# Patient Record
Sex: MALE | Race: WHITE
[De-identification: names, ages, dates, MRNs, and addresses within clinical notes are randomized per-mention and may not be internally consistent; named-entity substitution may affect disease eponyms.]

---

## 2017-11-20 ENCOUNTER — HOSPITAL ENCOUNTER (EMERGENCY)
Dept: HOSPITAL 74 - FER | Age: 52
Discharge: HOME | End: 2017-11-20
Payer: COMMERCIAL

## 2017-11-20 VITALS — HEART RATE: 70 BPM | DIASTOLIC BLOOD PRESSURE: 90 MMHG | SYSTOLIC BLOOD PRESSURE: 166 MMHG

## 2017-11-20 VITALS — TEMPERATURE: 98 F

## 2017-11-20 VITALS — BODY MASS INDEX: 30.6 KG/M2

## 2017-11-20 DIAGNOSIS — I10: ICD-10-CM

## 2017-11-20 DIAGNOSIS — Z79.82: ICD-10-CM

## 2017-11-20 DIAGNOSIS — J45.901: Primary | ICD-10-CM

## 2017-11-20 DIAGNOSIS — Z86.79: ICD-10-CM

## 2017-11-20 NOTE — PDOC
History of Present Illness





- General


History Source: Patient


Exam Limitations: No Limitations





- History of Present Illness


Initial Comments: 





11/20/17 16:01


The patient is a 52 year old male, with a significant past medical history of 

asthma(with prior hospitalizations and ICU stay without intubation) and 

hypertension, who presents to the emergency department with chest tightness for 

approximately 2 days. The patient reports waking up yesterday with chest 

tightness and shortness of breath. Patient reports using Symbicort daily 

Albuterol with no relief. Today, patient reports waking up with wheezing and a 

cough productive of white-yellow sputum. Patient reports using 6 pumps of his 

Albuterol today with relief of cough and wheezing. Patient reports tachycardia, 

which is typical after he uses his Albuterol. He denies any recent fever, chills

, headache, or dizziness. He denies any chest pain or diaphoresis. He denies 

any nausea or vomiting. 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 

recent travel. 





Allergies: NKDA


Past Surgical History: None reported.


Social History: Nonsmoker. No ETOH or recreational drug use. 








<Essence Valdez - Last Filed: 11/20/17 16:00>





- General


History Source: Patient


Exam Limitations: No Limitations





<Elvin Camilo - Last Filed: 11/20/17 16:59>





- General


Chief Complaint: Asthma


Stated Complaint: asthma


Time Seen by Provider: 11/20/17 15:45





Past History





<Essence Valdez - Last Filed: 11/20/17 16:00>





- Past Medical History


Asthma: Yes


Cardiac Disorders: Yes (TAA.  1 EPISODE ASTHMA RELATED A-FIB 5 YRS AGO)


COPD: No


HTN: Yes





- Suicide/Smoking/Psychosocial Hx


Smoking History: Never smoked


Have you smoked in the past 12 months: Yes


Number of Cigarettes Smoked Daily: 3


'Breaking Loose' booklet given: 10/13/14


Hx Alcohol Use: No


Drug/Substance Use Hx: No


Substance Use Type: None





<Elvin Camilo - Last Filed: 11/20/17 16:59>





- Past Medical History


Allergies/Adverse Reactions: 


 Allergies











Allergy/AdvReac Type Severity Reaction Status Date / Time


 


No Known Allergies Allergy   Verified 10/13/14 10:16











Home Medications: 


Ambulatory Orders





Albuterol Sulfate Inhaler - [Ventolin HFA Inhaler -] 1 - 2 inh PO QID PRN 10/13/

14 


Aspirin Coated [Ecotrin -] 325 mg PO Q48H 10/13/14 


Budesonide/Formeterol Fumarate [SYMBICORT 160/4.5mcg -] 1 inh PO DAILY 11/20/17 


Diltiazem Cd [Cardizem Cd -] 300 mg PO DAILY 11/20/17 


Prednisone [Deltasone] 60 mg PO DAILY #12 tablet 11/20/17 











**Review of Systems





- Review of Systems


Able to Perform ROS?: Yes


Comments:: 





11/20/17 16:01


GENERAL/CONSTITUTIONAL: No fever or chills. No weakness.


HEAD, EYES, EARS, NOSE AND THROAT: No change in vision. No ear pain or 

discharge. No sore throat.


CARDIOVASCULAR: Yes shortness of breath. No chest pain.


RESPIRATORY: Yes cough productive of white-yellow sputum, wheezing, and chest 

tightness, No hemoptysis.


GASTROINTESTINAL: No nausea, vomiting, diarrhea or constipation.


GENITOURINARY: No dysuria, frequency, or change in urination.


MUSCULOSKELETAL: No joint or muscle swelling or pain. No neck or back pain.


SKIN: No rash


NEUROLOGIC: No headache, vertigo, loss of consciousness, or change in strength/

sensation.


ENDOCRINE: No increased thirst. No abnormal weight change.


HEMATOLOGIC/LYMPHATIC: No anemia, easy bleeding, or history of blood clots.


ALLERGIC/IMMUNOLOGIC: No hives or skin allergy.








<Essence Valdez - Last Filed: 11/20/17 16:00>





*Physical Exam





- Vital Signs


 Last Vital Signs











Temp Pulse Resp BP Pulse Ox


 


 98 F   141 H  22   158/127   97 


 


 11/20/17 15:44  11/20/17 15:44  11/20/17 15:44  11/20/17 15:44  11/20/17 15:44














- Physical Exam


Comments: 





11/20/17 16:01





GENERAL: Awake, alert, and fully oriented, in no acute distress


HEAD: No signs of trauma


EYES: PERRLA, EOMI, sclera anicteric, conjunctiva clear


ENT: Auricles normal inspection, hearing grossly normal, nares patent, 

oropharynx clear without exudates. Moist mucosa


NECK: Normal ROM, supple, no lymphadenopathy, JVD, or masses


LUNGS: Breath sounds equal, clear to auscultation bilaterally.  No wheezes, and 

no crackles


HEART: Tachycardic. Regular rhythm, normal S1 and S2, no murmurs, rubs or 

gallops


ABDOMEN: Soft, nontender, normoactive bowel sounds.  No guarding, no rebound.  

No masses


EXTREMITIES: Normal range of motion, no edema.  No clubbing or cyanosis. No 

cords, erythema, or tenderness


NEUROLOGICAL: Cranial nerves II through XII grossly intact.  Normal speech, 

normal gait


SKIN: Warm, Dry, normal turgor, no rashes or lesions noted.








<Essence Valdez - Last Filed: 11/20/17 16:00>





- Vital Signs


 Last Vital Signs











Temp Pulse Resp BP Pulse Ox


 


 98 F   141 H  22   158/127   97 


 


 11/20/17 15:44  11/20/17 15:44  11/20/17 15:44  11/20/17 15:44  11/20/17 15:44














<Elvin Camilo - Last Filed: 11/20/17 16:59>





Medical Decision Making





- Medical Decision Making





11/20/17 15:54





A portion of this note was documented by scribe services under my direction. I 

have reviewed the details of the note, within reason, and agree with the 

documentation with the following case summary and management plan written by 

me. 





Patient treated in the ED.





Nursing notes are reviewed and incorporated into the medical decision-making.


Vital signs reviewed.





 Vital Signs











Temp Pulse Resp BP Pulse Ox


 


 98 F   141 H  22   158/127   97 


 


 11/20/17 15:44  11/20/17 15:44  11/20/17 15:44  11/20/17 15:44  11/20/17 15:44








52-year-old male with past mental history of hypertension, asthma with prior 

hospitalizations and ICU stays without intubations, currently on Symbicort, 

presents with asthma exacerbation. Patient reports that yesterday felt 

tightness and wheezing that worsened today. Reports clearish productive cough 

but denies fevers or chills. Patient had taken several pumps of his albuterol 

which states he always makes him tachycardic. Reports that his wheezing is 

cleared up but was concerned so came to the ED.





Patient lungs are clear now but with notable tachycardia likely secondary to 

albuterol. We'll give him prednisone and observe the patient. If the patient's 

symptoms are improved the heart rate improves, patient be discharged home with 

albuterol and prednisone.





Diagnosis: Asthma exacerbation


11/20/17 16:54





Patient has been observed. Breathing comfortably. HR 70.





I discussed the physical exam findings, ancillary test results and final 

diagnoses with the patient.  I answered all of the patient's questions.  The 

patient was satisfied with the care received and felt comfortable with the 

discharge plan and treatment plan.  The patient will call their primary care 

physician within 24 hours to arrange follow-up and will return to the Emergency 

Department with any new, persistant or worsening symptoms. 





<Elvin Camilo - Last Filed: 11/20/17 16:59>





*DC/Admit/Observation/Transfer





- Attestations


Scribe Attestion: 





11/20/17 16:01





Documentation prepared by Essence Valdez, acting as medical scribe for Elvin Camilo MD.





<Essence Valdez - Last Filed: 11/20/17 16:00>





- Discharge Dispostion


Admit: No





<Elvin Camilo - Last Filed: 11/20/17 16:59>


Diagnosis at time of Disposition: 


Asthma exacerbation attacks


Qualifiers:


 Asthma severity: unspecified severity Asthma persistence: unspecified 

Qualified Code(s): J45.901 - Unspecified asthma with (acute) exacerbation








- Discharge Dispostion


Disposition: HOME


Condition at time of disposition: Improved





- Prescriptions


Prescriptions: 


Prednisone [Deltasone] 60 mg PO DAILY #12 tablet





- Patient Instructions


Printed Discharge Instructions:  Asthma -- Adult


Additional Instructions: 


Take 2 puffs of albuterol every 4 hours as needed for wheezing.


Continue to take the prednisone daily.


Follow up with your doctor.

## 2018-10-04 ENCOUNTER — TRANSCRIPTION ENCOUNTER (OUTPATIENT)
Age: 53
End: 2018-10-04

## 2018-10-20 ENCOUNTER — HOSPITAL ENCOUNTER (INPATIENT)
Dept: HOSPITAL 74 - JER | Age: 53
LOS: 7 days | Discharge: HOME | DRG: 308 | End: 2018-10-27
Attending: INTERNAL MEDICINE | Admitting: INTERNAL MEDICINE
Payer: COMMERCIAL

## 2018-10-20 VITALS — BODY MASS INDEX: 29.4 KG/M2

## 2018-10-20 DIAGNOSIS — R00.0: ICD-10-CM

## 2018-10-20 DIAGNOSIS — I48.92: Primary | ICD-10-CM

## 2018-10-20 DIAGNOSIS — J18.1: ICD-10-CM

## 2018-10-20 DIAGNOSIS — E87.1: ICD-10-CM

## 2018-10-20 DIAGNOSIS — Z87.891: ICD-10-CM

## 2018-10-20 DIAGNOSIS — I10: ICD-10-CM

## 2018-10-20 DIAGNOSIS — G72.89: ICD-10-CM

## 2018-10-20 DIAGNOSIS — J45.901: ICD-10-CM

## 2018-10-20 DIAGNOSIS — I08.1: ICD-10-CM

## 2018-10-20 DIAGNOSIS — I48.0: ICD-10-CM

## 2018-10-20 DIAGNOSIS — I42.8: ICD-10-CM

## 2018-10-20 DIAGNOSIS — I70.0: ICD-10-CM

## 2018-10-20 DIAGNOSIS — E66.9: ICD-10-CM

## 2018-10-20 PROCEDURE — G0008 ADMIN INFLUENZA VIRUS VAC: HCPCS

## 2018-10-21 LAB
ALBUMIN SERPL-MCNC: 3.5 G/DL (ref 3.4–5)
ALBUMIN SERPL-MCNC: 4 G/DL (ref 3.4–5)
ALP SERPL-CCNC: 66 U/L (ref 45–117)
ALP SERPL-CCNC: 83 U/L (ref 45–117)
ALT SERPL-CCNC: 25 U/L (ref 13–61)
ALT SERPL-CCNC: 30 U/L (ref 13–61)
ANION GAP SERPL CALC-SCNC: 11 MMOL/L (ref 8–16)
ANION GAP SERPL CALC-SCNC: 12 MMOL/L (ref 8–16)
APPEARANCE UR: CLEAR
AST SERPL-CCNC: 23 U/L (ref 15–37)
AST SERPL-CCNC: 24 U/L (ref 15–37)
BACTERIA #/AREA URNS HPF: (no result) /HPF
BASOPHILS # BLD: 1.1 % (ref 0–2)
BILIRUB SERPL-MCNC: 1.1 MG/DL (ref 0.2–1)
BILIRUB SERPL-MCNC: 1.2 MG/DL (ref 0.2–1)
BILIRUB UR STRIP.AUTO-MCNC: NEGATIVE MG/DL
BNP SERPL-MCNC: 2442.2 PG/ML (ref 5–125)
BUN SERPL-MCNC: 10 MG/DL (ref 7–18)
BUN SERPL-MCNC: 14 MG/DL (ref 7–18)
CALCIUM SERPL-MCNC: 8.5 MG/DL (ref 8.5–10.1)
CALCIUM SERPL-MCNC: 9 MG/DL (ref 8.5–10.1)
CHLORIDE SERPL-SCNC: 100 MMOL/L (ref 98–107)
CHLORIDE SERPL-SCNC: 95 MMOL/L (ref 98–107)
CO2 SERPL-SCNC: 24 MMOL/L (ref 21–32)
CO2 SERPL-SCNC: 25 MMOL/L (ref 21–32)
COLOR UR: YELLOW
CREAT SERPL-MCNC: 0.7 MG/DL (ref 0.55–1.3)
CREAT SERPL-MCNC: 0.8 MG/DL (ref 0.55–1.3)
DEPRECATED RDW RBC AUTO: 12.9 % (ref 11.9–15.9)
DEPRECATED RDW RBC AUTO: 13.3 % (ref 11.9–15.9)
EOSINOPHIL # BLD: 0.1 % (ref 0–4.5)
GLUCOSE SERPL-MCNC: 113 MG/DL (ref 74–106)
GLUCOSE SERPL-MCNC: 136 MG/DL (ref 74–106)
HCT VFR BLD CALC: 43.8 % (ref 35.4–49)
HCT VFR BLD CALC: 47.6 % (ref 35.4–49)
HGB BLD-MCNC: 14.6 GM/DL (ref 11.7–16.9)
HGB BLD-MCNC: 15.7 GM/DL (ref 11.7–16.9)
KETONES UR QL STRIP: (no result)
LEUKOCYTE ESTERASE UR QL STRIP.AUTO: (no result)
LYMPHOCYTES # BLD: 7.8 % (ref 8–40)
MAGNESIUM SERPL-MCNC: 2.7 MG/DL (ref 1.8–2.4)
MCH RBC QN AUTO: 31 PG (ref 25.7–33.7)
MCH RBC QN AUTO: 31 PG (ref 25.7–33.7)
MCHC RBC AUTO-ENTMCNC: 33 G/DL (ref 32–35.9)
MCHC RBC AUTO-ENTMCNC: 33.3 G/DL (ref 32–35.9)
MCV RBC: 93 FL (ref 80–96)
MCV RBC: 93.9 FL (ref 80–96)
MONOCYTES # BLD AUTO: 14.7 % (ref 3.8–10.2)
NEUTROPHILS # BLD: 76.3 % (ref 42.8–82.8)
NITRITE UR QL STRIP: NEGATIVE
PH BLDV: 7.49 [PH] (ref 7.32–7.42)
PH UR: 6 [PH] (ref 5–8)
PHOSPHATE SERPL-MCNC: 4 MG/DL (ref 2.5–4.9)
PLATELET # BLD AUTO: 234 K/MM3 (ref 134–434)
PLATELET # BLD AUTO: 241 K/MM3 (ref 134–434)
PMV BLD: 7.7 FL (ref 7.5–11.1)
PMV BLD: 7.7 FL (ref 7.5–11.1)
POTASSIUM SERPLBLD-SCNC: 3.9 MMOL/L (ref 3.5–5.1)
POTASSIUM SERPLBLD-SCNC: 4.1 MMOL/L (ref 3.5–5.1)
PROT SERPL-MCNC: 6.9 G/DL (ref 6.4–8.2)
PROT SERPL-MCNC: 8.2 G/DL (ref 6.4–8.2)
PROT UR QL STRIP: (no result)
PROT UR QL STRIP: NEGATIVE
RBC # BLD AUTO: 4.71 M/MM3 (ref 4–5.6)
RBC # BLD AUTO: 5.07 M/MM3 (ref 4–5.6)
SODIUM SERPL-SCNC: 130 MMOL/L (ref 136–145)
SODIUM SERPL-SCNC: 136 MMOL/L (ref 136–145)
SP GR UR: 1.02 (ref 1.01–1.03)
UROBILINOGEN UR STRIP-MCNC: NEGATIVE MG/DL (ref 0.2–1)
VENOUS PC02: 34.4 MMHG (ref 38–52)
VENOUS PO2: 42.8 MMHG (ref 28–48)
WBC # BLD AUTO: 11 K/MM3 (ref 4–10)
WBC # BLD AUTO: 8 K/MM3 (ref 4–10)

## 2018-10-21 RX ADMIN — AZITHROMYCIN DIHYDRATE SCH MLS/HR: 500 INJECTION, POWDER, LYOPHILIZED, FOR SOLUTION INTRAVENOUS at 10:38

## 2018-10-21 RX ADMIN — APIXABAN SCH MG: 5 TABLET, FILM COATED ORAL at 15:57

## 2018-10-21 RX ADMIN — CEFTRIAXONE SCH MLS/HR: 1 INJECTION, POWDER, FOR SOLUTION INTRAMUSCULAR; INTRAVENOUS at 10:09

## 2018-10-21 RX ADMIN — HEPARIN SODIUM SCH UNIT: 5000 INJECTION, SOLUTION INTRAVENOUS; SUBCUTANEOUS at 10:09

## 2018-10-21 RX ADMIN — METOPROLOL TARTRATE SCH MG: 25 TABLET, FILM COATED ORAL at 14:15

## 2018-10-21 RX ADMIN — METOPROLOL TARTRATE SCH MG: 25 TABLET, FILM COATED ORAL at 22:07

## 2018-10-21 RX ADMIN — BUDESONIDE AND FORMOTEROL FUMARATE DIHYDRATE SCH: 160; 4.5 AEROSOL RESPIRATORY (INHALATION) at 10:09

## 2018-10-21 RX ADMIN — HEPARIN SODIUM SCH UNIT: 5000 INJECTION, SOLUTION INTRAVENOUS; SUBCUTANEOUS at 22:06

## 2018-10-21 RX ADMIN — LORATADINE SCH MG: 10 TABLET ORAL at 10:09

## 2018-10-21 RX ADMIN — APIXABAN SCH MG: 5 TABLET, FILM COATED ORAL at 22:07

## 2018-10-21 RX ADMIN — HEPARIN SODIUM SCH UNIT: 5000 INJECTION, SOLUTION INTRAVENOUS; SUBCUTANEOUS at 14:56

## 2018-10-21 NOTE — CON.CARD
Consult


Consult Specialty:: Cardiology


Referred by:: Hospitalist


Reason for Consultation:: Cardiac evaluation





- History of Present Illness


Chief Complaint: Shortness of breath and cough


History of Present Illness: 








Patient is 53 year old male with underlying history of PAF (not on 

anticoagulation) and bronchial asthma and history of bronchitis who presents 

with chills, shortness of breath and cough. He initially went to Urgent care 3 

weeks ago and was given Medrol pack. He returned again with persistent symptoms 

and was given Zithromax, but his symptoms did not improve. He came in yesterday 

to [place] ED and was found with acute left upper lobe infiltrate compatible 

with pneumonia on CT of chest. He was given IV antibiotics. Monitor also 

reveals atrial flutter with RVR. He denies chest pain at this time. SOB has 

improved. Denies palpitations. Denies paroxysmal nocturnal dyspnea or 

orthopnea. Denies fever. Denies nausea, vomiting, diarrhea or abdominal pain. 

Denies headache or lightheadedness.





- History Source


History Provided By: Patient, Medical Record


Limitations to Obtaining History: No Limitations





- Past Medical History


Cardio/Vascular: Yes: AFIB, HTN


Pulmonary: Yes: Asthma, Bronchitis





- Past Surgical History


Past Surgical History: Yes: None





- Alcohol/Substance Use


Hx Alcohol Use: Yes





- Smoking History


Smoking history: Former smoker


Have you smoked in the past 12 months: Yes


Aproximately how many cigarettes per day: 3





Home Medications





- Allergies


Allergies/Adverse Reactions: 


 Allergies











Allergy/AdvReac Type Severity Reaction Status Date / Time


 


No Known Allergies Allergy   Verified 10/13/14 10:16














- Home Medications


Home Medications: 


Ambulatory Orders





Albuterol Sulfate Inhaler - [Ventolin HFA Inhaler -] 1 - 2 inh PO QID PRN 10/13/

14 


Budesonide/Formeterol Fumarate [SYMBICORT 160/4.5mcg -] 1 inh PO DAILY 11/20/17 


Diltiazem Cd [Cardizem Cd -] 300 mg PO DAILY 11/20/17 


Loratadine [Claritin] 10 mg PO DAILY 10/21/18 


Valsartan 40 mg PO DAILY 10/21/18 











Family Disease History





- Family Disease History


Other Family History: History of AF





Review of Systems





- Review of Systems


Constitutional: reports: Chills.  denies: Fever


Cardiovascular: reports: Chest Pain, Palpitations, Shortness of Breath


Respiratory: reports: Cough, SOB, Wheezing.  denies: Hemoptysis, Orthopnea, PND

, SOB on Exertion


Gastrointestinal: denies: Abdominal Pain, Constipation, Diarrhea, Melena, Nausea

, Rectal Bleeding, Vomiting


Genitourinary: denies: Dysuria, Hematuria


Neurological: denies: Dizziness, Headache, Seizure, Syncope


Vital Signs: 


 Vital Signs











Temperature  97.6 F   10/21/18 13:00


 


Pulse Rate  125 H  10/21/18 13:00


 


Respiratory Rate  100 H  10/21/18 13:00


 


Blood Pressure  114/84   10/21/18 13:00


 


O2 Sat by Pulse Oximetry (%)  100   10/21/18 13:00











Eyes: Yes: PERRL


HENT: Yes: Atraumatic


Neck: Yes: Supple


Respiratory: Yes: Diminished


Gastrointestinal: Yes: Normal Bowel Sounds, Soft.  No: Tenderness


Cardiovascular: Yes: Tachycardia, Pulse Irregular


JVD: No


Carotid Bruit: No


PMI: Non-Displaced


Heart Sounds: Yes: S1, S2.  No: Gallop


Murmur: No: Systolic Murmur, Diastolic Murmur


Edema: No





- Other Data


Labs, Other Data: 


 CBC, BMP





 10/21/18 09:43 





 10/21/18 09:43 





 Troponin, BNP











  10/21/18 10/21/18





  00:09 09:43


 


Troponin I  < 0.02  < 0.02


 


B-Natriuretic Peptide  2442.2 H 








  Laboratory Results - last 24 hr











  10/20/18 10/21/18 10/21/18





  23:58 00:09 00:09


 


WBC   11.0 H 


 


RBC   4.71 


 


Hgb   14.6 


 


Hct   43.8 


 


MCV   93.0 


 


MCH   31.0 


 


MCHC   33.3 


 


RDW   12.9 


 


Plt Count   234 


 


MPV   7.7 


 


Absolute Neuts (auto)   8.4 H 


 


Neutrophils %   76.3 


 


Lymphocytes %   7.8 L 


 


Monocytes %   14.7 H 


 


Eosinophils %   0.1 


 


Basophils %   1.1 


 


Nucleated RBC %   0 


 


ESR   


 


VBG pH  7.49 H  


 


POC VBG pCO2  34.4 L  


 


POC VBG pO2  42.8  


 


Mixed VBG HCO3  25.6 H  


 


Sodium    130 L


 


Potassium    3.9


 


Chloride    95 L


 


Carbon Dioxide    25


 


Anion Gap    11


 


BUN    14


 


Creatinine    0.8


 


Creat Clearance w eGFR    > 60


 


Random Glucose    113 H


 


Serum Osmolality   


 


Calcium    8.5


 


Phosphorus   


 


Magnesium   


 


Total Bilirubin    1.1 H


 


AST    24


 


ALT    25


 


Alkaline Phosphatase    66


 


Troponin I    < 0.02


 


C-Reactive Protein   


 


B-Natriuretic Peptide    2442.2 H


 


Total Protein    6.9


 


Albumin    3.5


 


TSH    0.62


 


Urine Color   


 


Urine Appearance   


 


Urine pH   


 


Ur Specific Gravity   


 


Urine Protein   


 


Urine Glucose (UA)   


 


Urine Ketones   


 


Urine Blood   


 


Urine Nitrite   


 


Urine Bilirubin   


 


Urine Urobilinogen   


 


Ur Leukocyte Esterase   


 


Urine WBC (Auto)   


 


Urine RBC (Auto)   


 


Urine Bacteria   














  10/21/18 10/21/18 10/21/18





  00:11 02:35 09:43


 


WBC    8.0


 


RBC    5.07


 


Hgb    15.7


 


Hct    47.6


 


MCV    93.9


 


MCH    31.0


 


MCHC    33.0


 


RDW    13.3


 


Plt Count    241


 


MPV    7.7


 


Absolute Neuts (auto)   


 


Neutrophils %   


 


Lymphocytes %   


 


Monocytes %   


 


Eosinophils %   


 


Basophils %   


 


Nucleated RBC %   


 


ESR   


 


VBG pH   


 


POC VBG pCO2   


 


POC VBG pO2   


 


Mixed VBG HCO3   


 


Sodium   


 


Potassium   


 


Chloride   


 


Carbon Dioxide   


 


Anion Gap   


 


BUN   


 


Creatinine   


 


Creat Clearance w eGFR   


 


Random Glucose   


 


Serum Osmolality   


 


Calcium   


 


Phosphorus   


 


Magnesium   


 


Total Bilirubin   


 


AST   


 


ALT   


 


Alkaline Phosphatase   


 


Troponin I   


 


C-Reactive Protein   


 


B-Natriuretic Peptide   


 


Total Protein   


 


Albumin   


 


TSH   0.84  D 


 


Urine Color  Yellow  


 


Urine Appearance  Clear  


 


Urine pH  6.0  


 


Ur Specific Gravity  1.017  


 


Urine Protein  1+ H  


 


Urine Glucose (UA)  Negative  


 


Urine Ketones  1+ H  


 


Urine Blood  1+ H  


 


Urine Nitrite  Negative  


 


Urine Bilirubin  Negative  


 


Urine Urobilinogen  Negative  


 


Ur Leukocyte Esterase  Trace  


 


Urine WBC (Auto)  2  


 


Urine RBC (Auto)  3  


 


Urine Bacteria  Rare  














  10/21/18 10/21/18 10/21/18





  09:43 09:43 09:43


 


WBC   


 


RBC   


 


Hgb   


 


Hct   


 


MCV   


 


MCH   


 


MCHC   


 


RDW   


 


Plt Count   


 


MPV   


 


Absolute Neuts (auto)   


 


Neutrophils %   


 


Lymphocytes %   


 


Monocytes %   


 


Eosinophils %   


 


Basophils %   


 


Nucleated RBC %   


 


ESR    25 H


 


VBG pH   


 


POC VBG pCO2   


 


POC VBG pO2   


 


Mixed VBG HCO3   


 


Sodium  136  


 


Potassium  4.1  


 


Chloride  100  


 


Carbon Dioxide  24  


 


Anion Gap  12  


 


BUN  10  


 


Creatinine  0.7  


 


Creat Clearance w eGFR  > 60  


 


Random Glucose  136 H  


 


Serum Osmolality   


 


Calcium  9.0  


 


Phosphorus  4.0  


 


Magnesium  2.7 H  


 


Total Bilirubin  1.2 H  


 


AST  23  


 


ALT  30  


 


Alkaline Phosphatase  83  


 


Troponin I   < 0.02 


 


C-Reactive Protein  16.2 H  


 


B-Natriuretic Peptide   


 


Total Protein  8.2  


 


Albumin  4.0  


 


TSH   


 


Urine Color   


 


Urine Appearance   


 


Urine pH   


 


Ur Specific Gravity   


 


Urine Protein   


 


Urine Glucose (UA)   


 


Urine Ketones   


 


Urine Blood   


 


Urine Nitrite   


 


Urine Bilirubin   


 


Urine Urobilinogen   


 


Ur Leukocyte Esterase   


 


Urine WBC (Auto)   


 


Urine RBC (Auto)   


 


Urine Bacteria   




















Atrial flutter with RVR





Imaging





- Results


Chest X-ray: Report Reviewed (Left upper lobe pneumonia)


Cat Scan: Report Reviewed (CT of chest left upper lobe infiltrates c/w acute 

pneumonia)


EKG: Report Reviewed





Problem List





- Problems


(1) Bronchial asthma


Code(s): J45.909 - UNSPECIFIED ASTHMA, UNCOMPLICATED   


Qualifiers: 


   Asthma severity: unspecified severity   Asthma persistence: unspecified   

Asthma complication type: unspecified   Qualified Code(s): J45.909 - 

Unspecified asthma, uncomplicated   





(2) HTN (hypertension)


Code(s): I10 - ESSENTIAL (PRIMARY) HYPERTENSION   


Qualifiers: 


   Hypertension type: essential hypertension   Qualified Code(s): I10 - 

Essential (primary) hypertension   





(3) Atrial fibrillation


Code(s): I48.91 - UNSPECIFIED ATRIAL FIBRILLATION   


Qualifiers: 


   Atrial fibrillation type: paroxysmal   Qualified Code(s): I48.0 - Paroxysmal 

atrial fibrillation   





(4) Atrial flutter


Code(s): I48.92 - UNSPECIFIED ATRIAL FLUTTER   


Qualifiers: 


   Atrial flutter type: typical   Qualified Code(s): I48.3 - Typical atrial 

flutter   





(5) Pneumonia


Code(s): J18.9 - PNEUMONIA, UNSPECIFIED ORGANISM   


Qualifiers: 


   Pneumonia type: due to unspecified organism   Laterality: left   Lung 

location: upper lobe of lung   Qualified Code(s): J18.1 - Lobar pneumonia, 

unspecified organism   





Assessment/Plan





1. Clinical presentation c/w acute left upper lobe pneumonia


2. Atrial flutter with RVR with prior history of PAF not on anticoagulation 

[id number] score likely 1


3. HTN


4. Bronchial asthma with history of bronchitis





PLAN:


1. D/C Metoprolol and continue with Cardizem  mg QD. Give Cardizem 10 mg 

IVP


2. Add Eliquis 5 mg BID. If patient continues to be in atrial flutter, may 

consider ERNIE +/- DC synchronized cardioversion


3. Echocardiography to assess LV/RV and valvular function


4. Antibiotic coverage and pulmonary input to follow. Bronchodilators and O2 as 

needed


5. D/C Furosemide as he does not appear to be in heart failure





Further plans are to follow


Sam Ricardo MD

## 2018-10-21 NOTE — HP
CHIEF COMPLAINT: Shortness of breath





PCP:





HISTORY OF PRESENT ILLNESS:


Patient is a 53 year old male with history of asthma, Afib (no on 

anticoagulation), hypertension, presents with complaint of shortness of breath 

for the past three weeks. States that symptoms occurred with cough that has 

become productive with clear to yellow sputum. Denies subjective fevers, 

however admits chills, and night sweats. Endorses intermittent nausea, without 

vomiting. Admits sick contacts as he works as  in [facility]. 

Further admits new onset of two pillow orthopnea over the past three weeks. He 

went to urgent care center on two occasions, and received Medrol dose pack on 

first visit, and azithromycin on second visit which were not palliative. States 

his last cardiologist appointment was more than one year ago. 





ER course was notable for:


(1) EKG showed Aflutter 2:1 AV conduction.


(2) Vancomycin, Zosyn, Ofirmev, Duonebs, Prednisone 


(3)





Recent Travel:





PAST MEDICAL HISTORY: asthma, Afib (no on anticoagulation), hypertension





PAST SURGICAL HISTORY: 





Social History:


Smoking:Former smoker, Quit 3 years ago


Alcohol: Admits approx 6 beers, socially three times a week


Drugs: Denies





Family History:


Allergies





No Known Allergies Allergy (Verified 10/13/14 10:16)


 








HOME MEDICATIONS:


 Home Medications











 Medication  Instructions  Recorded


 


Albuterol Sulfate Inhaler - 1 - 2 inh PO QID PRN 10/13/14





[Ventolin HFA Inhaler -]  


 


Budesonide/Formeterol Fumarate 1 inh PO DAILY 11/20/17





[SYMBICORT 160/4.5mcg -]  


 


Diltiazem Cd [Cardizem Cd -] 300 mg PO DAILY 11/20/17


 


Loratadine [Claritin] 10 mg PO DAILY 10/21/18


 


Valsartan 40 mg PO DAILY 10/21/18








REVIEW OF SYSTEMS


As per HPI








PHYSICAL EXAMINATION


 Vital Signs - 24 hr











  10/20/18 10/21/18





  23:20 03:41


 


Temperature 100.3 F H 


 


Pulse Rate 135 H 


 


Respiratory 26 H 





Rate  


 


Blood Pressure 115/83 


 


O2 Sat by Pulse 100 96





Oximetry (%)  











GENERAL: Awake, alert, and fully oriented, in no acute distress.


HEAD: Normal with no signs of trauma.


EYES: Pupils equal, round and reactive to light, extraocular movements intact, 

sclera anicteric, conjunctiva clear. No lid lag.


EARS, NOSE, THROAT: Ears normal, nares patent, oropharynx clear without 

exudates. Moist mucous membranes.


NECK: Normal range of motion, supple without lymphadenopathy, JVD, or masses.


LUNGS: Breath sounds equal, clear to auscultation bilaterally. No wheezes, and 

no crackles. No accessory muscle use.


HEART: Regular rate and rhythm, normal S1 and S2 without murmur, rub or gallop.


ABDOMEN: Soft, nontender, not distended, normoactive bowel sounds, no guarding, 

no rebound, no masses.  No hepatomegaly or  splenomegaly. 


MUSCULOSKELETAL: Normal range of motion at all joints. No bony deformities or 

tenderness. No CVA tenderness.


UPPER EXTREMITIES: 2+ radial pulses b/l, warm, well-perfused. No cyanosis noted.


LOWER EXTREMITIES: 2+ dorsalis pulses b/l, warm, well-perfused. No calf 

tenderness b/l. 1+ lower extremity pitting edema b/l. 


NEUROLOGICAL:  Cranial nerves II-XII intact. Normal speech. Normal gait.


PSYCHIATRIC: Cooperative. Good eye contact. Appropriate mood and affect.


SKIN: Warm, dry, normal turgor, no rashes or lesions noted, normal capillary 

refill. 





 Laboratory Results - last 24 hr











  10/20/18 10/21/18 10/21/18





  23:58 00:09 00:09


 


WBC   11.0 H 


 


RBC   4.71 


 


Hgb   14.6 


 


Hct   43.8 


 


MCV   93.0 


 


MCH   31.0 


 


MCHC   33.3 


 


RDW   12.9 


 


Plt Count   234 


 


MPV   7.7 


 


Absolute Neuts (auto)   8.4 H 


 


Neutrophils %   76.3 


 


Lymphocytes %   7.8 L 


 


Monocytes %   14.7 H 


 


Eosinophils %   0.1 


 


Basophils %   1.1 


 


Nucleated RBC %   0 


 


VBG pH  7.49 H  


 


POC VBG pCO2  34.4 L  


 


POC VBG pO2  42.8  


 


Mixed VBG HCO3  25.6 H  


 


Sodium    130 L


 


Potassium    3.9


 


Chloride    95 L


 


Carbon Dioxide    25


 


Anion Gap    11


 


BUN    14


 


Creatinine    0.8


 


Creat Clearance w eGFR    > 60


 


Random Glucose    113 H


 


Calcium    8.5


 


Total Bilirubin    1.1 H


 


AST    24


 


ALT    25


 


Alkaline Phosphatase    66


 


Troponin I    < 0.02


 


B-Natriuretic Peptide    2442.2 H


 


Total Protein    6.9


 


Albumin    3.5


 


TSH    0.62


 


Urine Color   


 


Urine Appearance   


 


Urine pH   


 


Ur Specific Gravity   


 


Urine Protein   


 


Urine Glucose (UA)   


 


Urine Ketones   


 


Urine Blood   


 


Urine Nitrite   


 


Urine Bilirubin   


 


Urine Urobilinogen   


 


Ur Leukocyte Esterase   


 


Urine WBC (Auto)   


 


Urine RBC (Auto)   


 


Urine Bacteria   














  10/21/18 10/21/18





  00:11 02:35


 


WBC  


 


RBC  


 


Hgb  


 


Hct  


 


MCV  


 


MCH  


 


MCHC  


 


RDW  


 


Plt Count  


 


MPV  


 


Absolute Neuts (auto)  


 


Neutrophils %  


 


Lymphocytes %  


 


Monocytes %  


 


Eosinophils %  


 


Basophils %  


 


Nucleated RBC %  


 


VBG pH  


 


POC VBG pCO2  


 


POC VBG pO2  


 


Mixed VBG HCO3  


 


Sodium  


 


Potassium  


 


Chloride  


 


Carbon Dioxide  


 


Anion Gap  


 


BUN  


 


Creatinine  


 


Creat Clearance w eGFR  


 


Random Glucose  


 


Calcium  


 


Total Bilirubin  


 


AST  


 


ALT  


 


Alkaline Phosphatase  


 


Troponin I  


 


B-Natriuretic Peptide  


 


Total Protein  


 


Albumin  


 


TSH   0.84  D


 


Urine Color  Yellow 


 


Urine Appearance  Clear 


 


Urine pH  6.0 


 


Ur Specific Gravity  1.017 


 


Urine Protein  1+ H 


 


Urine Glucose (UA)  Negative 


 


Urine Ketones  1+ H 


 


Urine Blood  1+ H 


 


Urine Nitrite  Negative 


 


Urine Bilirubin  Negative 


 


Urine Urobilinogen  Negative 


 


Ur Leukocyte Esterase  Trace 


 


Urine WBC (Auto)  2 


 


Urine RBC (Auto)  3 


 


Urine Bacteria  Rare 











ASSESSMENT/PLAN:


Patient is a 53 year old male with history of asthma, Afib (no on 

anticoagulation), hypertension, presents with complaint of shortness of breath 

for the past three weeks. 





Shortness of breath


-May be secondary to heart failure vs possible community acquired pneumonia


-EKG showed atrial flutter with 2:1 AV conduction. HR 133bpm. 


-Troponin negative at 0.02. Trend troponins


-BNP 2442.2 


-F/U cardiac ECHO


-F/U cardiology consult (Sam Wood)


-Daily weights


-Intake and output


-Lasix 40 IV Q12H


-Metoprolol 25 mg PO daily


-Lisinopril 5.0 mg PO daily





Questionable pneumonia


-Azithromycin 500mg IV daily


-Ceftriaxone 1 gram IV daily


-F/U urine for pneumonia, legionella


-F/U blood cultures


-F/U sputum cultures





Hyponatremia


-F/U urine electrolytes


-F/U serum and urine osmolality


-F/U urine creatinine





Afib


-Patient states he is not on coumadin anymore, and only takes aspirin for 

anticoagulation.


-Continue cardizem 300mg PO daily


-Continue Aspirin 325mg PO daily


-F/U cardiology consult





FEN


-No IV fluids.


-Hyponatremia. Follow CMP


-Sodium controlled diet





Prophylaxis


-Heparin [id number] TID





Disposition


-Admit to telemetry





Visit type





- Emergency Visit


Emergency Visit: Yes


ED Registration Date: 10/21/18


Care time: The patient presented to the Emergency Department on the above date 

and was hospitalized for further evaluation of their emergent condition.





- New Patient


This patient is new to me today: Yes


Date on this admission: 10/21/18





- Critical Care


Critical Care patient: No

## 2018-10-21 NOTE — PDOC
Attending Attestation





- Resident


Resident Name: Axel Zimmer





- ED Attending Attestation


I have performed the following: I have examined & evaluated the patient, The 

case was reviewed & discussed with the resident, I agree w/resident's findings 

& plan, Exceptions are as noted





- HPI


HPI: 





10/21/18 01:42


The patient is a 53 year old male with past medical history of atrial 

fibrillation, hypertension, and asthma (requiring ICU stay, no intubations) who 

presents with 3 weeks of worsening shortness of breath both at rest and with 

exertion, cough, and fever. His symptoms have not been relieved by courses of 

prednisone or z-pack which was prescribed by urgent care on two separate 

occasions. Patient states he also is unable to lie flat at night and was 

experiencing lower extremity swelling which was treated after taking his HCTZ. 

Patient denies any weakness, chest pain, palpitations, nausea, vomiting, 

diarrhea, or urinary complaints. 





- Physicial Exam


PE: 





10/21/18 01:42


agree with resident exam





- Medical Decision Making





10/21/18 01:42


54yo M hx asthma presents to the ED with progressive PETERSEN, chest discomfort not 

releived by nebs, steroids, azithromycin. HR elevated to 130s, but pt getting 

nebs at the time of vitals check. Work up remarkable for elevated BNP. Story 

concerning for new onset CHF. CXR pending, will admit for cardiac w/u. 








**Heart Score/ECG Review


  ** #1





10/21/18 01:44


Twelve-lead EKG was performed and reviewed by me. Sinus tachycardia, rate 133. 

Normal axis. Baseline is unclear, no evidence of ST elevations.

## 2018-10-21 NOTE — PN
Progress Note (short form)





- Note


Progress Note: 





PULMONARY





CONSULTATION DICTATED 10/21/18








IMP DYSPNEA


      MELISSA PNEUMONIA


      AFLUTTER


      ASTHMA


      HTN








PLAN IV ABX


        CULTURES


        INHALED BRONCHODILATORS


        O2


        RATE CONTROL AS PER CARDIOLOGY


        AC


        LEGIONELLA URINARY ANTIGEN


        COLD AGGLUTININS


        SPUTUM C+S


        F/U CHEST X-RAYS


         F/U CHEST CT 6 WKS TO CONFIRM RESOLUTION ON INFILTRATES








Problem List





- Problems


(1) A-fib


Code(s): I48.91 - UNSPECIFIED ATRIAL FIBRILLATION   





(2) Atrial fibrillation


Code(s): I48.91 - UNSPECIFIED ATRIAL FIBRILLATION   


Qualifiers: 


   Atrial fibrillation type: paroxysmal   Qualified Code(s): I48.0 - Paroxysmal 

atrial fibrillation   





(3) Atrial flutter


Code(s): I48.92 - UNSPECIFIED ATRIAL FLUTTER   


Qualifiers: 


   Atrial flutter type: typical   Qualified Code(s): I48.3 - Typical atrial 

flutter   





(4) HTN (hypertension)


Code(s): I10 - ESSENTIAL (PRIMARY) HYPERTENSION   


Qualifiers: 


   Hypertension type: essential hypertension   Qualified Code(s): I10 - 

Essential (primary) hypertension   





(5) Pneumonia


Code(s): J18.9 - PNEUMONIA, UNSPECIFIED ORGANISM   


Qualifiers: 


   Pneumonia type: due to unspecified organism   Laterality: left   Lung 

location: upper lobe of lung   Qualified Code(s): J18.1 - Lobar pneumonia, 

unspecified organism   





(6) Asthma


Code(s): J45.909 - UNSPECIFIED ASTHMA, UNCOMPLICATED

## 2018-10-21 NOTE — EKG
Test Reason : 

Blood Pressure : ***/*** mmHG

Vent. Rate : 133 BPM     Atrial Rate : 266 BPM

   P-R Int : 000 ms          QRS Dur : 084 ms

    QT Int : 270 ms       P-R-T Axes : -88 -29 089 degrees

   QTc Int : 401 ms

 

SINUS TACHYCARDIA

INFERIOR INFARCT , AGE UNDETERMINED

ANTERIOR INFARCT (CITED ON OR BEFORE 21-OCT-2018)

ABNORMAL ECG

 

Confirmed by KITTY JOHN MD ([id number]) on 10/21/2018 11:07:21 AM

 

Referred By:             Confirmed By:KITTY JOHN MD

## 2018-10-21 NOTE — PN
Teaching Attending Note


Name of Resident: Daniel Perea





ATTENDING PHYSICIAN STATEMENT





I saw and evaluated the patient.


Chart, data, imaging reviewed. 


I reviewed the resident's note and discussed the case with the resident.


I agree with the resident's findings and plan as documented.








SUBJECTIVE:


53 year old male with history of asthma, Afib (no on anticoagulation), 

hypertension, presents with progressive shortness of breath for the past three 

weeks. Endorses a productive yellow-sputum cough. States that he steady put on 

weight over last 2 years. Reports + orthopnea, decreased exercise tolerance. 

Denied overt chest pain. 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 

in a hospital. 





OBJECTIVE:


 Last Vital Signs











Temp Pulse Resp BP Pulse Ox


 


 100.3 F H  135 H  26 H  115/83   96 


 


 10/20/18 23:20  10/20/18 23:20  10/20/18 23:20  10/20/18 23:20  10/21/18 03:41








General -nontoxic, nad 


heent-at, nc 


neck -no jvd noted


cv-s1+s2+tachycardia, regular, s3 gallop+ 


chest -clear to auscultation 


abdomen-obese, soft, nt 


ext-1+ pedal edema b/l 











 Abnormal Lab Results











  10/20/18 10/21/18 10/21/18





  23:58 00:09 00:09


 


WBC   11.0 H 


 


Absolute Neuts (auto)   8.4 H 


 


Lymphocytes %   7.8 L 


 


Monocytes %   14.7 H 


 


VBG pH  7.49 H  


 


POC VBG pCO2  34.4 L  


 


Mixed VBG HCO3  25.6 H  


 


Sodium    130 L


 


Chloride    95 L


 


Random Glucose    113 H


 


Total Bilirubin    1.1 H


 


B-Natriuretic Peptide    2442.2 H


 


Urine Protein   


 


Urine Ketones   


 


Urine Blood   














  10/21/18





  00:11


 


WBC 


 


Absolute Neuts (auto) 


 


Lymphocytes % 


 


Monocytes % 


 


VBG pH 


 


POC VBG pCO2 


 


Mixed VBG HCO3 


 


Sodium 


 


Chloride 


 


Random Glucose 


 


Total Bilirubin 


 


B-Natriuretic Peptide 


 


Urine Protein  1+ H


 


Urine Ketones  1+ H


 


Urine Blood  1+ H








CXR reviewed- b/l pulmonary vascular congestion noted, sharp costophrenic 

angles 


ekg- a- flutter 2:1 





ASSESSMENT AND PLAN:


#Likely new onset of CHF given high BNP, +orthopnea, pedal edema, pulm vascular 

congestion. Possibly also underlying community acquired pneumonia as productive 

cough is endorsed, cannot r/o infiltrates on CXR. + aflutter with hx of afib, 

electively not on anticoagulation. 


-admit to telemetry 


-cardiology consult 


-transthoracic echo 


-i/o 


-daily weights 


-furosemide 40mg IV q12hrs 


-bblocker 


-ACEi 


-trend troponin 


-salt restriction 


-free water restriction 





#Possible CAP


-blood cultures x2 


-sputum culture 


-urine legionella ag 


-ceftriaxone 


-azithromycin 





#hyponatremia- likely SIADH possibly from CAP or hypervolemic hyponatremia in 

setting of CHF 


-urine lytes, cr


-serum osm 


-urine osm 


-tsh 





heparin sc for DVT ppx

## 2018-10-21 NOTE — PDOC
History of Present Illness





- General


Chief Complaint: Asthma


Stated Complaint: ASTHMA


Time Seen by Provider: 10/20/18 23:35


History Source: Patient


Exam Limitations: No Limitations





- History of Present Illness


Initial Comments: 





53 year old male, with a significant past medical history of AFIB, asthma(with 

prior hospitalizations and ICU stay without intubation) and hypertension is 

here with Shortness of breath, difficulty breathing, cough and a recent fever. 

He states that for the past 3 weeks he has experienced extreme SOB with exertion

, the inability to lie flat at night, and feels like he is choking when he lies 

down. He states he needs to keep his head elevated in order to sleep properly. 


He went to urgent care twice and they originally gave him methylprednisolone, 

that didn't work, then he went back and they gave him azithromycin. He is 

almost done with his Abx course. He endorses significant chest pain with 

exertion. He is not currently experiencing any chest pain. 





He has also has a productive cough with Left sided chest pain for the past 3 

days. 





Patient does not have a primary care provider - He needs a referall. 





He also describes these hot and cold symptoms where everyone else in the room 

is cold but he is sweating. Occasionally he breaks out in sweats, experiences 

heat and cold intolerance. 





PCP: None


Allergies: NKA, NKDA


Social Hx: Significant alcohol/beer consumption on weekends. Denies cigarettes 

or illicit drug usage. 














Past History





- Past Medical History


Allergies/Adverse Reactions: 


 Allergies











Allergy/AdvReac Type Severity Reaction Status Date / Time


 


No Known Allergies Allergy   Verified 10/13/14 10:16











Home Medications: 


Ambulatory Orders





Albuterol Sulfate Inhaler - [Ventolin HFA Inhaler -] 1 - 2 inh PO QID PRN 10/13/

14 


Budesonide/Formeterol Fumarate [SYMBICORT 160/4.5mcg -] 1 inh PO DAILY 11/20/17 


Diltiazem Cd [Cardizem Cd -] 300 mg PO DAILY 11/20/17 


Loratadine [Claritin] 10 mg PO DAILY 10/21/18 


Valsartan 40 mg PO DAILY 10/21/18 








Asthma: Yes


Cardiac Disorders: Yes (TAA.  1 EPISODE ASTHMA RELATED A-FIB 5 YRS AGO)


COPD: No


HTN: Yes





- Suicide/Smoking/Psychosocial Hx


Smoking History: Never smoked


Have you smoked in the past 12 months: Yes


Number of Cigarettes Smoked Daily: 3


'Breaking Loose' booklet given: 10/13/14


Hx Alcohol Use: No


Drug/Substance Use Hx: No


Substance Use Type: None





**Review of Systems





- Review of Systems


Comments:: 





CONSTITUTIONAL: 


Present: Fever, chills, diaphoresis


Absent: generalized weakness, malaise, loss of appetite


HEENT: 


Absent: rhinorrhea, nasal congestion, throat pain, throat swelling, difficulty 

swallowing, mouth swelling, ear pain, eye pain, visual Changes


CARDIOVASCULAR: 


Present: Chest pain, peripheral edema, lightheadedness


Absent: syncope, palpitations, irregular heart rate


RESPIRATORY: 


Present: Cough, shortness of breath, dyspnea with exertion, orthopnea 


Absent: wheezing, stridor, hemoptysis


GASTROINTESTINAL:


Present: Nausea


Absent: abdominal pain, abdominal distension, vomiting, diarrhea, constipation, 

melena, hematochezia


GENITOURINARY: 


Absent: dysuria, frequency, urgency, hesitancy, hematuria, flank pain, genital 

pain


MUSCULOSKELETAL: 


Absent: myalgia, arthralgia, joint swelling


SKIN: 


Absent: rash, itching, pallor


HEMATOLOGIC/IMMUNOLOGIC: 


Absent: easy bleeding, easy bruising, lymphadenopathy, frequent infections


ENDOCRINE:


Absent: unexplained weight gain, unexplained weight loss, heat intolerance, 

cold intolerance


NEUROLOGIC: 


Present: Dizziness


Absent: headache, focal weakness or paresthesias, unsteady gait, seizure, 

mental status changes, bladder or bowel incontinence


PSYCHIATRIC: 


Absent: anxiety, depression, suicidal or homicidal ideation, hallucinations.














*Physical Exam





- Vital Signs


 Last Vital Signs











Temp Pulse Resp BP Pulse Ox


 


 100.3 F H  135 H  26 H  115/83   100 


 


 10/20/18 23:20  10/20/18 23:20  10/20/18 23:20  10/20/18 23:20  10/20/18 23:20














- Physical Exam


Comments: 





GENERAL:


Well developed, well nourished. Awake and alert. No acute distress.


HEENT:


Normocephalic, atraumatic. PERRLA, EOMI. No conjunctival pallor. Sclera are non-

icteric. Moist mucous membranes. Oropharynx is clear.


NECK: 


Supple. Full ROM. No JVD. Carotid pulses 2+ and symmetric, without bruits. No 

thyromegaly. No lymphadenopathy.


CARDIOVASCULAR:


Tachycardic rate and regular rhythm. No murmurs, rubs, or gallops. Distal 

pulses are 2+ and symmetric. 


PULMONARY: 


Clear evidence of respiratory distress. 


However, Lungs are clear to auscultation bilaterally. No wheezing, rales or 

rhonchi.


ABDOMINAL:


Soft. Non-tender. Non-distended. No rebound or guarding. No organomegaly. 

Normoactive bowel sounds. 


MUSCULOSKELETAL 


Normal range of motion at all joints. No bony deformities or tenderness. No CVA 

tenderness.


EXTREMITIES: 


No cyanosis. No clubbing. No edema. No calf tenderness.


SKIN: 


Warm and dry. Normal capillary refill. No rashes. No jaundice. 


NEUROLOGICAL: 


Alert, awake, appropriate. Cranial nerves 2-12 intact. No deficits to light 

touch in face, upper extremities and lower extremities. No motor deficits in 

the in face, upper extremities and lower extremities. Normal speech. Gait is 

normal without ataxia.


PSYCHIATRIC: 


Cooperative. Good eye contact. Appropriate mood and affect.











ED Treatment Course





- LABORATORY


CBC & Chemistry Diagram: 


 10/21/18 00:09





 10/21/18 00:09





- ADDITIONAL ORDERS


Additional order review: 


 Laboratory  Results











  10/20/18





  23:58


 


VBG pH  7.49 H


 


POC VBG pCO2  34.4 L


 


POC VBG pO2  42.8


 


Mixed VBG HCO3  25.6 H














- RADIOLOGY


Radiology Studies Ordered: 














 Category Date Time Status


 


 CHEST PA & LAT [RAD] Stat Radiology  10/20/18 23:47 Ordered














Medical Decision Making





- Medical Decision Making





53 year old male, with a significant past medical history of AFIB, asthma(with 

prior hospitalizations and ICU stay without intubation) and hypertension is 

here with Shortness of breath, difficulty breathing, cough and a recent fever.


Patient is febrile here in the ED. 





DD includes but not limited to: CHF, Asthma exacerbation, PNA, AFIB, ACS, URI, 

influenza. 





Plan: Cbc, Cmp, Trop, Bnp, Tsh, Ekg, influenza, CXR, re-assess.





Cbc shows elevated WBC of 11.


Cmp shows hyponatremia of 130


BNP elevated to 2442


Trop negative. 





Given hot and cold symptoms, hx of AFIB, will send TSH to evaluate for thyroid 

abnormalities. 





CXR shows a Left sided Pneumonia. He has been taking azithromycin. This is 

failed outpatient treatment. 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 

associated pneumonia with Vanc and Zosyn. 





Patient meets SIRS. Also has a source of infection - lungs PNA. He is septic. 





Given clinical history, hx of AFIB and hypertension, elevated BNP this patient 

is likely experiencing new onset heart failure. 





Patient will be admitted for heart failure, PNA, AFIB, Sepsis. 


Abx started here in ED. 





Given CXR read of upper lobe PNA we will put the patient in isolation. 





*DC/Admit/Observation/Transfer


Diagnosis at time of Disposition: 


 Pneumonia, A-fib, Heart failure, Asthma attack








- Discharge Dispostion


Condition at time of disposition: Guarded


Decision to Admit order: Yes





- Referrals





- Patient Instructions





- Post Discharge Activity

## 2018-10-21 NOTE — CONS
DATE OF CONSULTATION:  10/21/2018

 

REFERRING PHYSICIAN:  Sam Ricardo MD

 

The patient is a 53-year-old white male with a past medical history of chronic

asthma, maintained on Symbicort and albuterol, paroxysmal atrial fibrillation,

maintained on aspirin, hypertension, nonsmoker admitted to [facility] with a complaint of a 3-week history of increasing shortness of breath and

nonproductive cough.  Patient states he started feeling the above symptoms.  He went

to an urgent care center and, at that time, was prescribed a Medrol Dosepak. 

Initially, he felt some improvement, but his symptoms recurred.  He went back to the

urgent care center and, at this time, was prescribed Zithromax, which has not offered

significant improvement, at which time he presented to the emergency room.  Of note,

the patient states that he did have subjective fevers and some chills and night

sweats over the past couple of weeks.  The urgent care center, apparently, did not

send any blood work and/or obtain a chest x-ray.  The patient has some nausea without

vomiting.  Denies hemoptysis.  Denies any chest pain or palpitations.  Denies any

weight loss.  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  Denies any

history of recent travel.  There is no history of DVT or PE in the past.

 

PAST MEDICAL HISTORY:  Again, includes asthma, atrial fibrillation, and hypertension.

 

REVIEW OF SYSTEMS:  Positive cough, positive mild sputum, positive chills, positive

subjective fever.  No chest pain, no palpitations.  Positive nausea.  No vomiting.

 

SOCIAL HISTORY:  History of smoking; quit a few years ago.  No occupational

exposures.

 

CURRENT MEDICATIONS:  Symbicort 160/4.5; Diovan; Zithromax; ceftriaxone; heparin;

Ventolin; Toprol; Cardizem; Lasix; aspiring; and Claritin.

 

PHYSICAL EXAMINATION:

General:  The patient is a well-developed, well-nourished male, awake, alert, in no

acute distress.

Vital Signs:  He is currently afebrile.  Heart rate is 125, blood pressure is 114/84,

respiratory rate is 18, and O2 saturation is 100%. 

HEENT:  Normocephalic, atraumatic.

Neck:  Supple.

Heart:  Tachycardic, S1 and S2.

Chest:  Clear.

Abdomen:  Soft.  Bowel sounds are positive.

Extremities:  No cyanosis, edema.

 

WBCs 8, hemoglobin 15.7, hematocrit 47.6 with a platelet count of 241,000.  Venous

blood gas pH 7.49, pCO2 of 34, pO2 of 42, bicarbonate of 26.  BUN is 10, creatinine

0.7.  BNP is 2442.  Bilirubin is 1.2.  Magnesium 2.7.

 

Chest CT reveals a large left upper lobe consolidation.

 

IMPRESSION:  

1.  Left upper lobe consolidation, consistent with pneumonia.

2.  Atrial fibrillation/flutter.

3.  Hypertension.

4.  Chronic asthma.

 

PLAN:  Broad-spectrum antibiotics; inhaled bronchodilators; rate control, as per

Cardiology; obtain followup chest x-ray as well as followup chest CT in approximately

6 weeks to document resolution of consolidation; monitor peak flow; send serum _____

and legionella urinary antigen.

 

 

BARRETT ROWLAND M.D.

 

[id number]

DD: 10/21/2018 13:33

DT: 10/21/2018 14:07

Job #:  [id number]

## 2018-10-22 LAB
ALBUMIN SERPL-MCNC: 3.2 G/DL (ref 3.4–5)
ALP SERPL-CCNC: 58 U/L (ref 45–117)
ALT SERPL-CCNC: 27 U/L (ref 13–61)
ANION GAP SERPL CALC-SCNC: 11 MMOL/L (ref 8–16)
AST SERPL-CCNC: 19 U/L (ref 15–37)
BASOPHILS # BLD: 1.1 % (ref 0–2)
BILIRUB SERPL-MCNC: 0.6 MG/DL (ref 0.2–1)
BUN SERPL-MCNC: 16 MG/DL (ref 7–18)
CALCIUM SERPL-MCNC: 8.1 MG/DL (ref 8.5–10.1)
CHLORIDE SERPL-SCNC: 103 MMOL/L (ref 98–107)
CO2 SERPL-SCNC: 24 MMOL/L (ref 21–32)
CREAT SERPL-MCNC: 0.7 MG/DL (ref 0.55–1.3)
DEPRECATED RDW RBC AUTO: 13.1 % (ref 11.9–15.9)
EOSINOPHIL # BLD: 1.3 % (ref 0–4.5)
GLUCOSE SERPL-MCNC: 103 MG/DL (ref 74–106)
HCT VFR BLD CALC: 43.2 % (ref 35.4–49)
HGB BLD-MCNC: 14.4 GM/DL (ref 11.7–16.9)
INR BLD: 1.43 (ref 0.83–1.09)
LYMPHOCYTES # BLD: 18.7 % (ref 8–40)
MAGNESIUM SERPL-MCNC: 2.3 MG/DL (ref 1.8–2.4)
MCH RBC QN AUTO: 30.9 PG (ref 25.7–33.7)
MCHC RBC AUTO-ENTMCNC: 33.3 G/DL (ref 32–35.9)
MCV RBC: 92.9 FL (ref 80–96)
MONOCYTES # BLD AUTO: 11.6 % (ref 3.8–10.2)
NEUTROPHILS # BLD: 67.3 % (ref 42.8–82.8)
PHOSPHATE SERPL-MCNC: 3.2 MG/DL (ref 2.5–4.9)
PLATELET # BLD AUTO: 234 K/MM3 (ref 134–434)
PMV BLD: 7.2 FL (ref 7.5–11.1)
POTASSIUM SERPLBLD-SCNC: 3.9 MMOL/L (ref 3.5–5.1)
PROT SERPL-MCNC: 6.4 G/DL (ref 6.4–8.2)
PT PNL PPP: 16.9 SEC (ref 9.7–13)
RBC # BLD AUTO: 4.65 M/MM3 (ref 4–5.6)
SODIUM SERPL-SCNC: 138 MMOL/L (ref 136–145)
WBC # BLD AUTO: 9.5 K/MM3 (ref 4–10)

## 2018-10-22 RX ADMIN — PREDNISONE SCH MG: 20 TABLET ORAL at 14:04

## 2018-10-22 RX ADMIN — AZITHROMYCIN DIHYDRATE SCH MLS/HR: 500 INJECTION, POWDER, LYOPHILIZED, FOR SOLUTION INTRAVENOUS at 09:32

## 2018-10-22 RX ADMIN — APIXABAN SCH MG: 5 TABLET, FILM COATED ORAL at 21:25

## 2018-10-22 RX ADMIN — LORATADINE SCH MG: 10 TABLET ORAL at 09:30

## 2018-10-22 RX ADMIN — CEFTRIAXONE SCH MLS/HR: 1 INJECTION, POWDER, FOR SOLUTION INTRAMUSCULAR; INTRAVENOUS at 09:31

## 2018-10-22 RX ADMIN — HEPARIN SODIUM SCH UNIT: 5000 INJECTION, SOLUTION INTRAVENOUS; SUBCUTANEOUS at 06:11

## 2018-10-22 RX ADMIN — BUDESONIDE AND FORMOTEROL FUMARATE DIHYDRATE SCH PUFF: 160; 4.5 AEROSOL RESPIRATORY (INHALATION) at 12:57

## 2018-10-22 RX ADMIN — METOPROLOL TARTRATE SCH MG: 25 TABLET, FILM COATED ORAL at 09:32

## 2018-10-22 RX ADMIN — APIXABAN SCH MG: 5 TABLET, FILM COATED ORAL at 09:32

## 2018-10-22 NOTE — PN
Progress Note, Physician


History of Present Illness: 


Remains in rapid aflutter despite efforts at rate-control, reports dyspnea, 

light-headedness, palpitations and chest tightness.








- Current Medication List


Current Medications: 


Active Medications





Albuterol Sulfate (Ventolin Hfa Inhaler -)  2 puff IH Q6H PRN


   PRN Reason: ASTHMA


   Last Admin: 10/21/18 17:04 Dose:  2 inh


Apixaban (Eliquis -)  5 mg PO BID [facility]


   Last Admin: 10/22/18 09:32 Dose:  5 mg


Budesonide/Formoterol Fumarate (Symbicort 160/4.5mcg -)  1 puff IH DAILY [facility]


   Last Admin: 10/21/18 10:09 Dose:  Not Given


Diltiazem HCl (Cardizem Cd -)  300 mg PO DAILY [facility]


   Last Admin: 10/22/18 09:30 Dose:  300 mg


Heparin Sodium (Porcine) (Heparin -)  5,000 unit SQ TID [facility]


   Last Admin: 10/22/18 06:11 Dose:  5,000 unit


Azithromycin 500 mg/ Dextrose  250 mls @ 250 mls/hr IVPB DAILY [facility]


   Last Admin: 10/22/18 09:32 Dose:  250 mls/hr


Ceftriaxone Sodium 1 gm/ (Dextrose)  50 mls @ 100 mls/hr IVPB DAILY [facility]


   Last Admin: 10/22/18 09:31 Dose:  100 mls/hr


Loratadine (Claritin -)  10 mg PO DAILY [facility]


   Last Admin: 10/22/18 09:30 Dose:  10 mg


Metoprolol Tartrate (Lopressor -)  25 mg PO BID [facility]


   Last Admin: 10/22/18 09:32 Dose:  25 mg


Valsartan (Diovan -)  40 mg PO DAILY [facility]


   Last Admin: 10/21/18 10:09 Dose:  40 mg











- Objective


Vital Signs: 


 Vital Signs











Temperature  98.1 F   10/22/18 11:19


 


Pulse Rate  126 H  10/22/18 11:19


 


Respiratory Rate  17   10/22/18 11:19


 


Blood Pressure  107/70   10/22/18 11:19


 


O2 Sat by Pulse Oximetry (%)  98   10/22/18 09:52











Constitutional: Yes: No Distress, Calm


Neck: Yes: Supple


Cardiovascular: Yes: Regular Rate and Rhythm, Tachycardia


Respiratory: Yes: Regular, CTA Bilaterally


Gastrointestinal: Yes: Normal Bowel Sounds, Soft


Edema: No


Labs: 


 CBC, BMP





 10/22/18 06:00 





 10/22/18 06:00 





 INR, PTT











INR  1.43  (0.83-1.09)  H  10/22/18  06:00    














- ....Imaging


EKG: Report Reviewed (Tele: Rapid aflutter)





Problem List





- Problems


(1) Asthma


Code(s): J45.909 - UNSPECIFIED ASTHMA, UNCOMPLICATED   


Qualifiers: 


   Asthma severity: mild   Asthma persistence: intermittent 





(2) Atrial flutter


Code(s): I48.92 - UNSPECIFIED ATRIAL FLUTTER   


Qualifiers: 


   Atrial flutter type: typical   Qualified Code(s): I48.3 - Typical atrial 

flutter   





(3) HTN (hypertension)


Code(s): I10 - ESSENTIAL (PRIMARY) HYPERTENSION   


Qualifiers: 


   Hypertension type: essential hypertension   Qualified Code(s): I10 - 

Essential (primary) hypertension   





(4) Pneumonia


Code(s): J18.9 - PNEUMONIA, UNSPECIFIED ORGANISM   


Qualifiers: 


   Pneumonia type: due to unspecified organism   Laterality: left   Lung 

location: upper lobe of lung   Qualified Code(s): J18.1 - Lobar pneumonia, 

unspecified organism   





Assessment/Plan


1. Dyspnea with acute left upper lobe pneumonia


2. Atrial flutter with RVR with prior history of PAF not on anticoagulation 

[id number] score likely 1


3. HTN


4. Bronchial asthma with history of bronchitis





PLAN:


1. Change Lopressor 25 bid to sotalol 80 bid with check QTc after LVEF 

assessment, Diovan 40qd and start Cardizem gtt for improved rate-control


2. Continue Eliquis 5 mg BID, d/c subq heparin. If patient continues to be in 

atrial flutter, may consider ERNIE +/- DC synchronized cardioversion, hold NPO 

after MN in case


3. F/u echocardiography to assess LV/RV and valvular function


4. Empiric antibiotic coverage, Bronchodilators and O2 as needed

## 2018-10-22 NOTE — PN
Progress Note (short form)





- Note


Progress Note: 


Congested cough.  No hemoptysis. 


Right neck/back pain form stretcher. 


SOB a little better. 





 Intake & Output











 10/19/18 10/20/18 10/21/18 10/22/18





 23:59 23:59 23:59 23:59


 


Weight  245 lb  241 lb 14.4 oz








 Last Vital Signs











Temp Pulse Resp BP Pulse Ox


 


 97.9 F   126 H  17   107/70   97 


 


 10/22/18 12:48  10/22/18 12:48  10/22/18 12:48  10/22/18 12:48  10/22/18 12:33








Active Medications





Albuterol Sulfate (Ventolin Hfa Inhaler -)  2 puff IH Q6H PRN


   PRN Reason: ASTHMA


   Last Admin: 10/21/18 17:04 Dose:  2 inh


Apixaban (Eliquis -)  5 mg PO BID [facility]


   Last Admin: 10/22/18 09:32 Dose:  5 mg


Budesonide/Formoterol Fumarate (Symbicort 160/4.5mcg -)  1 puff IH DAILY [facility]


   Last Admin: 10/21/18 10:09 Dose:  Not Given


Ceftriaxone Sodium 1 gm/ (Dextrose)  50 mls @ 100 mls/hr IVPB DAILY [facility]


   Last Admin: 10/22/18 09:31 Dose:  100 mls/hr


Diltiazem HCl 125 mg/ Sodium (Chloride)  125 mls @ 5 mls/hr IVPB TITR [facility]; 

Protocol


Influenza Virus Vaccine Quadrival (Flulaval Quad [id number])  60 mcg IM .ONCE ONE


   Stop: 10/24/18 10:01


Loratadine (Claritin -)  10 mg PO DAILY [facility]


   Last Admin: 10/22/18 09:30 Dose:  10 mg


Prednisone (Deltasone -)  60 mg PO DAILY [facility]


Sotalol HCl (Betapace -)  80 mg PO BID [facility]


   Last Admin: 10/22/18 12:57 Dose:  80 mg


Valsartan (Diovan -)  40 mg PO DAILY [facility]


   Last Admin: 10/21/18 10:09 Dose:  40 mg














Constitutional: Yes: No Distress


Neck: Yes: Supple


Cardiovascular: Yes: Regular Rate and Rhythm, Tachycardia


Respiratory: Yes: few scattered rhonchi on the left 


Gastrointestinal: Yes: Normal Bowel Sounds, Soft


Edema: No


Labs: 


  Laboratory Results - last 24 hr











  10/21/18 10/21/18 10/21/18





  18:50 18:50 18:50


 


WBC   


 


RBC   


 


Hgb   


 


Hct   


 


MCV   


 


MCH   


 


MCHC   


 


RDW   


 


Plt Count   


 


MPV   


 


Absolute Neuts (auto)   


 


Neutrophils %   


 


Lymphocytes %   


 


Monocytes %   


 


Eosinophils %   


 


Basophils %   


 


Nucleated RBC %   


 


PT with INR   


 


INR   


 


Sodium   


 


Potassium   


 


Chloride   


 


Carbon Dioxide   


 


Anion Gap   


 


BUN   


 


Creatinine   


 


Creat Clearance w eGFR   


 


Random Glucose   


 


Calcium   


 


Phosphorus   


 


Magnesium   


 


Total Bilirubin   


 


AST   


 


ALT   


 


Alkaline Phosphatase   


 


Total Protein   


 


Albumin   


 


Urine Osmolality   582 


 


Ur Random Sodium  < 18 L  


 


Ur Random Potassium  44.0  


 


Ur Random Chloride  < 11 L  


 


Urine Creatinine    158.0 H


 


HIV 1&2 Antibody Screen   


 


HIV P24 Antigen   














  10/22/18 10/22/18 10/22/18





  06:00 06:00 06:00


 


WBC  9.5  


 


RBC  4.65  


 


Hgb  14.4  


 


Hct  43.2  


 


MCV  92.9  


 


MCH  30.9  


 


MCHC  33.3  


 


RDW  13.1  


 


Plt Count  234  


 


MPV  7.2 L  


 


Absolute Neuts (auto)  6.4  


 


Neutrophils %  67.3  


 


Lymphocytes %  18.7  D  


 


Monocytes %  11.6 H  


 


Eosinophils %  1.3  D  


 


Basophils %  1.1  


 


Nucleated RBC %  0  


 


PT with INR   16.90 H 


 


INR   1.43 H 


 


Sodium    138


 


Potassium    3.9


 


Chloride    103


 


Carbon Dioxide    24


 


Anion Gap    11


 


BUN    16


 


Creatinine    0.7


 


Creat Clearance w eGFR    > 60


 


Random Glucose    103


 


Calcium    8.1 L


 


Phosphorus    3.2


 


Magnesium    2.3


 


Total Bilirubin    0.6


 


AST    19


 


ALT    27


 


Alkaline Phosphatase    58


 


Total Protein    6.4


 


Albumin    3.2 L


 


Urine Osmolality   


 


Ur Random Sodium   


 


Ur Random Potassium   


 


Ur Random Chloride   


 


Urine Creatinine   


 


HIV 1&2 Antibody Screen   


 


HIV P24 Antigen   














  10/22/18





  06:00


 


WBC 


 


RBC 


 


Hgb 


 


Hct 


 


MCV 


 


MCH 


 


MCHC 


 


RDW 


 


Plt Count 


 


MPV 


 


Absolute Neuts (auto) 


 


Neutrophils % 


 


Lymphocytes % 


 


Monocytes % 


 


Eosinophils % 


 


Basophils % 


 


Nucleated RBC % 


 


PT with INR 


 


INR 


 


Sodium 


 


Potassium 


 


Chloride 


 


Carbon Dioxide 


 


Anion Gap 


 


BUN 


 


Creatinine 


 


Creat Clearance w eGFR 


 


Random Glucose 


 


Calcium 


 


Phosphorus 


 


Magnesium 


 


Total Bilirubin 


 


AST 


 


ALT 


 


Alkaline Phosphatase 


 


Total Protein 


 


Albumin 


 


Urine Osmolality 


 


Ur Random Sodium 


 


Ur Random Potassium 


 


Ur Random Chloride 


 


Urine Creatinine 


 


HIV 1&2 Antibody Screen  Preliminary positive


 


HIV P24 Antigen  Negative














 Problem List 





- Problems


(1) A-fib


Code(s): I48.91 - UNSPECIFIED ATRIAL FIBRILLATION   





(2) Atrial fibrillation


Code(s): I48.91 - UNSPECIFIED ATRIAL FIBRILLATION   


Qualifiers: 


   Atrial fibrillation type: paroxysmal   Qualified Code(s): I48.0 - Paroxysmal 

atrial fibrillation   





(3) Atrial flutter


Code(s): I48.92 - UNSPECIFIED ATRIAL FLUTTER   


Qualifiers: 


   Atrial flutter type: typical   Qualified Code(s): I48.3 - Typical atrial 

flutter   





(4) HTN (hypertension)


Code(s): I10 - ESSENTIAL (PRIMARY) HYPERTENSION   


Qualifiers: 


   Hypertension type: essential hypertension   Qualified Code(s): I10 - 

Essential (primary) hypertension   





(5) Pneumonia


Code(s): J18.9 - PNEUMONIA, UNSPECIFIED ORGANISM   


Qualifiers: 


   Pneumonia type: due to unspecified organism   Laterality: left   Lung 

location: upper lobe of lung   Qualified Code(s): J18.1 - Lobar pneumonia, 

unspecified organism   





(6) Asthma


Code(s): J45.909 - UNSPECIFIED ASTHMA, UNCOMPLICATED   








IMP DYSPNEA


      MELISSA PNEUMONIA


      AFLUTTER


      ASTHMA


      HTN








PLAN  IV ABX PER ID 


        CULTURES


        INHALED BRONCHODILATORS


        O2


        RATE CONTROL AS PER CARDIOLOGY


        AC


        LEGIONELLA URINARY ANTIGEN


        COLD AGGLUTININS


        SPUTUM C+S


        F/U CHEST X-RAYS


        F/U CHEST CT 6 WKS TO CONFIRM RESOLUTION ON INFILTRATES





DR JIMENES

## 2018-10-22 NOTE — ECHO
______________________________________________________________________________



Name: ALFREDITO REVELES                                     Exam:Adult Echocardiogram

MRN: [id number]         Study Date: 10/22/2018 01:12 PM

Age: 53 yrs

______________________________________________________________________________



Reason For Study: HEART FAILURE

Height: 76 in        Weight: 245 lb        BSA: 2.4 m2            BP: 103/67 mmHg



______________________________________________________________________________



MMode/2D Measurements & Calculations

IVSd: 1.1 cm                                           Ao root diam: 3.8 cm

LVIDd: 6.0 cm                                          LA dimension: 5.6 cm

LVIDs: 4.9 cm

LVPWd: 1.2 cm



________________________________________________________

EDV(Teich): 182.6 ml                                   LVOT diam: 2.2 cm

ESV(Teich): 110.8 ml



________________________________________________________

TAPSE: 1.4 cm

RV S Juan: 7.0 cm/sec



Doppler Measurements & Calculations

MR max juan: 345.4 cm/sec                               TR max juan: 236.2 cm/sec

MR max P.7 mmHg                                   TR max P.3 mmHg





______________________________________________________________________________

Procedure

A complete two-dimensional transthoracic echocardiogram was performed (2D, M-mode, Doppler and color 
flow

Doppler).

Left Ventricle

The left ventricle is mildly dilated. Left ventricular systolic function is severely reduced. Ejectio
n

Fraction = 30-35%. There is severe global hypokinesis of the left ventricle.

Right Ventricle

The right ventricle is not well visualized. Reduced RV systolic function with RV systolic TDI of 7 cm
/s.

Atria

The left atrium is moderately dilated. The right atrium is moderately dilated.

Mitral Valve

The mitral valve is normal in structure and function. There is mild mitral regurgitation.

Tricuspid Valve

The tricuspid valve is normal in structure and function. There is mild to moderate tricuspid regurgit
ation.

Pulmonary artery systolic pressure is at least 37 mmHg assuming RA pressure of 15 mmHg (dilated IVC a
nd <50%

collapse).

Aortic Valve

The aortic valve is normal in structure and function. No aortic regurgitation is present.

Pulmonic Valve

The pulmonic valve is not well visualized.

Great Vessels

The aortic root is normal size.

Pericardium/Pleura

There is no pericardial effusion.



______________________________________________________________________________



Interpretation Summary

The left ventricle is mildly dilated.

Left ventricular systolic function is severely reduced.

There is severe global hypokinesis of the left ventricle.

Ejection Fraction = 30-35%.

Reduced RV systolic function with RV systolic TDI of 7 cm/s

The left atrium is moderately dilated.

The right atrium is moderately dilated.

There is mild mitral regurgitation.

There is mild to moderate tricuspid regurgitation.

Pulmonary artery systolic pressure is at least 37 mmHg assuming RA pressure of 15 mmHg (dilated IVC a
nd <50%

collapse)

There is no pericardial effusion.



Previous study is not available for comparison







Sam Ricardo MD 10/22/2018 02:30 PM

## 2018-10-22 NOTE — PN
Physical Exam: 


SUBJECTIVE: Patient seen and examined this morning at bedside. Continues to 

have cough productive of yellow phlegm. 





Denies fevers, chills, chest pain, palpitations SOB, nausea, vomiting, diarrhea

, constipation, abdominal pain, dysuria.








OBJECTIVE:





 Vital Signs











 Period  Temp  Pulse  Resp  BP Sys/Tobias  Pulse Ox


 


 Last 24 Hr  97.6 F-98.2 F    16-18  107-125/70-94  97-99











GENERAL: A&Ox3, NAD


HEAD: NCAT


EYES: PERRL, EOMI


EARS, NOSE, THROAT: Oropharynx clear without exudates. Moist mucous membranes.


NECK: No JVD


LUNGS: Diminished breath sounds throughout. No wheezes.


HEART: Regular rate and rhythm, normal S1 and S2 without murmur


ABDOMEN: Soft, nontender, not distended, + bowel sounds, no guarding


EXTREMITIES: 2+ pulses, No edema.


NEUROLOGICAL:  Cranial nerves II-XII intact. Normal speech. C5-T1 and L4-S1 

gross sensation intact. 5/5 Muscle strength to Handgrip, elbow flexion/extension

, Hip flexion, Dorsiflexion, plantarflexion.


SKIN: Warm, dry, no rashes or lesions noted








 Laboratory Results - last 24 hr











  10/21/18 10/21/18 10/21/18





  18:50 18:50 18:50


 


WBC   


 


RBC   


 


Hgb   


 


Hct   


 


MCV   


 


MCH   


 


MCHC   


 


RDW   


 


Plt Count   


 


MPV   


 


Absolute Neuts (auto)   


 


Neutrophils %   


 


Lymphocytes %   


 


Monocytes %   


 


Eosinophils %   


 


Basophils %   


 


Nucleated RBC %   


 


PT with INR   


 


INR   


 


Sodium   


 


Potassium   


 


Chloride   


 


Carbon Dioxide   


 


Anion Gap   


 


BUN   


 


Creatinine   


 


Creat Clearance w eGFR   


 


Random Glucose   


 


Calcium   


 


Phosphorus   


 


Magnesium   


 


Total Bilirubin   


 


AST   


 


ALT   


 


Alkaline Phosphatase   


 


Total Protein   


 


Albumin   


 


Urine Osmolality   582 


 


Ur Random Sodium  < 18 L  


 


Ur Random Potassium  44.0  


 


Ur Random Chloride  < 11 L  


 


Urine Creatinine    158.0 H


 


HIV 1&2 Antibody Screen   


 


HIV P24 Antigen   














  10/22/18 10/22/18 10/22/18





  06:00 06:00 06:00


 


WBC  9.5  


 


RBC  4.65  


 


Hgb  14.4  


 


Hct  43.2  


 


MCV  92.9  


 


MCH  30.9  


 


MCHC  33.3  


 


RDW  13.1  


 


Plt Count  234  


 


MPV  7.2 L  


 


Absolute Neuts (auto)  6.4  


 


Neutrophils %  67.3  


 


Lymphocytes %  18.7  D  


 


Monocytes %  11.6 H  


 


Eosinophils %  1.3  D  


 


Basophils %  1.1  


 


Nucleated RBC %  0  


 


PT with INR   16.90 H 


 


INR   1.43 H 


 


Sodium    138


 


Potassium    3.9


 


Chloride    103


 


Carbon Dioxide    24


 


Anion Gap    11


 


BUN    16


 


Creatinine    0.7


 


Creat Clearance w eGFR    > 60


 


Random Glucose    103


 


Calcium    8.1 L


 


Phosphorus    3.2


 


Magnesium    2.3


 


Total Bilirubin    0.6


 


AST    19


 


ALT    27


 


Alkaline Phosphatase    58


 


Total Protein    6.4


 


Albumin    3.2 L


 


Urine Osmolality   


 


Ur Random Sodium   


 


Ur Random Potassium   


 


Ur Random Chloride   


 


Urine Creatinine   


 


HIV 1&2 Antibody Screen   


 


HIV P24 Antigen   














  10/22/18





  06:00


 


WBC 


 


RBC 


 


Hgb 


 


Hct 


 


MCV 


 


MCH 


 


MCHC 


 


RDW 


 


Plt Count 


 


MPV 


 


Absolute Neuts (auto) 


 


Neutrophils % 


 


Lymphocytes % 


 


Monocytes % 


 


Eosinophils % 


 


Basophils % 


 


Nucleated RBC % 


 


PT with INR 


 


INR 


 


Sodium 


 


Potassium 


 


Chloride 


 


Carbon Dioxide 


 


Anion Gap 


 


BUN 


 


Creatinine 


 


Creat Clearance w eGFR 


 


Random Glucose 


 


Calcium 


 


Phosphorus 


 


Magnesium 


 


Total Bilirubin 


 


AST 


 


ALT 


 


Alkaline Phosphatase 


 


Total Protein 


 


Albumin 


 


Urine Osmolality 


 


Ur Random Sodium 


 


Ur Random Potassium 


 


Ur Random Chloride 


 


Urine Creatinine 


 


HIV 1&2 Antibody Screen  Negative


 


HIV P24 Antigen  Negative





 


 


 Microbiology





10/21/18 18:32   Sputum - Expectorated   Gram Stain - Final


10/21/18 18:50   Urine - Urine Clean Catch   Legionella Antigen - Final


10/21/18 18:50   Urine - Urine Clean Catch   Streptococcus pneumoniae Antigen (

M - Final


10/21/18 07:14   Blood - Peripheral Venous   Blood Culture - Preliminary


                            NO GROWTH OBTAINED AFTER 24 HOURS, INCUBATION TO 

CONTINUE


                            FOR 4 DAYS.


10/21/18 07:14   Blood - Peripheral Venous   Blood Culture - Preliminary


                            NO GROWTH OBTAINED AFTER 24 HOURS, INCUBATION TO 

CONTINUE


                            FOR 4 DAYS.


10/21/18 00:11   Urine - Urine Clean Catch   Urine Culture - Final


10/21/18 00:09   Nasopharyngeal Swab   Influenza Types A,B Antigen - Final


10/21/18 00:09   Nasopharyngeal Swab    - Final








Active Medications





Albuterol Sulfate (Ventolin Hfa Inhaler -)  2 puff IH Q6H PRN


   PRN Reason: ASTHMA


   Last Admin: 10/21/18 17:04 Dose:  2 inh


Apixaban (Eliquis -)  5 mg PO BID [facility]


   Last Admin: 10/22/18 09:32 Dose:  5 mg


Budesonide/Formoterol Fumarate (Symbicort 160/4.5mcg -)  1 puff IH DAILY [facility]


   Last Admin: 10/21/18 10:09 Dose:  Not Given


Carvedilol (Coreg -)  6.25 mg PO BID [facility]


Ceftriaxone Sodium 2 gm/ (Dextrose)  100 mls @ 100 mls/hr IVPB DAILY [facility]; 

Protocol


Influenza Virus Vaccine Quadrival (Flulaval Quad [id number])  60 mcg IM .ONCE ONE


   Stop: 10/24/18 10:01


Loratadine (Claritin -)  10 mg PO DAILY [facility]


   Last Admin: 10/22/18 09:30 Dose:  10 mg


Morphine Sulfate (Morphine Sulfate)  2 mg IVPUSH Q4H PRN


   PRN Reason: PAIN LEVEL 6-10


   Last Admin: 10/22/18 16:10 Dose:  2 mg


Morphine Sulfate (Morphine Sulfate)  1 mg IVPUSH Q4H PRN


   PRN Reason: PAIN LEVEL 1-5


   Last Admin: 10/22/18 14:41 Dose:  1 mg


Prednisone (Deltasone -)  60 mg PO DAILY [facility]


   Last Admin: 10/22/18 14:04 Dose:  60 mg


Valsartan (Diovan -)  40 mg PO DAILY [facility]


   Last Admin: 10/21/18 10:09 Dose:  40 mg








IMAGING:


-EKG: SINUS TACHYCARDIA, INFERIOR INFARCT , AGE UNDETERMINED, ANTERIOR INFARCT (

CITED ON OR BEFORE 21-OCT-2018),  bpm, QTc 401


-ECHO: LV is mildly dilated, LV SF is severely reduced, Global hypokinesis of 

the LV, EF 30-35%, Reduced RV Systolic function, LA and RA are moderately 

dilated, Mild MR, Mild to Mod TR


-CXR: Left upper lobe presumed pneumonia. If findings do not resolve then 

further imaging with CT is suggested. 


-CT Chest without contrast: Extensive left upper lobe consolidation consistent 

with an acute pneumonia. Clinical correlation and follow-up recommended.  








ASSESSMENT/PLAN:


52 y/o M with PMHx of asthma, Afib (not on anticoagulation), HTN, presents with 

SOB for the past three weeks. 





1. AFib with RVR


-Troponin < 0.02 x2


-BNP 2442.2 


-EKG: Sinus tachy


-ECHO: LV SF is severely reduced, Global hypokinesis of the LV, EF 30-35%


-Cardiology (Dr. Ricardo) consulted, appreciate rec's, Carvedilol 6.25 bid, Diovan 

40qd, Continue Eliquis 5 mg BID, d/c subq heparin. If patient continues to be 

in atrial flutter, may consider ERNIE +/- DC synchronized cardioversion, hold NPO 

after MN in case


-Strict I&Os, Daily weights


-Continue Carvedilol, Diovan


-Continue Eliquis 5 mg BID





2. MELISSA pneumonia


-Isoltation


-Ceftriaxone 2 gram IV daily (Started on 10/21)


-Continue Ventolin, Symbicort, Prednisone


-Completed Azithromycin course (10/21-10/22)


-Urine for pneumonia, legionella negative


-Flu swab negative


-Blood cultures NGTD


-Sputum cultures pending


-HIV Screen noted


-ID (Dr. Shen) consulted, appreciate rec's, would isolate, await 

quantiferon gold, sputum afb time 3/AFB NAAT, rocephin to continue, d/c 

zithromax


-Pulmonology (Dr. Benavidez) consulted, appreciate rec's, COLD AGGLUTININS, F/

U CHEST X-RAYS, F/U CHEST CT 6 WKS TO CONFIRM RESOLUTION ON INFILTRATES





3. Hyponatremia


-Resolved


-Urine electrolytes, serum and urine osmolality, urine creatinine noted





4. FEN


-PO fluids


-Continue to monitor for Hyponatremia


-Sodium controlled diet





5. PPx


-DVT: Eliquis





Dispo: telemetry-Inpatient




















Visit type





- Emergency Visit


Emergency Visit: Yes


ED Registration Date: 10/21/18


Care time: The patient presented to the Emergency Department on the above date 

and was hospitalized for further evaluation of their emergent condition.





- New Patient


This patient is new to me today: Yes


Date on this admission: 10/22/18





- Critical Care


Critical Care patient: No





- Discharge Referral


Referred to [facility] No

## 2018-10-22 NOTE — CON.ID
Consult


Consult Specialty:: infectious disease


Referred by:: hospitalist


Reason for Consultation:: pneumonia





- History of Present Illness


Chief Complaint: sob for 3 weeks.  fevers.  cough


History of Present Illness: 





3 week history of cough, no hemoptysis, yellow phlegnm one week chills, sweats, 

feverish


seen in urgicenter twice


steroids the first time, zpak the second time





history asthma for last 12 years


never had pneumonia





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


history of afib as well





no weight loss


denies HIV risk factors


lives with mother


no vomiting, no chest pain





prelim HIV test is positive-


no history of blood transfusion





no travel history


has lived only in [place]





- History Source


History Provided By: Patient, Medical Record





- Past Medical History


Cardio/Vascular: Yes: AFIB, HTN


Pulmonary: Yes: Asthma, Bronchitis





- Past Surgical History


Past Surgical History: Yes: Laminectomy (cervical)





- Alcohol/Substance Use


Hx Alcohol Use: No





- Smoking History


Smoking history: Former smoker


Have you smoked in the past 12 months: Yes


Aproximately how many cigarettes per day: 3





- Social History


ADL: Independent


Occupation: [employer]


Place of Birth: [place]


History of Recent Travel: No





Home Medications





- Allergies


Allergies/Adverse Reactions: 


 Allergies











Allergy/AdvReac Type Severity Reaction Status Date / Time


 


No Known Allergies Allergy   Verified 10/13/14 10:16














- Home Medications


Home Medications: 


Ambulatory Orders





Albuterol Sulfate Inhaler - [Ventolin HFA Inhaler -] 1 - 2 inh PO QID PRN 10/13/

14 


Budesonide/Formeterol Fumarate [SYMBICORT 160/4.5mcg -] 1 inh PO DAILY 11/20/17 


Diltiazem Cd [Cardizem Cd -] 300 mg PO DAILY 11/20/17 


Loratadine [Claritin] 10 mg PO DAILY 10/21/18 


Valsartan 40 mg PO DAILY 10/21/18 











Family Disease History





- Family Disease History


Other Family History: History of AF





Review of Systems





- Review of Systems


Constitutional: reports: Chills, Fever


Eyes: reports: No Symptoms


HENT: reports: No Symptoms


Neck: reports: No Symptoms


Cardiovascular: reports: No Symptoms.  denies: Chest Pain, Edema


Respiratory: reports: Cough


Gastrointestinal: reports: No Symptoms.  denies: Abdominal Pain


Genitourinary: reports: No Symptoms


Musculoskeletal: reports: No Symptoms





Physical Exam


Vital Signs: 


 Vital Signs











Temperature  97.9 F   10/22/18 12:48


 


Pulse Rate  126 H  10/22/18 12:48


 


Respiratory Rate  17   10/22/18 12:48


 


Blood Pressure  107/70   10/22/18 12:48


 


O2 Sat by Pulse Oximetry (%)  97   10/22/18 12:33











Constitutional: Yes: Well Nourished, No Distress, Anxious


Eyes: Yes: Conjunctiva Clear


HENT: Yes: Atraumatic, Normocephalic


Neck: Yes: Supple, Trachea Midline


Cardiovascular: Yes: Regular Rate and Rhythm


Respiratory: Yes: Regular, Diminished (MELISSA)


Gastrointestinal: Yes: Normal Bowel Sounds, Soft.  No: Tenderness, Tenderness, 

Rebound


...Rectal Exam: Yes: Deferred


Renal/: No: CVA Tenderness - Left, CVA Tenderness - Right


Musculoskeletal: Yes: WNL


Extremities: Yes: WNL


Edema: No


Peripheral Pulses WNL: Yes


Integumentary: Yes: WNL


Psychiatric: Yes: Alert, Oriented


Labs: 


 CBC, BMP





 10/22/18 06:00 





 10/22/18 06:00 





 Microbiology





10/21/18 18:50   Urine - Urine Clean Catch   Legionella Antigen - Final


10/21/18 18:50   Urine - Urine Clean Catch   Streptococcus pneumoniae Antigen (

M - Final


10/21/18 07:14   Blood - Peripheral Venous   Blood Culture - Preliminary


                            NO GROWTH OBTAINED AFTER 24 HOURS, INCUBATION TO 

CONTINUE


                            FOR 4 DAYS.


10/21/18 07:14   Blood - Peripheral Venous   Blood Culture - Preliminary


                            NO GROWTH OBTAINED AFTER 24 HOURS, INCUBATION TO 

CONTINUE


                            FOR 4 DAYS.


10/21/18 00:11   Urine - Urine Clean Catch   Urine Culture - Final


10/21/18 00:09   Nasopharyngeal Swab   Influenza Types A,B Antigen - Final


10/21/18 00:09   Nasopharyngeal Swab    - Final











Imaging





- Results


Chest X-ray: Report Reviewed, Image Reviewed


Cat Scan: Report Reviewed, Image Reviewed





Problem List





- Problems


(1) Pneumonia


Code(s): J18.9 - PNEUMONIA, UNSPECIFIED ORGANISM   


Qualifiers: 


   Pneumonia type: due to unspecified organism   Laterality: left   Lung 

location: upper lobe of lung   Qualified Code(s): J18.1 - Lobar pneumonia, 

unspecified organism   





(2) A-fib


Code(s): I48.91 - UNSPECIFIED ATRIAL FIBRILLATION   





Assessment/Plan





MELISSA pneumonia- r/o TB, r/o CAP


prelim hiv antibiody test positive- confirmation pending- WILL WAIT FOR 

CONFIRMATION BEFORE SPEAKING WITH PATIENT


would isolate


await quantiferon gold


sputum afb time 3/AFB NAAT





rocephin to continue 


d/c zithromax





management afib per cardiology

## 2018-10-23 ENCOUNTER — TRANSCRIPTION ENCOUNTER (OUTPATIENT)
Age: 53
End: 2018-10-23

## 2018-10-23 LAB
ALBUMIN SERPL-MCNC: 3.1 G/DL (ref 3.4–5)
ALP SERPL-CCNC: 58 U/L (ref 45–117)
ALT SERPL-CCNC: 29 U/L (ref 13–61)
ANION GAP SERPL CALC-SCNC: 9 MMOL/L (ref 8–16)
AST SERPL-CCNC: 16 U/L (ref 15–37)
BASOPHILS # BLD: 0.3 % (ref 0–2)
BILIRUB SERPL-MCNC: 0.5 MG/DL (ref 0.2–1)
BUN SERPL-MCNC: 13 MG/DL (ref 7–18)
CALCIUM SERPL-MCNC: 8.2 MG/DL (ref 8.5–10.1)
CHLORIDE SERPL-SCNC: 102 MMOL/L (ref 98–107)
CO2 SERPL-SCNC: 29 MMOL/L (ref 21–32)
CREAT SERPL-MCNC: 0.7 MG/DL (ref 0.55–1.3)
DEPRECATED RDW RBC AUTO: 13.3 % (ref 11.9–15.9)
EOSINOPHIL # BLD: 0.1 % (ref 0–4.5)
GLUCOSE SERPL-MCNC: 98 MG/DL (ref 74–106)
HCT VFR BLD CALC: 43.1 % (ref 35.4–49)
HGB BLD-MCNC: 14 GM/DL (ref 11.7–16.9)
LYMPHOCYTES # BLD: 12.2 % (ref 8–40)
MAGNESIUM SERPL-MCNC: 2.5 MG/DL (ref 1.8–2.4)
MCH RBC QN AUTO: 30.3 PG (ref 25.7–33.7)
MCHC RBC AUTO-ENTMCNC: 32.4 G/DL (ref 32–35.9)
MCV RBC: 93.5 FL (ref 80–96)
MONOCYTES # BLD AUTO: 12.2 % (ref 3.8–10.2)
NEUTROPHILS # BLD: 75.2 % (ref 42.8–82.8)
PHOSPHATE SERPL-MCNC: 3.6 MG/DL (ref 2.5–4.9)
PLATELET # BLD AUTO: 259 K/MM3 (ref 134–434)
PMV BLD: 7.8 FL (ref 7.5–11.1)
POTASSIUM SERPLBLD-SCNC: 4.4 MMOL/L (ref 3.5–5.1)
PROT SERPL-MCNC: 6.2 G/DL (ref 6.4–8.2)
RBC # BLD AUTO: 4.61 M/MM3 (ref 4–5.6)
SODIUM SERPL-SCNC: 140 MMOL/L (ref 136–145)
WBC # BLD AUTO: 9.1 K/MM3 (ref 4–10)

## 2018-10-23 RX ADMIN — LORATADINE SCH MG: 10 TABLET ORAL at 09:55

## 2018-10-23 RX ADMIN — BUDESONIDE AND FORMOTEROL FUMARATE DIHYDRATE SCH PUFF: 160; 4.5 AEROSOL RESPIRATORY (INHALATION) at 09:56

## 2018-10-23 RX ADMIN — CARVEDILOL SCH MG: 12.5 TABLET, FILM COATED ORAL at 09:55

## 2018-10-23 RX ADMIN — CARVEDILOL SCH MG: 12.5 TABLET, FILM COATED ORAL at 21:00

## 2018-10-23 RX ADMIN — APIXABAN SCH MG: 5 TABLET, FILM COATED ORAL at 09:55

## 2018-10-23 RX ADMIN — SACUBITRIL AND VALSARTAN SCH TAB: 24; 26 TABLET, FILM COATED ORAL at 21:00

## 2018-10-23 RX ADMIN — CEFTRIAXONE SODIUM SCH MLS/HR: 2 INJECTION, POWDER, FOR SOLUTION INTRAMUSCULAR; INTRAVENOUS at 09:56

## 2018-10-23 RX ADMIN — APIXABAN SCH MG: 5 TABLET, FILM COATED ORAL at 21:00

## 2018-10-23 RX ADMIN — SACUBITRIL AND VALSARTAN SCH TAB: 24; 26 TABLET, FILM COATED ORAL at 09:56

## 2018-10-23 RX ADMIN — PREDNISONE SCH MG: 20 TABLET ORAL at 09:55

## 2018-10-23 NOTE — PN
Progress Note (short form)





- Note


Progress Note: 


Chief Complaint: Events noted, notes reviewed, denies any chest pain, reports 

dyspnea this morning with wheeze, atrial flutter persists with 2:1 conduction, 

rapid ventricular response





History of Present Illness: 


Seen and examined on telemetry. Events noted, notes reviewed, denies any chest 

pain, reports dyspnea this morning with wheeze, atrial flutter persists with 2:

1 conduction, rapid ventricular response





Echocardiography revealed systolic LV size dysfunction with LVEF 30-35%, LAE, 

mild MR, mild to moderate TR with RVSP 37 mmHg





- Current Medication List


 Current Medications





Albuterol Sulfate (Ventolin Hfa Inhaler -)  2 puff IH Q6H PRN


   PRN Reason: ASTHMA


   Last Admin: 10/21/18 17:04 Dose:  2 inh


Apixaban (Eliquis -)  5 mg PO BID [facility]


   Last Admin: 10/22/18 21:25 Dose:  5 mg


Budesonide/Formoterol Fumarate (Symbicort 160/4.5mcg -)  1 puff IH DAILY [facility]


   Last Admin: 10/22/18 12:57 Dose:  1 puff


Carvedilol (Coreg -)  6.25 mg PO BID [facility]


   Last Admin: 10/22/18 21:25 Dose:  6.25 mg


Ceftriaxone Sodium 2 gm/ (Dextrose)  100 mls @ 100 mls/hr IVPB DAILY [facility]; 

Protocol


Influenza Virus Vaccine Quadrival (Flulaval Quad [id number])  60 mcg IM .ONCE ONE


   Stop: 10/24/18 10:01


Loratadine (Claritin -)  10 mg PO DAILY [facility]


   Last Admin: 10/22/18 09:30 Dose:  10 mg


Morphine Sulfate (Morphine Sulfate)  2 mg IVPUSH Q4H PRN


   PRN Reason: PAIN LEVEL 6-10


   Last Admin: 10/22/18 16:10 Dose:  2 mg


Morphine Sulfate (Morphine Sulfate)  1 mg IVPUSH Q4H PRN


   PRN Reason: PAIN LEVEL 1-5


   Last Admin: 10/22/18 14:41 Dose:  1 mg


Prednisone (Deltasone -)  60 mg PO DAILY [facility]


   Last Admin: 10/22/18 14:04 Dose:  60 mg


Valsartan (Diovan -)  40 mg PO DAILY [facility]


   Last Admin: 10/21/18 10:09 Dose:  40 mg





Review of Systems





Cardiovascular: As noted above


Respiratory: denies: reports: Cough and Sputum Production 


Gastrointestinal: denies: Nausea, Vomiting, Diarrhea, Constipation or Abdominal 

Discomfort    


Musculoskeletal: No Symptoms Reported


Endocrine: No Symptoms Reported





- Objective


Vital Signs: 


 


 Last Vital Signs











Temp Pulse Resp BP Pulse Ox


 


 98.0 F   110 H  17   125/97   96 


 


 10/23/18 06:03  10/23/18 06:03  10/23/18 06:03  10/23/18 06:03  10/22/18 21:00








 Intake & Output











 10/20/18 10/21/18 10/22/18 10/23/18





 23:59 23:59 23:59 23:59


 


Intake Total   500 


 


Output Total   800 


 


Balance   -300 


 


Weight 245 lb  241 lb 14.4 oz 











Constitutional: No Distress


Respiratory: Bilateral Scattered Rhonchi and Expiratory Wheeze


Cardiovascular: S1 S2 Regular Rate and Rhythm Tachycardia


Gastrointestinal: Soft Benign Normal Bowel Sounds


Ext: No Edema intact distal pulses no calf tenderness





Labs: 


 


 CBC, BMP





 10/23/18 05:30 





BMP pending from this AM


 Hepatic Panel











Total Bilirubin  0.6 mg/dL (0.2-1)   10/22/18  06:00    


 


AST  19 U/L (15-37)   10/22/18  06:00    


 


ALT  27 U/L (13-61)   10/22/18  06:00    


 


Alkaline Phosphatase  58 U/L ()   10/22/18  06:00    


 


Albumin  3.2 g/dl (3.4-5.0)  L  10/22/18  06:00    











Assessment/Plan





Assessment: 





1. Dyspnea acute exacerbation of asthma with left upper lobe pneumonia


2. Persistent atrial flutter with RVR with prior history of paroxysmal atrial 

fibrillation on anticoagulation with DOAC's/Eliquis [id number] score of 1-2


3. Dilated probably non ischemic cardiomyopathy, possible tachycardia induced 

myopathy


4. HTN


5. History of bronchial asthma with acute exacerbation as noted above


6. Preliminary HIV positive test


7. To exclude tuberculosis 





PLAN:





1. Continue Coreg and titrate as tolerated and as needed


2. Initiate Entresto in substitution for Diovan


3. Continue Eliquis


4. Will discuss with ID service in reference to timing of procedure RENIE guided 

synchronized cardioversion (since patient currently on respiratory isolation), 

above was reviewed in detail with the patient 














Megan Schmidt M.D.

## 2018-10-23 NOTE — PN
Physical Exam: 


SUBJECTIVE: Patient seen and examined this morning at bedside. No new 

complaints. No overnight events as per nursing staff.





Denies fevers, chills, chest pain, palpitations SOB, nausea, vomiting, diarrhea

, constipation, abdominal pain, dysuria.








OBJECTIVE:





 Vital Signs











 Period  Temp  Pulse  Resp  BP Sys/Tobias  Pulse Ox


 


 Last 24 Hr  98.0 F-98.1 F  110-126  17-18  104-136/77-97  











GENERAL: A&Ox3, NAD


HEAD: NCAT


EYES: PERRL, EOMI


EARS, NOSE, THROAT: Oropharynx clear without exudates. Moist mucous membranes.


NECK: No JVD


LUNGS: Air entry improving. No wheezes.


HEART: Regular rate and rhythm, normal S1 and S2 without murmur


ABDOMEN: Soft, nontender, not distended, + bowel sounds, no guarding


EXTREMITIES: 2+ pulses, No edema.


NEUROLOGICAL:  Cranial nerves II-XII intact. Normal speech. C5-T1 and L4-S1 

gross sensation intact. 5/5 Muscle strength to Handgrip, elbow flexion/extension

, Hip flexion, Dorsiflexion, plantarflexion.


SKIN: Warm, dry, no rashes or lesions noted








 Laboratory Results - last 24 hr











  10/23/18 10/23/18





  05:30 05:30


 


WBC  9.1 


 


RBC  4.61 


 


Hgb  14.0 


 


Hct  43.1 


 


MCV  93.5 


 


MCH  30.3 


 


MCHC  32.4 


 


RDW  13.3 


 


Plt Count  259 


 


MPV  7.8 


 


Absolute Neuts (auto)  6.8 


 


Neutrophils %  75.2 


 


Lymphocytes %  12.2  D 


 


Monocytes %  12.2 H 


 


Eosinophils %  0.1  D 


 


Basophils %  0.3 


 


Nucleated RBC %  0 


 


Sodium   140


 


Potassium   4.4


 


Chloride   102


 


Carbon Dioxide   29


 


Anion Gap   9


 


BUN   13


 


Creatinine   0.7


 


Creat Clearance w eGFR   > 60


 


Random Glucose   98


 


Calcium   8.2 L


 


Phosphorus   3.6


 


Magnesium   2.5 H


 


Total Bilirubin   0.5


 


AST   16


 


ALT   29


 


Alkaline Phosphatase   58


 


Total Protein   6.2 L


 


Albumin   3.1 L











 Microbiology





10/22/18 15:20   Sputum - Expectorated   AFB Smear Concentration - Preliminary


10/22/18 15:20   Sputum - Expectorated   Direct Acid Fast Bacilli Smear - Final


10/22/18 15:20   Sputum - Expectorated   Mycobacterial Culture - Preliminary


10/22/18 21:45   Sputum - Expectorated   AFB Smear Concentration - Preliminary


10/22/18 21:45   Sputum - Expectorated   Direct Acid Fast Bacilli Smear - Final


10/22/18 21:45   Sputum - Expectorated   Mycobacterial Culture - Preliminary


10/23/18 07:00   Sputum - Expectorated   AFB Smear Concentration - Preliminary


10/23/18 07:00   Sputum - Expectorated   Direct Acid Fast Bacilli Smear - Final


10/23/18 07:00   Sputum - Expectorated   Mycobacterial Culture - Preliminary


10/21/18 07:14   Blood - Peripheral Venous   Blood Culture - Preliminary


                            NO GROWTH OBTAINED AFTER 48 HOURS, INCUBATION TO 

CONTINUE


                            FOR 3 DAYS.


10/21/18 07:14   Blood - Peripheral Venous   Blood Culture - Preliminary


                            NO GROWTH OBTAINED AFTER 48 HOURS, INCUBATION TO 

CONTINUE


                            FOR 3 DAYS.


10/21/18 18:32   Sputum - Expectorated   Gram Stain - Final


10/21/18 18:32   Sputum - Expectorated   Sputum Culture - Preliminary


                            NORMAL RESPIRATORY YOLANDA


10/21/18 18:50   Urine - Urine Clean Catch   Legionella Antigen - Final


10/21/18 18:50   Urine - Urine Clean Catch   Streptococcus pneumoniae Antigen (

M - Final


10/21/18 00:11   Urine - Urine Clean Catch   Urine Culture - Final


10/21/18 00:09   Nasopharyngeal Swab   Influenza Types A,B Antigen - Final


10/21/18 00:09   Nasopharyngeal Swab    - Final








Active Medications





Albuterol Sulfate (Ventolin Hfa Inhaler -)  2 puff IH Q6H PRN


   PRN Reason: ASTHMA


   Last Admin: 10/21/18 17:04 Dose:  2 inh


Apixaban (Eliquis -)  5 mg PO BID [facility]


   Last Admin: 10/23/18 09:55 Dose:  5 mg


Budesonide/Formoterol Fumarate (Symbicort 160/4.5mcg -)  1 puff IH DAILY [facility]


   Last Admin: 10/23/18 09:56 Dose:  1 puff


Carvedilol (Coreg -)  12.5 mg PO BID [facility]


   Last Admin: 10/23/18 09:55 Dose:  12.5 mg


Ceftriaxone Sodium 2 gm/ (Dextrose)  100 mls @ 100 mls/hr IVPB DAILY [facility]; 

Protocol


   Last Admin: 10/23/18 09:56 Dose:  100 mls/hr


Influenza Virus Vaccine Quadrival (Flulaval Quad 0612-5251)  60 mcg IM .ONCE ONE


   Stop: 10/24/18 10:01


Loratadine (Claritin -)  10 mg PO DAILY [facility]


   Last Admin: 10/23/18 09:55 Dose:  10 mg


Morphine Sulfate (Morphine Sulfate)  2 mg IVPUSH Q4H PRN


   PRN Reason: PAIN LEVEL 6-10


   Last Admin: 10/22/18 16:10 Dose:  2 mg


Morphine Sulfate (Morphine Sulfate)  1 mg IVPUSH Q4H PRN


   PRN Reason: PAIN LEVEL 1-5


   Last Admin: 10/22/18 14:41 Dose:  1 mg


Prednisone (Deltasone -)  60 mg PO DAILY [facility]


   Last Admin: 10/23/18 09:55 Dose:  60 mg


Sacubitril/Valsartan (Entresto 24 Mg-26 Mg Tablet)  1 tab PO BID [facility]


   Last Admin: 10/23/18 09:56 Dose:  1 tab








IMAGING:


-EKG: SINUS TACHYCARDIA, INFERIOR INFARCT , AGE UNDETERMINED, ANTERIOR INFARCT (

CITED ON OR BEFORE 21-OCT-2018),  bpm, QTc 401


-ECHO: LV is mildly dilated, LV SF is severely reduced, Global hypokinesis of 

the LV, EF 30-35%, Reduced RV Systolic function, LA and RA are moderately 

dilated, Mild MR, Mild to Mod TR


-CXR: Left upper lobe presumed pneumonia. If findings do not resolve then 

further imaging with CT is suggested. 


-CT Chest without contrast: Extensive left upper lobe consolidation consistent 

with an acute pneumonia. Clinical correlation and follow-up recommended.  








ASSESSMENT/PLAN:


54 y/o M with PMHx of asthma, Afib (not on anticoagulation), HTN, presents with 

SOB for the past three weeks. 





1. Atrial Flutter with RVR


-Cardiology (Dr. Ricardo) consulted, appreciate rec's, Carvedilol 6.25 BID and 

titrate as needed. D/C Diovan, Started on Entresto. Continue Eliquis 5 mg BID. 

ERNIE +/- DC synchronized cardioversion pending ID rec's


-Started on Entresto


-Continue Carvedilol


-Continue Eliquis 5 mg BID


-Troponin < 0.02 x2


-BNP 2442.2 


-EKG: Sinus tachy


-ECHO: LV SF is severely reduced, Global hypokinesis of the LV, EF 30-35%


-Strict I&Os, Daily weights


-Will likely need Ischemic work up once HR improved and infection resolved





2. MELISSA pneumonia


-Isolation


-Ceftriaxone 2 gram IV daily (Started on 10/21)


-Continue Ventolin, Symbicort, Prednisone


-Completed Azithromycin course (10/21-10/22)


-Urine for pneumonia, legionella negative


-Flu swab negative


-Blood cultures NGTD


-Sputum cultures pending


-HIV Screen negative


-ID (Dr. Shen) consulted, appreciate rec's, would isolate, rocephin to 

continue, suspicion for TB is low should get quantiferon and smears from [facility] 

back in am, would proceed with cardioversion if needed


-Pulmonology (Dr. Benavidez) consulted, appreciate rec's, COLD AGGLUTININS, F/

U CHEST X-RAYS, F/U CHEST CT 6 WKS TO CONFIRM RESOLUTION ON INFILTRATES





3. Hyponatremia


-Resolved


-Urine electrolytes, serum and urine osmolality, urine creatinine noted





4. FEN


-PO fluids


-Continue to monitor for Hyponatremia


-Sodium controlled diet





5. PPx


-DVT: Eliquis





Dispo: telemetry-Inpatient














Visit type





- Emergency Visit


Emergency Visit: Yes


ED Registration Date: 10/21/18


Care time: The patient presented to the Emergency Department on the above date 

and was hospitalized for further evaluation of their emergent condition.





- New Patient


This patient is new to me today: No





- Critical Care


Critical Care patient: No





- Discharge Referral


Referred to [facility] No

## 2018-10-23 NOTE — PN
Progress Note (short form)





- Note


Progress Note: 





much calmer


breathing improved with steorids





 Vital Signs











 Period  Temp  Pulse  Resp  BP Sys/Tobias  Pulse Ox


 


 Last 24 Hr  98.0 F-98.2 F    17-18  104-136/77-97  








cor-rrr, tachycardia


lungs clear


abd soft,nt


ext no edema





 CBC, BMP





 10/23/18 05:30 





 10/23/18 05:30 





 Microbiology





10/22/18 15:20   Sputum - Expectorated   AFB Smear Concentration - Preliminary


10/22/18 15:20   Sputum - Expectorated   Direct Acid Fast Bacilli Smear - Final


10/22/18 15:20   Sputum - Expectorated   Mycobacterial Culture - Preliminary


10/22/18 21:45   Sputum - Expectorated   AFB Smear Concentration - Preliminary


10/22/18 21:45   Sputum - Expectorated   Direct Acid Fast Bacilli Smear - Final


10/22/18 21:45   Sputum - Expectorated   Mycobacterial Culture - Preliminary


10/23/18 07:00   Sputum - Expectorated   AFB Smear Concentration - Preliminary


10/23/18 07:00   Sputum - Expectorated   Direct Acid Fast Bacilli Smear - Final


10/23/18 07:00   Sputum - Expectorated   Mycobacterial Culture - Preliminary


10/21/18 07:14   Blood - Peripheral Venous   Blood Culture - Preliminary


                            NO GROWTH OBTAINED AFTER 48 HOURS, INCUBATION TO 

CONTINUE


                            FOR 3 DAYS.


10/21/18 07:14   Blood - Peripheral Venous   Blood Culture - Preliminary


                            NO GROWTH OBTAINED AFTER 48 HOURS, INCUBATION TO 

CONTINUE


                            FOR 3 DAYS.


10/21/18 18:32   Sputum - Expectorated   Gram Stain - Final


10/21/18 18:32   Sputum - Expectorated   Sputum Culture - Preliminary


                            NORMAL RESPIRATORY YOLANDA


10/21/18 18:50   Urine - Urine Clean Catch   Legionella Antigen - Final


10/21/18 18:50   Urine - Urine Clean Catch   Streptococcus pneumoniae Antigen (

M - Final


10/21/18 00:11   Urine - Urine Clean Catch   Urine Culture - Final


10/21/18 00:09   Nasopharyngeal Swab   Influenza Types A,B Antigen - Final


10/21/18 00:09   Nasopharyngeal Swab    - Final





a/p


RUL pneumonia


HIV negative!


suspicion for TB is low should get quantiferon and smears from [facility] back in am








would procedd with cardioversion if needed





will d/w cardiology

















Problem List





- Problems


(1) Pneumonia


Code(s): J18.9 - PNEUMONIA, UNSPECIFIED ORGANISM   


Qualifiers: 


   Pneumonia type: due to unspecified organism   Laterality: left   Lung 

location: upper lobe of lung   Qualified Code(s): J18.1 - Lobar pneumonia, 

unspecified organism   





(2) A-fib


Code(s): I48.91 - UNSPECIFIED ATRIAL FIBRILLATION

## 2018-10-23 NOTE — PN
Progress Note (short form)





- Note


Progress Note: 


Cough and breathing improved.  No CP.  


Feels overall better. 


No hemoptysis. 





 Intake & Output











 10/20/18 10/21/18 10/22/18 10/23/18





 23:59 23:59 23:59 23:59


 


Intake Total   500 


 


Output Total   800 


 


Balance   -300 


 


Weight 245 lb  241 lb 14.4 oz 234 lb 7 oz








 Last Vital Signs











Temp Pulse Resp BP Pulse Ox


 


 98.1 F   125 H  18   124/77   100 


 


 10/23/18 13:11  10/23/18 13:11  10/23/18 13:11  10/23/18 13:11  10/23/18 09:00








Active Medications





Albuterol Sulfate (Ventolin Hfa Inhaler -)  2 puff IH Q6H PRN


   PRN Reason: ASTHMA


   Last Admin: 10/21/18 17:04 Dose:  2 inh


Apixaban (Eliquis -)  5 mg PO BID [facility]


   Last Admin: 10/23/18 09:55 Dose:  5 mg


Budesonide/Formoterol Fumarate (Symbicort 160/4.5mcg -)  1 puff IH DAILY [facility]


   Last Admin: 10/23/18 09:56 Dose:  1 puff


Carvedilol (Coreg -)  12.5 mg PO BID [facility]


   Last Admin: 10/23/18 09:55 Dose:  12.5 mg


Ceftriaxone Sodium 2 gm/ (Dextrose)  100 mls @ 100 mls/hr IVPB DAILY [facility]; 

Protocol


   Last Admin: 10/23/18 09:56 Dose:  100 mls/hr


Influenza Virus Vaccine Quadrival (Flulaval Quad 6914-3687)  60 mcg IM .ONCE ONE


   Stop: 10/24/18 10:01


Loratadine (Claritin -)  10 mg PO DAILY [facility]


   Last Admin: 10/23/18 09:55 Dose:  10 mg


Morphine Sulfate (Morphine Sulfate)  2 mg IVPUSH Q4H PRN


   PRN Reason: PAIN LEVEL 6-10


   Last Admin: 10/22/18 16:10 Dose:  2 mg


Morphine Sulfate (Morphine Sulfate)  1 mg IVPUSH Q4H PRN


   PRN Reason: PAIN LEVEL 1-5


   Last Admin: 10/22/18 14:41 Dose:  1 mg


Prednisone (Deltasone -)  60 mg PO DAILY [facility]


   Last Admin: 10/23/18 09:55 Dose:  60 mg


Sacubitril/Valsartan (Entresto 24 Mg-26 Mg Tablet)  1 tab PO BID [facility]


   Last Admin: 10/23/18 09:56 Dose:  1 tab














Constitutional: Yes: No Distress


Neck: Yes: Supple


Cardiovascular: Yes: S1S2, AFlutter  


Respiratory: Yes: few scattered rhonchi on the left 


Gastrointestinal: Yes: Normal Bowel Sounds, Soft


Edema: No


Labs: 


  


 Laboratory Results - last 24 hr











  10/23/18 10/23/18





  05:30 05:30


 


WBC  9.1 


 


RBC  4.61 


 


Hgb  14.0 


 


Hct  43.1 


 


MCV  93.5 


 


MCH  30.3 


 


MCHC  32.4 


 


RDW  13.3 


 


Plt Count  259 


 


MPV  7.8 


 


Absolute Neuts (auto)  6.8 


 


Neutrophils %  75.2 


 


Lymphocytes %  12.2  D 


 


Monocytes %  12.2 H 


 


Eosinophils %  0.1  D 


 


Basophils %  0.3 


 


Nucleated RBC %  0 


 


Sodium   140


 


Potassium   4.4


 


Chloride   102


 


Carbon Dioxide   29


 


Anion Gap   9


 


BUN   13


 


Creatinine   0.7


 


Creat Clearance w eGFR   > 60


 


Random Glucose   98


 


Calcium   8.2 L


 


Phosphorus   3.6


 


Magnesium   2.5 H


 


Total Bilirubin   0.5


 


AST   16


 


ALT   29


 


Alkaline Phosphatase   58


 


Total Protein   6.2 L


 


Albumin   3.1 L

















 Problem List 





- Problems


(1) A-fib


Code(s): I48.91 - UNSPECIFIED ATRIAL FIBRILLATION   





(2) Atrial fibrillation


Code(s): I48.91 - UNSPECIFIED ATRIAL FIBRILLATION   


Qualifiers: 


   Atrial fibrillation type: paroxysmal   Qualified Code(s): I48.0 - Paroxysmal 

atrial fibrillation   





(3) Atrial flutter


Code(s): I48.92 - UNSPECIFIED ATRIAL FLUTTER   


Qualifiers: 


   Atrial flutter type: typical   Qualified Code(s): I48.3 - Typical atrial 

flutter   





(4) HTN (hypertension)


Code(s): I10 - ESSENTIAL (PRIMARY) HYPERTENSION   


Qualifiers: 


   Hypertension type: essential hypertension   Qualified Code(s): I10 - 

Essential (primary) hypertension   





(5) Pneumonia


Code(s): J18.9 - PNEUMONIA, UNSPECIFIED ORGANISM   


Qualifiers: 


   Pneumonia type: due to unspecified organism   Laterality: left   Lung 

location: upper lobe of lung   Qualified Code(s): J18.1 - Lobar pneumonia, 

unspecified organism   





(6) Asthma


Code(s): J45.909 - UNSPECIFIED ASTHMA, UNCOMPLICATED   








IMP DYSPNEA


      MELISSA PNEUMONIA


      AFLUTTER


      ASTHMA


      HTN








PLAN  IV ABX PER ID 


        FOLLOW CULTURES


        INHALED BRONCHODILATORS


        O2


        RATE CONTROL AS PER CARDIOLOGY


        AC


        F/U CHEST CT 6 WKS TO CONFIRM RESOLUTION ON INFILTRATES


        FOR POSSIBLE DCCV 








DR JIMENES

## 2018-10-23 NOTE — PN
Teaching Attending Note


Name of Resident: Valentine Gee





ATTENDING PHYSICIAN STATEMENT





I saw and evaluated the patient.


I reviewed the resident's note and discussed the case with the resident.


I agree with the resident's findings and plan as documented with exceptions 

below.








SUBJECTIVE:


Patient seen and examined. Breathing improved, Palpitations better, no chest 

pain or new concerns. 





OBJECTIVE:


 Vital Signs











 Period  Temp  Pulse  Resp  BP Sys/Tobias  Pulse Ox


 


 Last 24 Hr  97.9 F-98.2 F    16-17  104-125/70-97  96-98








 Intake & Output











 10/20/18 10/21/18 10/22/18 10/23/18





 23:59 23:59 23:59 23:59


 


Intake Total   500 


 


Output Total   800 


 


Balance   -300 


 


Weight 245 lb  241 lb 14.4 oz 








General: sitting in bed breathing better, minimal use of accessory muscles of 

respiration


Chest:  improved air entry, few left sided rales


Abdomen: soft, NT, ND, positive bowel sounds


CVS: S1s2 irregular, tachycardic


Extremities: no edema





 Home Medications











 Medication  Instructions  Recorded


 


Albuterol Sulfate Inhaler - 1 - 2 inh PO QID PRN 10/13/14





[Ventolin HFA Inhaler -]  


 


Budesonide/Formeterol Fumarate 1 inh PO DAILY 11/20/17





[SYMBICORT 160/4.5mcg -]  


 


Diltiazem Cd [Cardizem Cd -] 300 mg PO DAILY 11/20/17


 


Loratadine [Claritin] 10 mg PO DAILY 10/21/18


 


Valsartan 40 mg PO DAILY 10/21/18








Active Medications





Albuterol Sulfate (Ventolin Hfa Inhaler -)  2 puff IH Q6H PRN


   PRN Reason: ASTHMA


   Last Admin: 10/21/18 17:04 Dose:  2 inh


Apixaban (Eliquis -)  5 mg PO BID [facility]


   Last Admin: 10/22/18 21:25 Dose:  5 mg


Budesonide/Formoterol Fumarate (Symbicort 160/4.5mcg -)  1 puff IH DAILY [facility]


   Last Admin: 10/22/18 12:57 Dose:  1 puff


Carvedilol (Coreg -)  12.5 mg PO BID [facility]


Ceftriaxone Sodium 2 gm/ (Dextrose)  100 mls @ 100 mls/hr IVPB DAILY [facility]; 

Protocol


Influenza Virus Vaccine Quadrival (Flulaval Quad [id number])  60 mcg IM .ONCE ONE


   Stop: 10/24/18 10:01


Loratadine (Claritin -)  10 mg PO DAILY [facility]


   Last Admin: 10/22/18 09:30 Dose:  10 mg


Morphine Sulfate (Morphine Sulfate)  2 mg IVPUSH Q4H PRN


   PRN Reason: PAIN LEVEL 6-10


   Last Admin: 10/22/18 16:10 Dose:  2 mg


Morphine Sulfate (Morphine Sulfate)  1 mg IVPUSH Q4H PRN


   PRN Reason: PAIN LEVEL 1-5


   Last Admin: 10/22/18 14:41 Dose:  1 mg


Prednisone (Deltasone -)  60 mg PO DAILY [facility]


   Last Admin: 10/22/18 14:04 Dose:  60 mg


Sacubitril/Valsartan (Entresto 24 Mg-26 Mg Tablet)  1 tab PO BID [facility]





 Laboratory Results - last 24 hr











  10/22/18 10/23/18 10/23/18





  06:00 05:30 05:30


 


WBC   9.1 


 


RBC   4.61 


 


Hgb   14.0 


 


Hct   43.1 


 


MCV   93.5 


 


MCH   30.3 


 


MCHC   32.4 


 


RDW   13.3 


 


Plt Count   259 


 


MPV   7.8 


 


Absolute Neuts (auto)   6.8 


 


Neutrophils %   75.2 


 


Lymphocytes %   12.2  D 


 


Monocytes %   12.2 H 


 


Eosinophils %   0.1  D 


 


Basophils %   0.3 


 


Nucleated RBC %   0 


 


Sodium    140


 


Potassium    4.4


 


Chloride    102


 


Carbon Dioxide    29


 


Anion Gap    9


 


BUN    13


 


Creatinine    0.7


 


Creat Clearance w eGFR    > 60


 


Random Glucose    98


 


Calcium    8.2 L


 


Phosphorus    3.6


 


Magnesium    2.5 H


 


Total Bilirubin    0.5


 


AST    16


 


ALT    29


 


Alkaline Phosphatase    58


 


Total Protein    6.2 L


 


Albumin    3.1 L


 


HIV 1&2 Antibody Screen  Negative  


 


HIV P24 Antigen  Negative  








 Microbiology





10/22/18 21:45   Sputum - Expectorated   AFB Smear Concentration - Preliminary


10/22/18 21:45   Sputum - Expectorated   Mycobacterial Culture - Preliminary


10/22/18 15:20   Sputum - Expectorated   AFB Smear Concentration - Preliminary


10/22/18 15:20   Sputum - Expectorated   Mycobacterial Culture - Preliminary


10/21/18 18:32   Sputum - Expectorated   Gram Stain - Final


10/21/18 18:50   Urine - Urine Clean Catch   Legionella Antigen - Final


10/21/18 18:50   Urine - Urine Clean Catch   Streptococcus pneumoniae Antigen (

M - Final


10/21/18 07:14   Blood - Peripheral Venous   Blood Culture - Preliminary


                            NO GROWTH OBTAINED AFTER 24 HOURS, INCUBATION TO 

CONTINUE


                            FOR 4 DAYS.


10/21/18 07:14   Blood - Peripheral Venous   Blood Culture - Preliminary


                            NO GROWTH OBTAINED AFTER 24 HOURS, INCUBATION TO 

CONTINUE


                            FOR 4 DAYS.


10/21/18 00:11   Urine - Urine Clean Catch   Urine Culture - Final


10/21/18 00:09   Nasopharyngeal Swab   Influenza Types A,B Antigen - Final


10/21/18 00:09   Nasopharyngeal Swab    - Final





2D echo results reviewed





ASSESSMENT AND PLAN:


53 yom with PMHx of paroxysmal Afib not on AC (prior h/o being on coumadin), 

Asthma/bronchitis, admitted with MELISSA CAP and Atrial flutter with RVR.





-MELISSA PNA with early sepsis


-Atrial flutter with RVR


-New cardiomyopathy, ?Ischemic vs tachycardia induced


-Hyponatremia, hypovolumic vs r/o Legionella


-Suspected acute COPD exacerbation (heavy smoking history now with decreased 

entry and improvement with steroids)


-H/o Bronchial Asthma/Bronchitis


-HTN





PLan:


Ceftriaxone day 3, ID input noted, Off azithromycin for now.


Blood/sputum cx Sputum AFB stain neg, but low suspicion based on presentation. 


Urine PNA studies/Flu swab neg.


HIV screen neg.


Isolation precautions per ID.


Pulmonary input noted. Symbicort/Albuterol prn. 


Continue Prednisone


Cardiology input noted.


2D noted. New cardiomyopathy.


Started on coreg/eliquis. Valsartan changed to entresto.


ERNIE/DCCV per cardiology. 


Will need ischemic work up once rate improved and infectious concerns better


Lasix prn per volume status. Strict I/Os and daily weights.


DVTPPX started on eliquis as above


Dispo pending clinical improvement.


Plan discussed with patient in detail, all questions answered.

## 2018-10-24 RX ADMIN — CARVEDILOL SCH MG: 12.5 TABLET, FILM COATED ORAL at 09:52

## 2018-10-24 RX ADMIN — APIXABAN SCH MG: 5 TABLET, FILM COATED ORAL at 21:52

## 2018-10-24 RX ADMIN — APIXABAN SCH MG: 5 TABLET, FILM COATED ORAL at 09:52

## 2018-10-24 RX ADMIN — PREDNISONE SCH MG: 20 TABLET ORAL at 09:53

## 2018-10-24 RX ADMIN — BUDESONIDE AND FORMOTEROL FUMARATE DIHYDRATE SCH PUFF: 160; 4.5 AEROSOL RESPIRATORY (INHALATION) at 09:54

## 2018-10-24 RX ADMIN — IPRATROPIUM BROMIDE SCH AMP: 0.5 SOLUTION RESPIRATORY (INHALATION) at 19:00

## 2018-10-24 RX ADMIN — IPRATROPIUM BROMIDE SCH: 0.5 SOLUTION RESPIRATORY (INHALATION) at 20:50

## 2018-10-24 RX ADMIN — SACUBITRIL AND VALSARTAN SCH TAB: 24; 26 TABLET, FILM COATED ORAL at 21:52

## 2018-10-24 RX ADMIN — CARVEDILOL SCH MG: 12.5 TABLET, FILM COATED ORAL at 21:52

## 2018-10-24 RX ADMIN — SACUBITRIL AND VALSARTAN SCH TAB: 24; 26 TABLET, FILM COATED ORAL at 09:53

## 2018-10-24 RX ADMIN — CEFTRIAXONE SODIUM SCH MLS/HR: 2 INJECTION, POWDER, FOR SOLUTION INTRAMUSCULAR; INTRAVENOUS at 09:54

## 2018-10-24 RX ADMIN — LORATADINE SCH MG: 10 TABLET ORAL at 09:52

## 2018-10-24 NOTE — PN
Physical Exam: 


SUBJECTIVE: Patient seen and examined this morning at bedside. Continues to 

have wheezes. No overnight events as per nursing staff.








OBJECTIVE:





 Vital Signs











 Period  Temp  Pulse  Resp  BP Sys/Tobias  Pulse Ox


 


 Last 24 Hr  98.1 F-98.5 F  119-132  18-26  113-143/  100-100











GENERAL: A&Ox3, NAD


HEAD: NCAT


EYES: PERRL, EOMI


EARS, NOSE, THROAT: Oropharynx clear without exudates. Moist mucous membranes.


NECK: No JVD


LUNGS: Air entry improving. Wheezes heard at the bases


HEART: Regular rate and rhythm, normal S1 and S2 without murmur


ABDOMEN: Soft, nontender, not distended, + bowel sounds, no guarding


EXTREMITIES: 2+ pulses, No edema.


NEUROLOGICAL:  Cranial nerves II-XII intact. Normal speech. C5-T1 and L4-S1 

gross sensation intact. 5/5 Muscle strength to Handgrip, elbow flexion/extension

, Hip flexion, Dorsiflexion, plantarflexion.


SKIN: Warm, dry, no rashes or lesions noted








 Laboratory Results - last 24 hr











  10/21/18





  09:40


 


TB Test (QFT) Nil  0.05


 


TB Test (QFT) Mitogen  >10.0


 


TB Test (QFT)  Negative


 


TB Positive Criteria  


 


TB Test (QFT) Interp  No Result Required.








 Microbiology





10/23/18 07:00   Sputum - Expectorated   AFB Smear Concentration - Final


10/23/18 07:00   Sputum - Expectorated   Direct Acid Fast Bacilli Smear - Final


10/23/18 07:00   Sputum - Expectorated   Mycobacterial Culture - Preliminary


10/22/18 15:20   Sputum - Expectorated   AFB Smear Concentration - Final


10/22/18 15:20   Sputum - Expectorated   Direct Acid Fast Bacilli Smear - Final


10/22/18 15:20   Sputum - Expectorated   Mycobacterial Culture - Preliminary


10/22/18 21:45   Sputum - Expectorated   AFB Smear Concentration - Final


10/22/18 21:45   Sputum - Expectorated   Direct Acid Fast Bacilli Smear - Final


10/22/18 21:45   Sputum - Expectorated   Mycobacterial Culture - Preliminary


10/21/18 18:32   Sputum - Expectorated   Gram Stain - Final


10/21/18 18:32   Sputum - Expectorated   Sputum Culture - Final


                            NORMAL RESPIRATORY YOLANDA


10/21/18 07:14   Blood - Peripheral Venous   Blood Culture - Preliminary


                            NO GROWTH OBTAINED AFTER 72 HOURS, INCUBATION TO 

CONTINUE


                            FOR 2 DAYS.


10/21/18 07:14   Blood - Peripheral Venous   Blood Culture - Preliminary


                            NO GROWTH OBTAINED AFTER 72 HOURS, INCUBATION TO 

CONTINUE


                            FOR 2 DAYS.


10/21/18 18:50   Urine - Urine Clean Catch   Legionella Antigen - Final


10/21/18 18:50   Urine - Urine Clean Catch   Streptococcus pneumoniae Antigen (

M - Final


10/21/18 00:11   Urine - Urine Clean Catch   Urine Culture - Final


10/21/18 00:09   Nasopharyngeal Swab   Influenza Types A,B Antigen - Final


10/21/18 00:09   Nasopharyngeal Swab    - Final











Active Medications





Apixaban (Eliquis -)  5 mg PO BID [facility]


   Last Admin: 10/24/18 09:52 Dose:  5 mg


Carvedilol (Coreg -)  12.5 mg PO BID [facility]


   Last Admin: 10/24/18 09:52 Dose:  12.5 mg


Ceftriaxone Sodium 2 gm/ (Dextrose)  100 mls @ 100 mls/hr IVPB DAILY [facility]; 

Protocol


   Last Admin: 10/24/18 09:54 Dose:  100 mls/hr


Diltiazem HCl 125 mg/ Sodium (Chloride)  125 mls @ 5 mls/hr IVPB TITR [facility]; 

Protocol


Ipratropium Bromide (Atrovent 0.02% Nebulizer -)  1 amp NEB RQID [facility]


Loratadine (Claritin -)  10 mg PO DAILY [facility]


   Last Admin: 10/24/18 09:52 Dose:  10 mg


Prednisone 40 mg/ Prednisone (10 mg)  50 mg PO DAILY [facility]


Sacubitril/Valsartan (Entresto 24 Mg-26 Mg Tablet)  1 tab PO BID [facility]


   Last Admin: 10/24/18 09:53 Dose:  1 tab








IMAGING:


-EKG: SINUS TACHYCARDIA, INFERIOR INFARCT , AGE UNDETERMINED, ANTERIOR INFARCT (

CITED ON OR BEFORE 21-OCT-2018),  bpm, QTc 401


-ECHO: LV is mildly dilated, LV SF is severely reduced, Global hypokinesis of 

the LV, EF 30-35%, Reduced RV Systolic function, LA and RA are moderately 

dilated, Mild MR, Mild to Mod TR


-CXR: Left upper lobe presumed pneumonia. If findings do not resolve then 

further imaging with CT is suggested. 


-CT Chest without contrast: Extensive left upper lobe consolidation consistent 

with an acute pneumonia. Clinical correlation and follow-up recommended.  








ASSESSMENT/PLAN:


52 y/o M with PMHx of asthma, Afib (not on anticoagulation), HTN, presents with 

SOB for the past three weeks. 





1. Atrial Flutter with RVR


-Cardiology (Dr. Ricardo) consulted, appreciate rec's, Carvedilol 12.5mg BID and 

titrate as needed. D/C Diovan, Started on Entresto. Continue Eliquis 5 mg BID. 

ERNIE +/- DC synchronized cardioversion pending tmrw


-Cardizen GTT started 


-Continue Carvedilol, Entresto


-Continue Eliquis 5 mg BID


-Troponin < 0.02 x2


-BNP 2442.2 


-EKG: Sinus tachy


-ECHO: LV SF is severely reduced, Global hypokinesis of the LV, EF 30-35%


-Strict I&Os, Daily weights


-Will likely need Ischemic work up once HR improved and infection resolved





2. MELISSA pneumonia


-D/C Isolation


-TB Quantiferon negative, AFB Gram stain negative


-Ceftriaxone 2 gram IV daily (Started on 10/21)


-Continue Ventolin, Symbicort, Prednisone


-Completed Azithromycin course (10/21-10/22)


-Urine for pneumonia, legionella negative


-Flu swab negative


-Blood cultures NGTD


-Sputum cultures pending


-HIV Screen negative


-ID (Dr. Shen) consulted, appreciate rec's, would isolate, rocephin to 

continue, suspicion for TB is low should get quantiferon and smears from [facility] 

back in am, would proceed with cardioversion if needed


-Pulmonology (Dr. Benavidez) consulted, appreciate rec's, COLD AGGLUTININS, F/

U CHEST X-RAYS, F/U CHEST CT 6 WKS TO CONFIRM RESOLUTION ON INFILTRATES





3. Hyponatremia


-Resolved


-Urine electrolytes, serum and urine osmolality, urine creatinine noted





4. FEN


-PO fluids


-Continue to monitor for Hyponatremia


-Sodium controlled diet





5. PPx


-DVT: Eliquis





Dispo: DCCV tmrw














Visit type





- Emergency Visit


Emergency Visit: Yes


ED Registration Date: 10/21/18


Care time: The patient presented to the Emergency Department on the above date 

and was hospitalized for further evaluation of their emergent condition.





- New Patient


This patient is new to me today: No





- Critical Care


Critical Care patient: No





- Discharge Referral


Referred to [facility] No

## 2018-10-24 NOTE — PN
Progress Note (short form)





- Note


Progress Note: 





much calmer


breathing improved with steroids


no complaints


still tachycardic








  Vital Signs











 Period  Temp  Pulse  Resp  BP Sys/Tobias  Pulse Ox


 


 Last 24 Hr  98.1 F-98.5 F  119-132  18-26  113-143/  100-100








cor-rrr


lungs decresed bs at bases


abd soft,nt


ext no edema 





 CBC, BMP





 10/23/18 05:30 





 10/23/18 05:30 





sputum avb negative times 3,(smear) quantiferon negative





 Microbiology





10/21/18 18:32   Sputum - Expectorated   Gram Stain - Final


10/21/18 18:32   Sputum - Expectorated   Sputum Culture - Final


                            NORMAL RESPIRATORY YOLANDA


10/21/18 07:14   Blood - Peripheral Venous   Blood Culture - Preliminary


                            NO GROWTH OBTAINED AFTER 72 HOURS, INCUBATION TO 

CONTINUE


                            FOR 2 DAYS.


10/21/18 07:14   Blood - Peripheral Venous   Blood Culture - Preliminary


                            NO GROWTH OBTAINED AFTER 72 HOURS, INCUBATION TO 

CONTINUE


                            FOR 2 DAYS.


10/22/18 15:20   Sputum - Expectorated   AFB Smear Concentration - Preliminary


10/22/18 15:20   Sputum - Expectorated   Direct Acid Fast Bacilli Smear - Final


10/22/18 15:20   Sputum - Expectorated   Mycobacterial Culture - Preliminary


10/22/18 21:45   Sputum - Expectorated   AFB Smear Concentration - Preliminary


10/22/18 21:45   Sputum - Expectorated   Direct Acid Fast Bacilli Smear - Final


10/22/18 21:45   Sputum - Expectorated   Mycobacterial Culture - Preliminary


10/23/18 07:00   Sputum - Expectorated   AFB Smear Concentration - Preliminary


10/23/18 07:00   Sputum - Expectorated   Direct Acid Fast Bacilli Smear - Final


10/23/18 07:00   Sputum - Expectorated   Mycobacterial Culture - Preliminary


10/21/18 18:50   Urine - Urine Clean Catch   Legionella Antigen - Final


10/21/18 18:50   Urine - Urine Clean Catch   Streptococcus pneumoniae Antigen (

M - Final


10/21/18 00:11   Urine - Urine Clean Catch   Urine Culture - Final


10/21/18 00:09   Nasopharyngeal Swab   Influenza Types A,B Antigen - Final


10/21/18 00:09   Nasopharyngeal Swab    - Final











a/p


RUL pneumonia


d/c isolation


continue rocephin for pneumonia


f/u chest ct





atrial flutter- per cardiology























Problem List





- Problems


(1) Pneumonia


Code(s): J18.9 - PNEUMONIA, UNSPECIFIED ORGANISM   


Qualifiers: 


   Pneumonia type: due to unspecified organism   Laterality: left   Lung 

location: upper lobe of lung   Qualified Code(s): J18.1 - Lobar pneumonia, 

unspecified organism   





(2) A-fib


Code(s): I48.91 - UNSPECIFIED ATRIAL FIBRILLATION

## 2018-10-24 NOTE — PN
Progress Note, Physician


History of Present Illness: 


Remains in rapid aflutter despite efforts at rate-control, reports dyspnea, 

light-headedness, palpitations and chest tightness. Low suspicion for TB.








- Current Medication List


Current Medications: 


Active Medications





Albuterol Sulfate (Ventolin Hfa Inhaler -)  2 puff IH Q6H PRN


   PRN Reason: ASTHMA


   Last Admin: 10/21/18 17:04 Dose:  2 inh


Apixaban (Eliquis -)  [facility]ID [facility]


   Last Admin: 10/24/18 09:52 Dose:  5 mg


Budesonide/Formoterol Fumarate (Symbicort 160/4.5mcg -)  1 puff IH DAILY [facility]


   Last Admin: 10/24/18 09:54 Dose:  1 puff


Carvedilol (Coreg -)  12.5 mg PO BID [facility]


   Last Admin: 10/24/18 09:52 Dose:  12.5 mg


Ceftriaxone Sodium 2 gm/ (Dextrose)  100 mls @ 100 mls/hr IVPB DAILY [facility]; 

Protocol


   Last Admin: 10/24/18 09:54 Dose:  100 mls/hr


Loratadine (Claritin -)  10 mg PO DAILY [facility]


   Last Admin: 10/24/18 09:52 Dose:  10 mg


Prednisone (Deltasone -)  60 mg PO DAILY [facility]


   Last Admin: 10/24/18 09:53 Dose:  60 mg


Sacubitril/Valsartan (Entresto 24 Mg-26 Mg Tablet)  1 tab PO BID [facility]


   Last Admin: 10/24/18 09:53 Dose:  1 tab











- Objective


Vital Signs: 


 Vital Signs











Temperature  98.5 F   10/24/18 06:00


 


Pulse Rate  126 H  10/24/18 06:00


 


Respiratory Rate  22 H  10/24/18 06:00


 


Blood Pressure  141/112 H  10/24/18 06:00


 


O2 Sat by Pulse Oximetry (%)  100   10/23/18 22:00











Constitutional: Yes: No Distress, Calm


Neck: Yes: Supple


Cardiovascular: Yes: Tachycardia


Respiratory: Yes: Regular, CTA Bilaterally


Gastrointestinal: Yes: Normal Bowel Sounds, Soft


Edema: No


Labs: 


 CBC, BMP





 10/23/18 05:30 





 10/23/18 05:30 





 INR, PTT











INR  1.43  (0.83-1.09)  H  10/22/18  06:00    














- ....Imaging


EKG: Report Reviewed (Rapid aflutter)





Problem List





- Problems


(1) Asthma


Code(s): J45.909 - UNSPECIFIED ASTHMA, UNCOMPLICATED   


Qualifiers: 


   Asthma severity: mild   Asthma persistence: intermittent 





(2) Atrial flutter


Code(s): I48.92 - UNSPECIFIED ATRIAL FLUTTER   


Qualifiers: 


   Atrial flutter type: typical   Qualified Code(s): I48.3 - Typical atrial 

flutter   





(3) HTN (hypertension)


Code(s): I10 - ESSENTIAL (PRIMARY) HYPERTENSION   


Qualifiers: 


   Hypertension type: essential hypertension   Qualified Code(s): I10 - 

Essential (primary) hypertension   





(4) Pneumonia


Code(s): J18.9 - PNEUMONIA, UNSPECIFIED ORGANISM   


Qualifiers: 


   Pneumonia type: due to unspecified organism   Laterality: left   Lung 

location: upper lobe of lung   Qualified Code(s): J18.1 - Lobar pneumonia, 

unspecified organism   





Assessment/Plan


Echocardiography revealed systolic LV size dysfunction with LVEF 30-35%, LAE, 

mild MR, mild to moderate TR with RVSP 37 mmHg





1. Dyspnea referable to acute exacerbation of asthma with left upper lobe 

pneumonia


2. Persistent atrial flutter with RVR with prior history of paroxysmal atrial 

fibrillation on anticoagulation with DOAC's/Eliquis [id number] score of 1-2


3. Dilated probably non ischemic cardiomyopathy, possible tachycardia-induced 

myopathy


4. HTN


5. Low suspicion for TB, HIV negative





PLAN:





1. Start cardizem gtt for rate control


2. Continue Coreg 12.5 bid and Entresto 24/26 bid with uptitration as tolerated 

and as needed


3. Continue Eliquis 5 bid


4. Plan for ERNIE guided synchronized cardioversion tomorrow given difficulty 

with rate-control


5. Empiric antibiotic coverage, bronchodilators, oral steroids and O2 as needed


6. Ischemic evaluation once clinically improved

## 2018-10-24 NOTE — PN
Progress Note (short form)





- Note


Progress Note: 





PULMONARY





States breathing is improving. Heart rates still rapid, does feel palpitations.





 Vital Signs











 Period  Temp  Pulse  Resp  BP Sys/Tobias  Pulse Ox


 


 Last 24 Hr  98.1 F-98.5 F  119-132  18-26  113-143/  100-100








 Intake & Output











 10/21/18 10/22/18 10/23/18 10/24/18





 23:59 23:59 23:59 23:59


 


Intake Total  500 400 0


 


Output Total  800  


 


Balance  -300 400 0


 


Weight  109.724 kg 106.339 kg 108.7 kg








Gen:  mildly tachypneic with speaking


Heart: tachycardic, regular


Lung: decreased breath sounds at the bases


Abd: soft, nontender


Ext: no edema





 CBC, BMP





 10/23/18 05:30 





 10/23/18 05:30 





Active Medications





Albuterol Sulfate (Ventolin Hfa Inhaler -)  2 puff IH Q6H PRN


   PRN Reason: ASTHMA


   Last Admin: 10/21/18 17:04 Dose:  2 inh


Apixaban (Eliquis -)  5 mg PO BID Replaced by [facility]


   Last Admin: 10/24/18 09:52 Dose:  5 mg


Budesonide/Formoterol Fumarate (Symbicort 160/4.5mcg -)  1 puff IH DAILY Replaced by [facility]


   Last Admin: 10/24/18 09:54 Dose:  1 puff


Carvedilol (Coreg -)  12.5 mg PO BID Replaced by [facility]


   Last Admin: 10/24/18 09:52 Dose:  12.5 mg


Ceftriaxone Sodium 2 gm/ (Dextrose)  100 mls @ 100 mls/hr IVPB DAILY Replaced by [facility]; 

Protocol


   Last Admin: 10/24/18 09:54 Dose:  100 mls/hr


Diltiazem HCl 125 mg/ Sodium (Chloride)  125 mls @ 5 mls/hr IVPB TITR [facility]; 

Protocol


Loratadine (Claritin -)  10 mg PO DAILY Replaced by [facility]


   Last Admin: 10/24/18 09:52 Dose:  10 mg


Prednisone 40 mg/ Prednisone (10 mg)  50 mg PO DAILY Replaced by [facility]


Sacubitril/Valsartan (Entresto 24 Mg-26 Mg Tablet)  1 tab PO BID Replaced by [facility]


   Last Admin: 10/24/18 09:53 Dose:  1 tab





A/P


Pneumonia


Acute Asthma Exacerbation


Atrial Flutter with RVR


LV Systolic Dysfunction


HTN





-  continue antibiotics


-  prednisone taper


-  inhaled bronchodilators with ipratropium


-  will hold symbicort due to uncontrolled rates


-  for possible ERNIE/DCCV


-  continue anticoagulation


-  telemetry monitoring

## 2018-10-24 NOTE — PN
Teaching Attending Note


Name of Resident: Valentine Gee





ATTENDING PHYSICIAN STATEMENT





I saw and evaluated the patient.


I reviewed the resident's note and discussed the case with the resident.


I agree with the resident's findings and plan as documented with exceptions 

below.








SUBJECTIVE:


Patient seen and examined, breathing improved, intermittent palpitations, no 

chest pressure, but no new complaints. 





OBJECTIVE:


 Vital Signs











 Period  Temp  Pulse  Resp  BP Sys/Tobias  Pulse Ox


 


 Last 24 Hr  98.1 F-98.5 F  119-126  18-26  113-143/  100-100








 Intake & Output











 10/21/18 10/22/18 10/23/18 10/24/18





 23:59 23:59 23:59 23:59


 


Intake Total  500 400 0


 


Output Total  800  


 


Balance  -300 400 0


 


Weight  241 lb 14.4 oz 234 lb 7 oz 239 lb 10.279 oz








General: sitting in bed in no acute distress, no tachypnea, able to talk in 

full sentences


CVS: S1s2 regular tachycardic


chest: improved air entry all over, no rales or wheezing appreciated.


Abdomen; soft, obese, NT


Extremities: no edema





Active Medications





Albuterol Sulfate (Ventolin Hfa Inhaler -)  2 puff IH Q6H PRN


   PRN Reason: ASTHMA


   Last Admin: 10/21/18 17:04 Dose:  2 inh


Apixaban (Eliquis -)  5 mg PO BID [facility]


   Last Admin: 10/23/18 21:00 Dose:  5 mg


Budesonide/Formoterol Fumarate (Symbicort 160/4.5mcg -)  1 puff IH DAILY [facility]


   Last Admin: 10/23/18 09:56 Dose:  1 puff


Carvedilol (Coreg -)  12.5 mg PO BID [facility]


   Last Admin: 10/23/18 21:00 Dose:  12.5 mg


Ceftriaxone Sodium 2 gm/ (Dextrose)  100 mls @ 100 mls/hr IVPB DAILY [facility]; 

Protocol


   Last Admin: 10/23/18 09:56 Dose:  100 mls/hr


Influenza Virus Vaccine Quadrival (Flulaval Quad [id number])  60 mcg IM .ONCE ONE


   Stop: 10/24/18 10:01


Loratadine (Claritin -)  10 mg PO DAILY [facility]


   Last Admin: 10/23/18 09:55 Dose:  10 mg


Morphine Sulfate (Morphine Sulfate)  1 mg IVPUSH Q4H PRN


   PRN Reason: PAIN LEVEL 6-10


Prednisone (Deltasone -)  60 mg PO DAILY [facility]


   Last Admin: 10/23/18 09:55 Dose:  60 mg


Sacubitril/Valsartan (Entresto 24 Mg-26 Mg Tablet)  1 tab PO BID [facility]


   Last Admin: 10/23/18 21:00 Dose:  1 tab








 Microbiology





10/22/18 15:20   Sputum - Expectorated   AFB Smear Concentration - Preliminary


10/22/18 15:20   Sputum - Expectorated   Direct Acid Fast Bacilli Smear - Final


10/22/18 15:20   Sputum - Expectorated   Mycobacterial Culture - Preliminary


10/22/18 21:45   Sputum - Expectorated   AFB Smear Concentration - Preliminary


10/22/18 21:45   Sputum - Expectorated   Direct Acid Fast Bacilli Smear - Final


10/22/18 21:45   Sputum - Expectorated   Mycobacterial Culture - Preliminary


10/23/18 07:00   Sputum - Expectorated   AFB Smear Concentration - Preliminary


10/23/18 07:00   Sputum - Expectorated   Direct Acid Fast Bacilli Smear - Final


10/23/18 07:00   Sputum - Expectorated   Mycobacterial Culture - Preliminary


10/21/18 07:14   Blood - Peripheral Venous   Blood Culture - Preliminary


                            NO GROWTH OBTAINED AFTER 48 HOURS, INCUBATION TO 

CONTINUE


                            FOR 3 DAYS.


10/21/18 07:14   Blood - Peripheral Venous   Blood Culture - Preliminary


                            NO GROWTH OBTAINED AFTER 48 HOURS, INCUBATION TO 

CONTINUE


                            FOR 3 DAYS.


10/21/18 18:32   Sputum - Expectorated   Gram Stain - Final


10/21/18 18:32   Sputum - Expectorated   Sputum Culture - Preliminary


                            NORMAL RESPIRATORY YOLANDA


10/21/18 18:50   Urine - Urine Clean Catch   Legionella Antigen - Final


10/21/18 18:50   Urine - Urine Clean Catch   Streptococcus pneumoniae Antigen (

M - Final


10/21/18 00:11   Urine - Urine Clean Catch   Urine Culture - Final


10/21/18 00:09   Nasopharyngeal Swab   Influenza Types A,B Antigen - Final


10/21/18 00:09   Nasopharyngeal Swab    - Final











ASSESSMENT AND PLAN:


53 yom with PMHx of paroxysmal Afib not on AC (prior h/o being on coumadin), 

Asthma/bronchitis, admitted with MELISSA CAP and Atrial flutter with RVR, found 

with new cardiomyopathy





-MELISSA PNA with early sepsis


-Atrial flutter with RVR


-New cardiomyopathy, ?Ischemic vs tachycardia induced


-Hyponatremia, hypovolumic, legionella ruled out


-Suspected acute COPD exacerbation (heavy smoking history now with decreased 

entry and improvement with steroids)


-H/o Bronchial Asthma/Bronchitis


-HTN





PLan:


Ceftriaxone day 4, ID input noted, Off azithromycin for now.


Blood/sputum cx Sputum AFB stain neg, TB QFT neg, discussed with Dr. Shen, 

d/c airborne precautions.


Urine PNA studies/Flu swab neg.


HIV screen neg.


Pulmonary input noted. Symbicort/Albuterol prn. 


Taper prednisone to 50 mg daily.


Cardiology input noted.


2D noted. New cardiomyopathy.


Started on coreg/eliquis. Valsartan changed to entresto.


Plan for ERNIE/DCCV tomorrow. 


Will need ischemic work up once rate improved and infectious concerns better 

and repeat 2d Echo outpatient. 


Lasix prn per volume status. Strict I/Os and daily weights.


DVTPPX started on eliquis as above


Dispo pending clinical improvement.


Plan discussed with patient and nursing in detail, all questions answered.

## 2018-10-25 LAB
ALBUMIN SERPL-MCNC: 3 G/DL (ref 3.4–5)
ALP SERPL-CCNC: 53 U/L (ref 45–117)
ALT SERPL-CCNC: 36 U/L (ref 13–61)
ANION GAP SERPL CALC-SCNC: 10 MMOL/L (ref 8–16)
AST SERPL-CCNC: 22 U/L (ref 15–37)
BASOPHILS # BLD: 1.1 % (ref 0–2)
BILIRUB SERPL-MCNC: 0.3 MG/DL (ref 0.2–1)
BUN SERPL-MCNC: 15 MG/DL (ref 7–18)
CALCIUM SERPL-MCNC: 8.6 MG/DL (ref 8.5–10.1)
CHLORIDE SERPL-SCNC: 104 MMOL/L (ref 98–107)
CO2 SERPL-SCNC: 28 MMOL/L (ref 21–32)
CREAT SERPL-MCNC: 0.7 MG/DL (ref 0.55–1.3)
DEPRECATED RDW RBC AUTO: 13 % (ref 11.9–15.9)
EOSINOPHIL # BLD: 0.5 % (ref 0–4.5)
GLUCOSE SERPL-MCNC: 82 MG/DL (ref 74–106)
HCT VFR BLD CALC: 45.1 % (ref 35.4–49)
HGB BLD-MCNC: 14.9 GM/DL (ref 11.7–16.9)
LYMPHOCYTES # BLD: 22.1 % (ref 8–40)
MAGNESIUM SERPL-MCNC: 2.5 MG/DL (ref 1.8–2.4)
MCH RBC QN AUTO: 30.6 PG (ref 25.7–33.7)
MCHC RBC AUTO-ENTMCNC: 33.1 G/DL (ref 32–35.9)
MCV RBC: 92.7 FL (ref 80–96)
MONOCYTES # BLD AUTO: 13 % (ref 3.8–10.2)
NEUTROPHILS # BLD: 63.3 % (ref 42.8–82.8)
PHOSPHATE SERPL-MCNC: 3.6 MG/DL (ref 2.5–4.9)
PLATELET # BLD AUTO: 333 K/MM3 (ref 134–434)
PMV BLD: 7.6 FL (ref 7.5–11.1)
POTASSIUM SERPLBLD-SCNC: 3.9 MMOL/L (ref 3.5–5.1)
PROT SERPL-MCNC: 5.9 G/DL (ref 6.4–8.2)
RBC # BLD AUTO: 4.87 M/MM3 (ref 4–5.6)
SODIUM SERPL-SCNC: 141 MMOL/L (ref 136–145)
WBC # BLD AUTO: 8.6 K/MM3 (ref 4–10)

## 2018-10-25 PROCEDURE — 5A2204Z RESTORATION OF CARDIAC RHYTHM, SINGLE: ICD-10-PCS | Performed by: INTERNAL MEDICINE

## 2018-10-25 RX ADMIN — CARVEDILOL SCH MG: 12.5 TABLET, FILM COATED ORAL at 21:25

## 2018-10-25 RX ADMIN — SACUBITRIL AND VALSARTAN SCH TAB: 24; 26 TABLET, FILM COATED ORAL at 21:26

## 2018-10-25 RX ADMIN — CEFTRIAXONE SODIUM SCH MLS/HR: 2 INJECTION, POWDER, FOR SOLUTION INTRAMUSCULAR; INTRAVENOUS at 10:17

## 2018-10-25 RX ADMIN — SACUBITRIL AND VALSARTAN SCH TAB: 24; 26 TABLET, FILM COATED ORAL at 10:19

## 2018-10-25 RX ADMIN — AMIODARONE HYDROCHLORIDE SCH MG: 200 TABLET ORAL at 21:25

## 2018-10-25 RX ADMIN — IPRATROPIUM BROMIDE SCH AMP: 0.5 SOLUTION RESPIRATORY (INHALATION) at 08:05

## 2018-10-25 RX ADMIN — LORATADINE SCH MG: 10 TABLET ORAL at 10:18

## 2018-10-25 RX ADMIN — AMIODARONE HYDROCHLORIDE SCH MG: 200 TABLET ORAL at 13:39

## 2018-10-25 RX ADMIN — IPRATROPIUM BROMIDE SCH AMP: 0.5 SOLUTION RESPIRATORY (INHALATION) at 21:06

## 2018-10-25 RX ADMIN — CARVEDILOL SCH MG: 12.5 TABLET, FILM COATED ORAL at 10:18

## 2018-10-25 RX ADMIN — IPRATROPIUM BROMIDE SCH AMP: 0.5 SOLUTION RESPIRATORY (INHALATION) at 16:05

## 2018-10-25 RX ADMIN — APIXABAN SCH MG: 5 TABLET, FILM COATED ORAL at 10:18

## 2018-10-25 RX ADMIN — IPRATROPIUM BROMIDE SCH: 0.5 SOLUTION RESPIRATORY (INHALATION) at 11:10

## 2018-10-25 RX ADMIN — APIXABAN SCH MG: 5 TABLET, FILM COATED ORAL at 21:25

## 2018-10-25 NOTE — PN
Progress Note, Physician


History of Present Illness: 


Afib/Aflutter with improved rate-control on Cardizem gtt, reports improvement 

in dyspnea, light-headedness, palpitations and chest tightness. 








- Current Medication List


Current Medications: 


Active Medications





Apixaban (Eliquis -)  5 mg PO BID [facility]


   Last Admin: 10/24/18 21:52 Dose:  5 mg


Carvedilol (Coreg -)  12.5 mg PO BID [facility]


   Last Admin: 10/24/18 21:52 Dose:  12.5 mg


Ceftriaxone Sodium 2 gm/ (Dextrose)  100 mls @ 100 mls/hr IVPB DAILY [facility]; 

Protocol


   Last Admin: 10/24/18 09:54 Dose:  100 mls/hr


Diltiazem HCl 125 mg/ Sodium (Chloride)  125 mls @ 5 mls/hr IVPB TITR [facility]; 

Protocol


   Last Admin: 10/24/18 19:00 Dose:  10 mg/hr, 10 mls/hr


Ipratropium Bromide (Atrovent 0.02% Nebulizer -)  1 amp NEB RQID [facility]


   Last Admin: 10/25/18 08:05 Dose:  1 amp


Loratadine (Claritin -)  10 mg PO DAILY [facility]


   Last Admin: 10/24/18 09:52 Dose:  10 mg


Prednisone 40 mg/ Prednisone (10 mg)  50 mg PO DAILY [facility]


Sacubitril/Valsartan (Entresto 24 Mg-26 Mg Tablet)  1 tab PO BID [facility]


   Last Admin: 10/24/18 21:52 Dose:  1 tab











- Objective


Vital Signs: 


 Vital Signs











Temperature  97.8 F   10/25/18 06:00


 


Pulse Rate  91 H  10/25/18 06:00


 


Respiratory Rate  16   10/25/18 06:00


 


Blood Pressure  123/84   10/25/18 06:00


 


O2 Sat by Pulse Oximetry (%)  100   10/24/18 22:00











Constitutional: Yes: No Distress, Calm


Neck: Yes: Supple


Cardiovascular: Yes: Pulse Irregular


Respiratory: Yes: Regular, CTA Bilaterally


Gastrointestinal: Yes: Normal Bowel Sounds, Soft


Edema: No


Labs: 


 CBC, BMP





 10/25/18 05:30 





 10/25/18 05:30 





 INR, PTT











INR  1.43  (0.83-1.09)  H  10/22/18  06:00    














- ....Imaging


EKG: Report Reviewed (Tele: Rate-controlled afib)





Problem List





- Problems


(1) Asthma


Code(s): J45.909 - UNSPECIFIED ASTHMA, UNCOMPLICATED   


Qualifiers: 


   Asthma severity: mild   Asthma persistence: intermittent 





(2) Atrial flutter


Code(s): I48.92 - UNSPECIFIED ATRIAL FLUTTER   


Qualifiers: 


   Atrial flutter type: typical   Qualified Code(s): I48.3 - Typical atrial 

flutter   





(3) HTN (hypertension)


Code(s): I10 - ESSENTIAL (PRIMARY) HYPERTENSION   


Qualifiers: 


   Hypertension type: essential hypertension   Qualified Code(s): I10 - 

Essential (primary) hypertension   





(4) Pneumonia


Code(s): J18.9 - PNEUMONIA, UNSPECIFIED ORGANISM   


Qualifiers: 


   Pneumonia type: due to unspecified organism   Laterality: left   Lung 

location: upper lobe of lung   Qualified Code(s): J18.1 - Lobar pneumonia, 

unspecified organism   





Assessment/Plan


Echocardiography revealed systolic LV size dysfunction with LVEF 30-35%, LAE, 

mild MR, mild to moderate TR with RVSP 37 mmHg





1. Dyspnea referable to acute exacerbation of asthma with left upper lobe 

pneumonia


2. Persistent atrial flutter with RVR with prior history of paroxysmal atrial 

fibrillation on anticoagulation with DOAC's/Eliquis [id number] score of 1-2


3. Dilated probably non ischemic cardiomyopathy, possible tachycardia-induced 

myopathy


4. HTN


5. Low suspicion for TB, HIV negative





PLAN:





1. Continue cardizem gtt for rate control


2. Continue Coreg 12.5 bid and Entresto 24/26 bid with uptitration as tolerated 

and as needed


3. Continue Eliquis 5 bid


4. Plan for ERNIE guided synchronized cardioversion tomorrow given difficulty 

with rate-control


5. Empiric antibiotic coverage, bronchodilators, oral steroids and O2 as needed


6. Ischemic evaluation once clinically improved

## 2018-10-25 NOTE — PN
Progress Note (short form)





- Note


Progress Note: 


Just returning from [facility]. 


Sleepy but arousable.  


Overall cough and breathing improved.  No CP.  


No hemoptysis. 





  


 Intake & Output











 10/22/18 10/23/18 10/24/18 10/25/18





 23:59 23:59 23:59 23:59


 


Intake Total 500 400 620 270


 


Output Total 800   350


 


Balance -300 400 620 -80


 


Weight 241 lb 14.4 oz 234 lb 7 oz 239 lb 10.279 oz 235 lb 10.786 oz











 Last Vital Signs











Temp Pulse Resp BP Pulse Ox


 


 97.7 F   86   24 H  133/79   94 L


 


 10/25/18 10:00  10/25/18 10:00  10/25/18 10:00  10/25/18 10:00  10/25/18 09:00








Active Medications





Amiodarone HCl (Cordarone -)  200 mg PO BID [facility]


   Last Admin: 10/25/18 13:39 Dose:  200 mg


Apixaban (Eliquis -)  5 mg PO BID [facility]


   Last Admin: 10/25/18 10:18 Dose:  5 mg


Carvedilol (Coreg -)  12.5 mg PO BID [facility]


   Last Admin: 10/25/18 10:18 Dose:  12.5 mg


Ceftriaxone Sodium 2 gm/ (Dextrose)  100 mls @ 100 mls/hr IVPB DAILY [facility]; 

Protocol


   Last Admin: 10/25/18 10:17 Dose:  100 mls/hr


Ipratropium Bromide (Atrovent 0.02% Nebulizer -)  1 amp NEB RQID [facility]


   Last Admin: 10/25/18 11:10 Dose:  Not Given


Loratadine (Claritin -)  10 mg PO DAILY [facility]


   Last Admin: 10/25/18 10:18 Dose:  10 mg


Prednisone 40 mg/ Prednisone (10 mg)  50 mg PO DAILY [facility]


   Last Admin: 10/25/18 10:18 Dose:  50 mg


Sacubitril/Valsartan (Entresto 24 Mg-26 Mg Tablet)  1 tab PO BID [facility]


   Last Admin: 10/25/18 10:19 Dose:  1 tab











Constitutional: Yes: No Distress


Neck: Yes: Supple


Cardiovascular: Yes: S1S2, AFlutter  


Respiratory: Yes: few scattered rhonchi on the left 


Gastrointestinal: Yes: Normal Bowel Sounds, Soft


Edema: No


Labs: 


  


 Laboratory Results - last 24 hr











  10/21/18 10/25/18 10/25/18





  09:40 05:30 05:30


 


WBC   8.6 


 


RBC   4.87 


 


Hgb   14.9 


 


Hct   45.1 


 


MCV   92.7 


 


MCH   30.6 


 


MCHC   33.1 


 


RDW   13.0 


 


Plt Count   333  D 


 


MPV   7.6 


 


Absolute Neuts (auto)   5.5 


 


Neutrophils %   63.3 


 


Lymphocytes %   22.1  D 


 


Monocytes %   13.0 H 


 


Eosinophils %   0.5  D 


 


Basophils %   1.1  D 


 


Nucleated RBC %   0 


 


Sodium    141


 


Potassium    3.9


 


Chloride    104


 


Carbon Dioxide    28


 


Anion Gap    10


 


BUN    15


 


Creatinine    0.7


 


Creat Clearance w eGFR    > 60


 


Random Glucose    82


 


Calcium    8.6


 


Phosphorus    3.6


 


Magnesium    2.5 H


 


Total Bilirubin    0.3


 


AST    22


 


ALT    36


 


Alkaline Phosphatase    53


 


Total Protein    5.9 L


 


Albumin    3.0 L


 


TB Test (QFT) Nil  0.05  


 


TB Test (QFT) Mitogen  >10.0  


 


TB Test (QFT)  Negative  


 


TB Positive Criteria    


 


TB Test (QFT) Interp  No Result Required.  

















 Problem List 





- Problems


(1) A-fib


Code(s): I48.91 - UNSPECIFIED ATRIAL FIBRILLATION   





(2) Atrial fibrillation


Code(s): I48.91 - UNSPECIFIED ATRIAL FIBRILLATION   


Qualifiers: 


   Atrial fibrillation type: paroxysmal   Qualified Code(s): I48.0 - Paroxysmal 

atrial fibrillation   





(3) Atrial flutter


Code(s): I48.92 - UNSPECIFIED ATRIAL FLUTTER   


Qualifiers: 


   Atrial flutter type: typical   Qualified Code(s): I48.3 - Typical atrial 

flutter   





(4) HTN (hypertension)


Code(s): I10 - ESSENTIAL (PRIMARY) HYPERTENSION   


Qualifiers: 


   Hypertension type: essential hypertension   Qualified Code(s): I10 - 

Essential (primary) hypertension   





(5) Pneumonia


Code(s): J18.9 - PNEUMONIA, UNSPECIFIED ORGANISM   


Qualifiers: 


   Pneumonia type: due to unspecified organism   Laterality: left   Lung 

location: upper lobe of lung   Qualified Code(s): J18.1 - Lobar pneumonia, 

unspecified organism   





(6) Asthma


Code(s): J45.909 - UNSPECIFIED ASTHMA, UNCOMPLICATED   








IMP DYSPNEA


      MELISSA PNEUMONIA


      AFLUTTER


      ASTHMA


      HTN








PLAN  IV ABX PER ID 


        FOLLOW CULTURES


        INHALED BRONCHODILATORS


        O2


        RATE CONTROL AS PER CARDIOLOGY


        AC


        F/U CHEST CT 6 WKS TO CONFIRM RESOLUTION ON INFILTRATES


        





DR JIMENES

## 2018-10-25 NOTE — PN
Progress Note (short form)





- Note


Progress Note: 





s/p cardioversion this am


alert








 Vital Signs











 Period  Temp  Pulse  Resp  BP Sys/Tobias  Pulse Ox


 


 Last 24 Hr  97.7 F-98.5 F    16-24  /70-97  








cor-rrr


lungs scattered wheeze


abd soft,nt


ext no edema





 CBC, BMP





 10/25/18 05:30 





 10/25/18 05:30 





 Microbiology





10/21/18 07:14   Blood - Peripheral Venous   Blood Culture - Preliminary


                            NO GROWTH OBTAINED AFTER 96 HOURS, INCUBATION TO 

CONTINUE


                            FOR 1 DAYS.


10/21/18 07:14   Blood - Peripheral Venous   Blood Culture - Preliminary


                            NO GROWTH OBTAINED AFTER 96 HOURS, INCUBATION TO 

CONTINUE


                            FOR 1 DAYS.


10/23/18 07:00   Sputum - Expectorated   AFB Smear Concentration - Final


10/23/18 07:00   Sputum - Expectorated   Direct Acid Fast Bacilli Smear - Final


10/23/18 07:00   Sputum - Expectorated   Mycobacterial Culture - Preliminary


10/22/18 15:20   Sputum - Expectorated   AFB Smear Concentration - Final


10/22/18 15:20   Sputum - Expectorated   Direct Acid Fast Bacilli Smear - Final


10/22/18 15:20   Sputum - Expectorated   Mycobacterial Culture - Preliminary


10/22/18 21:45   Sputum - Expectorated   AFB Smear Concentration - Final


10/22/18 21:45   Sputum - Expectorated   Direct Acid Fast Bacilli Smear - Final


10/22/18 21:45   Sputum - Expectorated   Mycobacterial Culture - Preliminary


10/21/18 18:32   Sputum - Expectorated   Gram Stain - Final


10/21/18 18:32   Sputum - Expectorated   Sputum Culture - Final


                            NORMAL RESPIRATORY YOLANDA


10/21/18 18:50   Urine - Urine Clean Catch   Legionella Antigen - Final


10/21/18 18:50   Urine - Urine Clean Catch   Streptococcus pneumoniae Antigen (

M - Final


10/21/18 00:11   Urine - Urine Clean Catch   Urine Culture - Final


10/21/18 00:09   Nasopharyngeal Swab   Influenza Types A,B Antigen - Final


10/21/18 00:09   Nasopharyngeal Swab    - Final











  


sputum afb negative times 3,(smear) quantiferon negative








 Current Medications





Amiodarone HCl (Cordarone -)  200 mg PO BID [facility]


   Last Admin: 10/25/18 13:39 Dose:  200 mg


Apixaban (Eliquis -)  5 mg PO BID [facility]


   Last Admin: 10/25/18 10:18 Dose:  5 mg


Carvedilol (Coreg -)  12.5 mg PO BID [facility]


   Last Admin: 10/25/18 10:18 Dose:  12.5 mg


Ceftriaxone Sodium 2 gm/ (Dextrose)  100 mls @ 100 mls/hr IVPB DAILY [facility]; 

Protocol


   Last Admin: 10/25/18 10:17 Dose:  100 mls/hr


Ipratropium Bromide (Atrovent 0.02% Nebulizer -)  1 amp NEB RQID [facility]


   Last Admin: 10/25/18 11:10 Dose:  Not Given


Loratadine (Claritin -)  10 mg PO DAILY [facility]


   Last Admin: 10/25/18 10:18 Dose:  10 mg


Prednisone 40 mg/ Prednisone (10 mg)  50 mg PO DAILY [facility]


   Last Admin: 10/25/18 10:18 Dose:  50 mg


Sacubitril/Valsartan (Entresto 24 Mg-26 Mg Tablet)  1 tab PO BID [facility]


   Last Admin: 10/25/18 10:19 Dose:  1 tab











a/p


RUL pneumonia





continue rocephin for pneumonia


repeat cxray in am


f/u chest ct





atrial flutter- s/p cardioversion





























Problem List





- Problems


(1) Pneumonia


Code(s): J18.9 - PNEUMONIA, UNSPECIFIED ORGANISM   


Qualifiers: 


   Pneumonia type: due to unspecified organism   Laterality: left   Lung 

location: upper lobe of lung   Qualified Code(s): J18.1 - Lobar pneumonia, 

unspecified organism   





(2) A-fib


Code(s): I48.91 - UNSPECIFIED ATRIAL FIBRILLATION

## 2018-10-25 NOTE — PN
Physical Exam: 


SUBJECTIVE: Patient seen and examined this morning at bedside. Breathing 

continues to improve. No new complaints. 








OBJECTIVE:





 Vital Signs











 Period  Temp  Pulse  Resp  BP Sys/Tobias  Pulse Ox


 


 Last 24 Hr  97.8 F-98.5 F    16-23  /70-98  100











GENERAL: A&Ox3, NAD


HEAD: NCAT


EYES: PERRL, EOMI


EARS, NOSE, THROAT: Oropharynx clear without exudates. Moist mucous membranes.


NECK: No JVD


LUNGS: Air entry improving. Wheezes improving


HEART: Regular rate and rhythm, normal S1 and S2 without murmur


ABDOMEN: Soft, nontender, not distended, + bowel sounds, no guarding


EXTREMITIES: 2+ pulses, No edema.


NEUROLOGICAL:  Cranial nerves II-XII intact. Normal speech. C5-T1 and L4-S1 

gross sensation intact. 5/5 Muscle strength to Handgrip, elbow flexion/extension

, Hip flexion, Dorsiflexion, plantarflexion.


SKIN: Warm, dry, no rashes or lesions noted








 Laboratory Results - last 24 hr











  10/21/18 10/25/18 10/25/18





  09:40 05:30 05:30


 


WBC   8.6 


 


RBC   4.87 


 


Hgb   14.9 


 


Hct   45.1 


 


MCV   92.7 


 


MCH   30.6 


 


MCHC   33.1 


 


RDW   13.0 


 


Plt Count   333  D 


 


MPV   7.6 


 


Absolute Neuts (auto)   5.5 


 


Neutrophils %   63.3 


 


Lymphocytes %   22.1  D 


 


Monocytes %   13.0 H 


 


Eosinophils %   0.5  D 


 


Basophils %   1.1  D 


 


Nucleated RBC %   0 


 


Sodium    141


 


Potassium    3.9


 


Chloride    104


 


Carbon Dioxide    28


 


Anion Gap    10


 


BUN    15


 


Creatinine    0.7


 


Creat Clearance w eGFR    > 60


 


Random Glucose    82


 


Calcium    8.6


 


Phosphorus    3.6


 


Magnesium    2.5 H


 


Total Bilirubin    0.3


 


AST    22


 


ALT    36


 


Alkaline Phosphatase    53


 


Total Protein    5.9 L


 


Albumin    3.0 L


 


TB Test (QFT) Nil  0.05  


 


TB Test (QFT) Mitogen  >10.0  


 


TB Test (QFT)  Negative  


 


TB Positive Criteria    


 


TB Test (QFT) Interp  No Result Required.  











 Microbiology





10/21/18 07:14   Blood - Peripheral Venous   Blood Culture - Preliminary


                            NO GROWTH OBTAINED AFTER 96 HOURS, INCUBATION TO 

CONTINUE


                            FOR 1 DAYS.


10/21/18 07:14   Blood - Peripheral Venous   Blood Culture - Preliminary


                            NO GROWTH OBTAINED AFTER 96 HOURS, INCUBATION TO 

CONTINUE


                            FOR 1 DAYS.


10/23/18 07:00   Sputum - Expectorated   AFB Smear Concentration - Final


10/23/18 07:00   Sputum - Expectorated   Direct Acid Fast Bacilli Smear - Final


10/23/18 07:00   Sputum - Expectorated   Mycobacterial Culture - Preliminary


10/22/18 15:20   Sputum - Expectorated   AFB Smear Concentration - Final


10/22/18 15:20   Sputum - Expectorated   Direct Acid Fast Bacilli Smear - Final


10/22/18 15:20   Sputum - Expectorated   Mycobacterial Culture - Preliminary


10/22/18 21:45   Sputum - Expectorated   AFB Smear Concentration - Final


10/22/18 21:45   Sputum - Expectorated   Direct Acid Fast Bacilli Smear - Final


10/22/18 21:45   Sputum - Expectorated   Mycobacterial Culture - Preliminary


10/21/18 18:32   Sputum - Expectorated   Gram Stain - Final


10/21/18 18:32   Sputum - Expectorated   Sputum Culture - Final


                            NORMAL RESPIRATORY YOLANDA


10/21/18 18:50   Urine - Urine Clean Catch   Legionella Antigen - Final


10/21/18 18:50   Urine - Urine Clean Catch   Streptococcus pneumoniae Antigen (

M - Final


10/21/18 00:11   Urine - Urine Clean Catch   Urine Culture - Final


10/21/18 00:09   Nasopharyngeal Swab   Influenza Types A,B Antigen - Final


10/21/18 00:09   Nasopharyngeal Swab    - Final








Active Medications





Apixaban (Eliquis -)  5 mg PO BID [facility]


   Last Admin: 10/25/18 10:18 Dose:  5 mg


Carvedilol (Coreg -)  12.5 mg PO BID [facility]


   Last Admin: 10/25/18 10:18 Dose:  12.5 mg


Ceftriaxone Sodium 2 gm/ (Dextrose)  100 mls @ 100 mls/hr IVPB DAILY [facility]; 

Protocol


   Last Admin: 10/25/18 10:17 Dose:  100 mls/hr


Diltiazem HCl 125 mg/ Sodium (Chloride)  125 mls @ 5 mls/hr IVPB TITR [facility]; 

Protocol


   Last Admin: 10/24/18 19:00 Dose:  10 mg/hr, 10 mls/hr


Ipratropium Bromide (Atrovent 0.02% Nebulizer -)  1 amp NEB RQID [facility]


   Last Admin: 10/25/18 11:10 Dose:  Not Given


Loratadine (Claritin -)  10 mg PO DAILY [facility]


   Last Admin: 10/25/18 10:18 Dose:  10 mg


Prednisone 40 mg/ Prednisone (10 mg)  50 mg PO DAILY [facility]


   Last Admin: 10/25/18 10:18 Dose:  50 mg


Sacubitril/Valsartan (Entresto 24 Mg-26 Mg Tablet)  1 tab PO BID [facility]


   Last Admin: 10/25/18 10:19 Dose:  1 tab








IMAGING:


-EKG: SINUS TACHYCARDIA, INFERIOR INFARCT , AGE UNDETERMINED, ANTERIOR INFARCT (

CITED ON OR BEFORE 21-OCT-2018),  bpm, QTc 401


-ECHO: LV is mildly dilated, LV SF is severely reduced, Global hypokinesis of 

the LV, EF 30-35%, Reduced RV Systolic function, LA and RA are moderately 

dilated, Mild MR, Mild to Mod TR


-CXR: Left upper lobe presumed pneumonia. If findings do not resolve then 

further imaging with CT is suggested. 


-CT Chest without contrast: Extensive left upper lobe consolidation consistent 

with an acute pneumonia. Clinical correlation and follow-up recommended.  








ASSESSMENT/PLAN:


52 y/o M with PMHx of asthma, Afib (not on anticoagulation), HTN, presents with 

SOB for the past three weeks. 





1. Atrial Flutter with RVR


-Cardiology (Dr. Ricardo) consulted, appreciate rec's


-DC synchronized cardioversion (10/25), Successful synchronized cardioversion 

to sinus rhythm with 120J


-ERNIE report pending


-Continue Cardizen GTT


-Continue Carvedilol, Entresto


-Continue Eliquis 5 mg BID


-Troponin < 0.02 x2


-BNP 2442.2 


-EKG: Sinus tachy


-ECHO: LV SF is severely reduced, Global hypokinesis of the LV, EF 30-35%


-Strict I&Os, Daily weights


-Will likely need Ischemic work up once HR improved and infection resolved





2. MELISSA pneumonia


-D/C Isolation


-TB Quantiferon negative, AFB Gram stain negative


-Ceftriaxone 2 gram IV daily (Started on 10/21)


-Continue Ventolin, Symbicort, Prednisone


-Completed Azithromycin course (10/21-10/22)


-Urine for pneumonia, legionella negative


-Flu swab negative


-Blood cultures NGTD


-Sputum cultures pending


-HIV Screen negative


-ID (Dr. Shen) consulted, appreciate rec's


-Pulmonology (Dr. Benavidez) consulted, appreciate rec's, COLD AGGLUTININS, F/

U CHEST X-RAYS, F/U CHEST CT 6 WKS TO CONFIRM RESOLUTION ON INFILTRATES





3. Hyponatremia


-Resolved


-Urine electrolytes, serum and urine osmolality, urine creatinine noted





4. FEN


-PO fluids


-Continue to monitor for Hyponatremia


-Sodium controlled diet





5. PPx


-DVT: Eliquis





Dispo: [facility] tmrw








Visit type





- Emergency Visit


Emergency Visit: Yes


ED Registration Date: 10/21/18


Care time: The patient presented to the Emergency Department on the above date 

and was hospitalized for further evaluation of their emergent condition.





- New Patient


This patient is new to me today: No





- Critical Care


Critical Care patient: No





- Discharge Referral


Referred to [facility] No

## 2018-10-25 NOTE — EKG
Test Reason : 

Blood Pressure : ***/*** mmHG

Vent. Rate : 081 BPM     Atrial Rate : 081 BPM

   P-R Int : 198 ms          QRS Dur : 096 ms

    QT Int : 370 ms       P-R-T Axes : 054 004 118 degrees

   QTc Int : 429 ms

 

NORMAL SINUS RHYTHM

POSSIBLE LEFT ATRIAL ENLARGEMENT

INFERIOR INFARCT (CITED ON OR BEFORE 21-OCT-2018)

T WAVE ABNORMALITY, CONSIDER LATERAL ISCHEMIA

ABNORMAL ECG

WHEN COMPARED WITH ECG OF 21-OCT-2018 00:28,

SIGNIFICANT CHANGES HAVE OCCURRED

Confirmed by KITTY JOHN MD ([id number]) on 10/25/2018 12:07:27 PM

 

Referred By: SAULO WILCOX           Confirmed By:KITTY JOHN MD

## 2018-10-25 NOTE — PN
Progress Note (short form)





- Note


Progress Note: 





ERNIE/synchronized cardioversion





Risks and benefits discussed with patient and consent was signed


ERNIE report is to follow


1. Severe LV systolic dysfunction


2. Mild to moderate eccentric MR


3. Mild TR


4. PFO with left to right shunt via color Doppler. Bubbles pass right to left 

with valsalva


5. No pericardial effusion


6. Small atherosclerotic plaque in thoracic aorta and aortic arch





Successful synchronized cardioversion to sinus rhythm with 120J


Check 12 lead ECG


Consider adding Amiodarone for short term and consider sleep study as 

outpatient most likely has MOISES





Sam Ricardo MD





Problem List





- Problems


(1) Bronchial asthma


Code(s): J45.909 - UNSPECIFIED ASTHMA, UNCOMPLICATED   


Qualifiers: 


   Asthma severity: unspecified severity   Asthma persistence: unspecified   

Asthma complication type: unspecified   Qualified Code(s): J45.909 - 

Unspecified asthma, uncomplicated   





(2) HTN (hypertension)


Code(s): I10 - ESSENTIAL (PRIMARY) HYPERTENSION   


Qualifiers: 


   Hypertension type: essential hypertension   Qualified Code(s): I10 - 

Essential (primary) hypertension   





(3) Atrial fibrillation


Code(s): I48.91 - UNSPECIFIED ATRIAL FIBRILLATION   


Qualifiers: 


   Atrial fibrillation type: paroxysmal   Qualified Code(s): I48.0 - Paroxysmal 

atrial fibrillation   





(4) Atrial flutter


Code(s): I48.92 - UNSPECIFIED ATRIAL FLUTTER   


Qualifiers: 


   Atrial flutter type: typical   Qualified Code(s): I48.3 - Typical atrial 

flutter   





(5) Pneumonia


Code(s): J18.9 - PNEUMONIA, UNSPECIFIED ORGANISM   


Qualifiers: 


   Pneumonia type: due to unspecified organism   Laterality: left   Lung 

location: upper lobe of lung   Qualified Code(s): J18.1 - Lobar pneumonia, 

unspecified organism

## 2018-10-25 NOTE — PN
Teaching Attending Note


Name of Resident: Kwesi Schmidt





ATTENDING PHYSICIAN STATEMENT





I saw and evaluated the patient.


I reviewed the resident's note and discussed the case with the resident.


I agree with the resident's findings and plan as documented.








SUBJECTIVE:





Patient is feeling better with no acute distress s/p ERNIE today and 

cardioversion for uncontrolled afib. 


OBJECTIVE:


 Vital Signs











Temperature  98.4 F   10/25/18 14:00


 


Pulse Rate  99 H  10/25/18 16:00


 


Respiratory Rate  19   10/25/18 16:00


 


Blood Pressure  108/73   10/25/18 16:00


 


O2 Sat by Pulse Oximetry (%)  94 L  10/25/18 09:00











GENERAL: The patient is awake, alert, and fully oriented, in no acute distress.


HEAD: Normal with no signs of trauma.


EYES: PERRL, extraocular movements intact, sclera anicteric, conjunctiva clear. 

No ptosis. 


neck: soft, supple, no JVD visualized


Chest: few right basilar and left upper lobe rales, positive air entry, no 

wheezing


ENT: Ears normal, nares patent, oropharynx clear without exudates, moist mucous 

membranes.


NECK: Trachea midline, full range of motion, supple. 


CVS: S1S2 irregular


ABDOMEN: Soft, nontender, nondistended, normoactive bowel sounds, no guarding, 

no rebound


EXTREMITIES: 2+ pulses, warm, well-perfused, no edema. 


NEUROLOGICAL: Cranial nerves II through XII grossly intact. Normal speech, 

ambulating well in the room


PSYCH: Normal mood, normal affect.


SKIN: Warm, dry, normal turgor, no rashes or lesions noted


 CBCD











WBC  8.6 K/mm3 (4.0-10.0)   10/25/18  05:30    


 


RBC  4.87 M/mm3 (4.00-5.60)   10/25/18  05:30    


 


Hgb  14.9 GM/dL (11.7-16.9)   10/25/18  05:30    


 


Hct  45.1 % (35.4-49)   10/25/18  05:30    


 


MCV  92.7 fl (80-96)   10/25/18  05:30    


 


MCHC  33.1 g/dl (32.0-35.9)   10/25/18  05:30    


 


RDW  13.0 % (11.9-15.9)   10/25/18  05:30    


 


Plt Count  333 K/MM3 (134-434)  D 10/25/18  05:30    


 


MPV  7.6 fl (7.5-11.1)   10/25/18  05:30    








 CMP











Sodium  141 mmol/L (136-145)   10/25/18  05:30    


 


Potassium  3.9 mmol/L (3.5-5.1)   10/25/18  05:30    


 


Chloride  104 mmol/L ()   10/25/18  05:30    


 


Carbon Dioxide  28 mmol/L (21-32)   10/25/18  05:30    


 


Anion Gap  10 MMOL/L (8-16)   10/25/18  05:30    


 


BUN  15 mg/dL (7-18)   10/25/18  05:30    


 


Creatinine  0.7 mg/dL (0.55-1.3)   10/25/18  05:30    


 


Creat Clearance w eGFR  > 60  (>60)   10/25/18  05:30    


 


Random Glucose  82 mg/dL ()   10/25/18  05:30    


 


Calcium  8.6 mg/dL (8.5-10.1)   10/25/18  05:30    


 


Total Bilirubin  0.3 mg/dL (0.2-1)   10/25/18  05:30    


 


AST  22 U/L (15-37)   10/25/18  05:30    


 


ALT  36 U/L (13-61)   10/25/18  05:30    


 


Alkaline Phosphatase  53 U/L ()   10/25/18  05:30    


 


Total Protein  5.9 g/dl (6.4-8.2)  L  10/25/18  05:30    


 


Albumin  3.0 g/dl (3.4-5.0)  L  10/25/18  05:30    








 CARDIAC ENZYMES











Troponin I  < 0.02 ng/ml (0.00-0.05)   10/21/18  09:43    








 Home Medications











 Medication  Instructions  Recorded


 


Albuterol Sulfate Inhaler - 1 - 2 inh PO QID PRN 10/13/14





[Ventolin HFA Inhaler -]  


 


Budesonide/Formeterol Fumarate 1 inh PO DAILY 11/20/17





[SYMBICORT 160/4.5mcg -]  


 


Diltiazem Cd [Cardizem Cd -] 300 mg PO DAILY 11/20/17


 


Loratadine [Claritin] 10 mg PO DAILY 10/21/18


 


Valsartan 40 mg PO DAILY 10/21/18








 


  Current Medications











Generic Name Dose Route Start Last Admin





  Trade Name Braulioq  PRN Reason Stop Dose Admin


 


Amiodarone HCl  200 mg  10/25/18 13:00  10/25/18 21:25





  Cordarone -  PO   200 mg





  BID RAEGAN   Administration





     





     





     





     


 


Apixaban  5 mg  10/21/18 13:45  10/25/18 21:25





  Eliquis -  PO   5 mg





  BID RAEGAN   Administration





     





     





     





     


 


Carvedilol  12.5 mg  10/23/18 10:00  10/25/18 21:25





  Coreg -  PO   12.5 mg





  BID RAEGAN   Administration





     





     





     





     


 


Ceftriaxone Sodium 2 gm/  100 mls @ 100 mls/hr  10/23/18 10:00  10/25/18 10:17





  Dextrose  IVPB   100 mls/hr





  DAILY RAEGAN   Administration





     





     





  Protocol   





     


 


Ipratropium Bromide  1 amp  10/24/18 16:00  10/25/18 21:06





  Atrovent 0.02% Nebulizer -  NEB   1 amp





  RQID RAEGAN   Administration





     





     





     





     


 


Loratadine  10 mg  10/21/18 10:00  10/25/18 10:18





  Claritin -  PO   10 mg





  DAILY RAEGAN   Administration





     





     





     





     


 


Prednisone 40 mg/ Prednisone  50 mg  10/25/18 10:00  10/25/18 10:18





  10 mg  PO   50 mg





  DAILY RAEGAN   Administration





     





     





     





     


 


Sacubitril/Valsartan  1 tab  10/23/18 10:00  10/25/18 21:26





  Entresto 24 Mg-26 Mg Tablet  PO   1 tab





  BID RAEGAN   Administration





     





     





     





     








Microbiology





10/21/18 07:14   Blood - Peripheral Venous   Blood Culture - Preliminary


                            NO GROWTH OBTAINED AFTER 96 HOURS, INCUBATION TO 

CONTINUE


                            FOR 1 DAYS.


10/21/18 07:14   Blood - Peripheral Venous   Blood Culture - Preliminary


                            NO GROWTH OBTAINED AFTER 96 HOURS, INCUBATION TO 

CONTINUE


                            FOR 1 DAYS.


10/23/18 07:00   Sputum - Expectorated   AFB Smear Concentration - Final


10/23/18 07:00   Sputum - Expectorated   Direct Acid Fast Bacilli Smear - Final


10/23/18 07:00   Sputum - Expectorated   Mycobacterial Culture - Preliminary


10/22/18 15:20   Sputum - Expectorated   AFB Smear Concentration - Final


10/22/18 15:20   Sputum - Expectorated   Direct Acid Fast Bacilli Smear - Final


10/22/18 15:20   Sputum - Expectorated   Mycobacterial Culture - Preliminary


10/22/18 21:45   Sputum - Expectorated   AFB Smear Concentration - Final


10/22/18 21:45   Sputum - Expectorated   Direct Acid Fast Bacilli Smear - Final


10/22/18 21:45   Sputum - Expectorated   Mycobacterial Culture - Preliminary


10/21/18 18:32   Sputum - Expectorated   Gram Stain - Final


10/21/18 18:32   Sputum - Expectorated   Sputum Culture - Final


                            NORMAL RESPIRATORY YOLANDA


10/21/18 18:50   Urine - Urine Clean Catch   Legionella Antigen - Final


10/21/18 18:50   Urine - Urine Clean Catch   Streptococcus pneumoniae Antigen (

M - Final


10/21/18 00:11   Urine - Urine Clean Catch   Urine Culture - Final


10/21/18 00:09   Nasopharyngeal Swab   Influenza Types A,B Antigen - Final


10/21/18 00:09   Nasopharyngeal Swab    - Final








ECHO 10/22/2018: left ventricle dilated mildly, Left ventricular systolic 

function is severely reduced, severe global hypokinesis of the left ventricle, 

EJF 30-35%,


LV function is severely reduced. left atrium and right atrium is dilated 

moderately, mild to moderate MR, mild to moderate TR, pulmonary artery pressure 

is at least 37mmhg, RA pressure of 15mmHg.  No pericardial effusion. 





ASSESSMENT AND PLAN:


Patient is a 52 yo male  with PMHx of paroxysmal Afib not on AC (prior h/o 

being on coumadin), Asthma/bronchitis, admitted with MELISSA CAP and Atrial flutter 

with RVR, found with new cardiomyopathy





#MELISSA PNA with early sepsis, Ceftriaxone day 5 , id appreciated ,legionella 

ruled out, AFBx3 negative, Strept pneum.neagtive, Influenza is neg.


#Atrial flutter with RVR s/p Cardioversion and ERNIE, on Eliquis 


#Global hypokinesis of left ventricle with EJF of 30-35%, cardio is on the 

case. patient is started on Coreg, Shemar and Amiodorane (TSH 0.84) 


#Suspected COPD exacerbation/asthma  with heavy smoking history , will continue 

with Prednisone, atrovent nebs, 


#HTN on Shemar, Coreg.


DVT PX: Eliquis

## 2018-10-26 LAB
ALBUMIN SERPL-MCNC: 3 G/DL (ref 3.4–5)
ALP SERPL-CCNC: 51 U/L (ref 45–117)
ALT SERPL-CCNC: 35 U/L (ref 13–61)
ANION GAP SERPL CALC-SCNC: 10 MMOL/L (ref 8–16)
AST SERPL-CCNC: 14 U/L (ref 15–37)
BASOPHILS # BLD: 0.7 % (ref 0–2)
BILIRUB SERPL-MCNC: 0.4 MG/DL (ref 0.2–1)
BUN SERPL-MCNC: 15 MG/DL (ref 7–18)
CALCIUM SERPL-MCNC: 8.2 MG/DL (ref 8.5–10.1)
CHLORIDE SERPL-SCNC: 103 MMOL/L (ref 98–107)
CO2 SERPL-SCNC: 29 MMOL/L (ref 21–32)
CREAT SERPL-MCNC: 0.8 MG/DL (ref 0.55–1.3)
DEPRECATED RDW RBC AUTO: 12.8 % (ref 11.9–15.9)
EOSINOPHIL # BLD: 0.3 % (ref 0–4.5)
GLUCOSE SERPL-MCNC: 81 MG/DL (ref 74–106)
HCT VFR BLD CALC: 43.3 % (ref 35.4–49)
HGB BLD-MCNC: 14.4 GM/DL (ref 11.7–16.9)
LYMPHOCYTES # BLD: 19.4 % (ref 8–40)
MAGNESIUM SERPL-MCNC: 2.3 MG/DL (ref 1.8–2.4)
MCH RBC QN AUTO: 30.8 PG (ref 25.7–33.7)
MCHC RBC AUTO-ENTMCNC: 33.2 G/DL (ref 32–35.9)
MCV RBC: 92.7 FL (ref 80–96)
MONOCYTES # BLD AUTO: 11.2 % (ref 3.8–10.2)
NEUTROPHILS # BLD: 68.4 % (ref 42.8–82.8)
PHOSPHATE SERPL-MCNC: 3.5 MG/DL (ref 2.5–4.9)
PLATELET # BLD AUTO: 324 K/MM3 (ref 134–434)
PMV BLD: 7.1 FL (ref 7.5–11.1)
POTASSIUM SERPLBLD-SCNC: 4.3 MMOL/L (ref 3.5–5.1)
PROT SERPL-MCNC: 6 G/DL (ref 6.4–8.2)
RBC # BLD AUTO: 4.67 M/MM3 (ref 4–5.6)
SODIUM SERPL-SCNC: 141 MMOL/L (ref 136–145)
WBC # BLD AUTO: 8.8 K/MM3 (ref 4–10)

## 2018-10-26 RX ADMIN — IPRATROPIUM BROMIDE SCH AMP: 0.5 SOLUTION RESPIRATORY (INHALATION) at 08:36

## 2018-10-26 RX ADMIN — LORATADINE SCH MG: 10 TABLET ORAL at 10:19

## 2018-10-26 RX ADMIN — IPRATROPIUM BROMIDE SCH AMP: 0.5 SOLUTION RESPIRATORY (INHALATION) at 22:27

## 2018-10-26 RX ADMIN — AMIODARONE HYDROCHLORIDE SCH MG: 200 TABLET ORAL at 10:19

## 2018-10-26 RX ADMIN — APIXABAN SCH MG: 5 TABLET, FILM COATED ORAL at 22:06

## 2018-10-26 RX ADMIN — APIXABAN SCH MG: 5 TABLET, FILM COATED ORAL at 10:19

## 2018-10-26 RX ADMIN — SACUBITRIL AND VALSARTAN SCH TAB: 24; 26 TABLET, FILM COATED ORAL at 22:07

## 2018-10-26 RX ADMIN — CEFTRIAXONE SODIUM SCH MLS/HR: 2 INJECTION, POWDER, FOR SOLUTION INTRAMUSCULAR; INTRAVENOUS at 10:20

## 2018-10-26 RX ADMIN — IPRATROPIUM BROMIDE SCH AMP: 0.5 SOLUTION RESPIRATORY (INHALATION) at 12:29

## 2018-10-26 RX ADMIN — SACUBITRIL AND VALSARTAN SCH TAB: 24; 26 TABLET, FILM COATED ORAL at 10:20

## 2018-10-26 RX ADMIN — CARVEDILOL SCH MG: 25 TABLET, FILM COATED ORAL at 22:06

## 2018-10-26 RX ADMIN — CARVEDILOL SCH MG: 12.5 TABLET, FILM COATED ORAL at 10:19

## 2018-10-26 RX ADMIN — IPRATROPIUM BROMIDE SCH AMP: 0.5 SOLUTION RESPIRATORY (INHALATION) at 16:33

## 2018-10-26 RX ADMIN — AMIODARONE HYDROCHLORIDE SCH MG: 200 TABLET ORAL at 22:06

## 2018-10-26 NOTE — PN
Teaching Attending Note


Name of Resident: Valentine Gee





ATTENDING PHYSICIAN STATEMENT





I saw and evaluated the patient.


I reviewed the resident's note and discussed the case with the resident.


I agree with the resident's findings and plan as documented.








SUBJECTIVE:


Patient is comfortable with no acute distress, HR is better controlled now. s/p 

cardioversion.





OBJECTIVE:


 Vital Signs











Temperature  98 F   10/26/18 06:20


 


Pulse Rate  101 H  10/26/18 06:20


 


Respiratory Rate  20   10/26/18 06:20


 


Blood Pressure  136/104 H  10/26/18 06:20


 


O2 Sat by Pulse Oximetry (%)  94 L  10/25/18 20:00








GENERAL: The patient is awake, alert, and fully oriented, in no acute distress.


HEAD: Normal with no signs of trauma.


EYES: PERRL, extraocular movements intact, sclera anicteric, conjunctiva clear. 


neck: soft, supple, no JVD visualized


Chest: few right basilar and left upper lobe rales, positive air entry, no 

wheezing


ENT: Ears normal, oropharynx clear without exudates, moist mucous membranes.


NECK: Trachea midline, full range of motion, supple. 


CVS: S1S2 irregular 


ABDOMEN: Soft, nontender, nondistended, normoactive bowel sounds, no guarding, 

no rebound


EXTREMITIES: 2+ pulses, warm, well-perfused, no edema. 


NEUROLOGICAL: Cranial nerves II through XII grossly intact. Normal speech, 

ambulating well in the room


PSYCH: Normal mood, normal affect.


SKIN: Warm, dry, normal turgor, no rashes or lesions noted





 CBCD











WBC  8.8 K/mm3 (4.0-10.0)   10/26/18  05:30    


 


RBC  4.67 M/mm3 (4.00-5.60)   10/26/18  05:30    


 


Hgb  14.4 GM/dL (11.7-16.9)   10/26/18  05:30    


 


Hct  43.3 % (35.4-49)   10/26/18  05:30    


 


MCV  92.7 fl (80-96)   10/26/18  05:30    


 


MCHC  33.2 g/dl (32.0-35.9)   10/26/18  05:30    


 


RDW  12.8 % (11.9-15.9)   10/26/18  05:30    


 


Plt Count  324 K/MM3 (134-434)   10/26/18  05:30    


 


MPV  7.1 fl (7.5-11.1)  L  10/26/18  05:30    








 CMP











Sodium  141 mmol/L (136-145)   10/26/18  05:30    


 


Potassium  4.3 mmol/L (3.5-5.1)   10/26/18  05:30    


 


Chloride  103 mmol/L ()   10/26/18  05:30    


 


Carbon Dioxide  29 mmol/L (21-32)   10/26/18  05:30    


 


Anion Gap  10 MMOL/L (8-16)   10/26/18  05:30    


 


BUN  15 mg/dL (7-18)   10/26/18  05:30    


 


Creatinine  0.8 mg/dL (0.55-1.3)   10/26/18  05:30    


 


Creat Clearance w eGFR  > 60  (>60)   10/26/18  05:30    


 


Random Glucose  81 mg/dL ()   10/26/18  05:30    


 


Calcium  8.2 mg/dL (8.5-10.1)  L  10/26/18  05:30    


 


Total Bilirubin  0.4 mg/dL (0.2-1)   10/26/18  05:30    


 


AST  14 U/L (15-37)  L  10/26/18  05:30    


 


ALT  35 U/L (13-61)   10/26/18  05:30    


 


Alkaline Phosphatase  51 U/L ()   10/26/18  05:30    


 


Total Protein  6.0 g/dl (6.4-8.2)  L  10/26/18  05:30    


 


Albumin  3.0 g/dl (3.4-5.0)  L  10/26/18  05:30    








 CARDIAC ENZYMES











Troponin I  < 0.02 ng/ml (0.00-0.05)   10/21/18  09:43    








 Current Medications











Generic Name Dose Route Start Last Admin





  Trade Name Freq  PRN Reason Stop Dose Admin


 


Amiodarone HCl  200 mg  10/25/18 13:00  10/25/18 21:25





  Cordarone -  PO   200 mg





  BID RAEGAN   Administration





     





     





     





     


 


Apixaban  5 mg  10/21/18 13:45  10/25/18 21:25





  Eliquis -  PO   5 mg





  BID RAEGAN   Administration





     





     





     





     


 


Carvedilol  12.5 mg  10/23/18 10:00  10/25/18 21:25





  Coreg -  PO   12.5 mg





  BID RAEGAN   Administration





     





     





     





     


 


Ceftriaxone Sodium 2 gm/  100 mls @ 100 mls/hr  10/23/18 10:00  10/25/18 10:17





  Dextrose  IVPB   100 mls/hr





  DAILY RAEGAN   Administration





     





     





  Protocol   





     


 


Ipratropium Bromide  1 amp  10/24/18 16:00  10/26/18 08:36





  Atrovent 0.02% Nebulizer -  NEB   1 amp





  RQID RAEGAN   Administration





     





     





     





     


 


Loratadine  10 mg  10/21/18 10:00  10/25/18 10:18





  Claritin -  PO   10 mg





  DAILY RAEGAN   Administration





     





     





     





     


 


Prednisone 40 mg/ Prednisone  50 mg  10/25/18 10:00  10/25/18 10:18





  10 mg  PO   50 mg





  DAILY RAEGAN   Administration





     





     





     





     


 


Sacubitril/Valsartan  1 tab  10/23/18 10:00  10/25/18 21:26





  Entresto 24 Mg-26 Mg Tablet  PO   1 tab





  BID RAEGAN   Administration





     





     





     





     








 Home Medications











 Medication  Instructions  Recorded


 


Albuterol Sulfate Inhaler - 1 - 2 inh PO QID PRN 10/13/14





[Ventolin HFA Inhaler -]  


 


Budesonide/Formeterol Fumarate 1 inh PO DAILY 11/20/17





[SYMBICORT 160/4.5mcg -]  


 


Diltiazem Cd [Cardizem Cd -] 300 mg PO DAILY 11/20/17


 


Loratadine [Claritin] 10 mg PO DAILY 10/21/18


 


Valsartan 40 mg PO DAILY 10/21/18











 Microbiology





10/21/18 07:14   Blood - Peripheral Venous   Blood Culture - Preliminary


                            NO GROWTH OBTAINED AFTER 96 HOURS, INCUBATION TO 

CONTINUE


                            FOR 1 DAYS.


10/21/18 07:14   Blood - Peripheral Venous   Blood Culture - Preliminary


                            NO GROWTH OBTAINED AFTER 96 HOURS, INCUBATION TO 

CONTINUE


                            FOR 1 DAYS.


10/23/18 07:00   Sputum - Expectorated   AFB Smear Concentration - Final


10/23/18 07:00   Sputum - Expectorated   Direct Acid Fast Bacilli Smear - Final


10/23/18 07:00   Sputum - Expectorated   Mycobacterial Culture - Preliminary


10/22/18 15:20   Sputum - Expectorated   AFB Smear Concentration - Final


10/22/18 15:20   Sputum - Expectorated   Direct Acid Fast Bacilli Smear - Final


10/22/18 15:20   Sputum - Expectorated   Mycobacterial Culture - Preliminary


10/22/18 21:45   Sputum - Expectorated   AFB Smear Concentration - Final


10/22/18 21:45   Sputum - Expectorated   Direct Acid Fast Bacilli Smear - Final


10/22/18 21:45   Sputum - Expectorated   Mycobacterial Culture - Preliminary


10/21/18 18:32   Sputum - Expectorated   Gram Stain - Final


10/21/18 18:32   Sputum - Expectorated   Sputum Culture - Final


                            NORMAL RESPIRATORY YOLANDA


10/21/18 18:50   Urine - Urine Clean Catch   Legionella Antigen - Final


10/21/18 18:50   Urine - Urine Clean Catch   Streptococcus pneumoniae Antigen (

M - Final


10/21/18 00:11   Urine - Urine Clean Catch   Urine Culture - Final


10/21/18 00:09   Nasopharyngeal Swab   Influenza Types A,B Antigen - Final


10/21/18 00:09   Nasopharyngeal Swab    - Final





ECHO 10/22/2018: left ventricle dilated mildly, Left ventricular systolic 

function is severely reduced, severe global hypokinesis of the left ventricle, 

EJF 30-35%,


LV function is severely reduced. left atrium and right atrium is dilated 

moderately, mild to moderate MR, mild to moderate TR, pulmonary artery pressure 

is at least 37mmhg, RA pressure of 15mmHg.  No pericardial effusion. 





ASSESSMENT AND PLAN:


Patient is a 52 yo male  with PMHx of paroxysmal Afib not on AC (prior h/o 

being on coumadin), Asthma/bronchitis, admitted with MELISSA CAP and Atrial flutter 

with RVR, found with new cardiomyopathy





#MELISSA PNA with  sepsis, On Ceftriaxone  , id appreciated ,legionella ruled out, 

AFBx3 negative, Strept pneum.neagtive, Influenza is neg.


#Atrial flutter with RVR s/p Cardioversion and ERNIE, on Eliquis , will continue 

to monitor the HR , on Amiodoran for now, cardio on the case


#Global hypokinesis of left ventricle with EJF of 30-35%, cardio is on the 

case. patient is started on Coreg, Shemar and Amiodorane (TSH 0.84) 


#Suspected COPD exacerbation/asthma  with heavy smoking history , will continue 

with Prednisone, atrovent nebs, 


#HTN on Shemar, Coreg.


DVT PX: Eliquis

## 2018-10-26 NOTE — PN
Progress Note (short form)





- Note


Progress Note: 





s/p cardioversion 


feels well


alert





 Vital Signs











 Period  Temp  Pulse  Resp  BP Sys/Tobias  Pulse Ox


 


 Last 24 Hr  97.6 F-98.4 F    18-24  /  94








cor-rrr


lungs clear


crackles Left upper lobe


abd soft,nt


ext no edema





 CBC, BMP





 10/26/18 05:30 





 10/26/18 05:30 





 Microbiology





10/21/18 07:14   Blood - Peripheral Venous   Blood Culture - Final


                            NO GROWTH AFTER 5 DAYS INCUBATION


10/21/18 07:14   Blood - Peripheral Venous   Blood Culture - Final


                            NO GROWTH AFTER 5 DAYS INCUBATION


10/23/18 07:00   Sputum - Expectorated   AFB Smear Concentration - Final


10/23/18 07:00   Sputum - Expectorated   Direct Acid Fast Bacilli Smear - Final


10/23/18 07:00   Sputum - Expectorated   Mycobacterial Culture - Preliminary


10/22/18 15:20   Sputum - Expectorated   AFB Smear Concentration - Final


10/22/18 15:20   Sputum - Expectorated   Direct Acid Fast Bacilli Smear - Final


10/22/18 15:20   Sputum - Expectorated   Mycobacterial Culture - Preliminary


10/22/18 21:45   Sputum - Expectorated   AFB Smear Concentration - Final


10/22/18 21:45   Sputum - Expectorated   Direct Acid Fast Bacilli Smear - Final


10/22/18 21:45   Sputum - Expectorated   Mycobacterial Culture - Preliminary


10/21/18 18:32   Sputum - Expectorated   Gram Stain - Final


10/21/18 18:32   Sputum - Expectorated   Sputum Culture - Final


                            NORMAL RESPIRATORY YOLANDA


10/21/18 18:50   Urine - Urine Clean Catch   Legionella Antigen - Final


10/21/18 18:50   Urine - Urine Clean Catch   Streptococcus pneumoniae Antigen (

M - Final


10/21/18 00:11   Urine - Urine Clean Catch   Urine Culture - Final


10/21/18 00:09   Nasopharyngeal Swab   Influenza Types A,B Antigen - Final


10/21/18 00:09   Nasopharyngeal Swab    - Final














  


sputum afb negative times 3,(smear) quantiferon negative








  Current Medications





Amiodarone HCl (Cordarone -)  200 mg PO BID [facility]


   Last Admin: 10/26/18 10:19 Dose:  200 mg


Apixaban (Eliquis -)  5 mg PO BID [facility]


   Last Admin: 10/26/18 10:19 Dose:  5 mg


Carvedilol (Coreg -)  12.5 mg PO BID [facility]


   Last Admin: 10/26/18 10:19 Dose:  12.5 mg


Ceftriaxone Sodium 2 gm/ (Dextrose)  100 mls @ 100 mls/hr IVPB DAILY [facility]; 

Protocol


   Last Admin: 10/26/18 10:20 Dose:  100 mls/hr


Ipratropium Bromide (Atrovent 0.02% Nebulizer -)  1 amp NEB RQID [facility]


   Last Admin: 10/26/18 12:29 Dose:  1 amp


Loratadine (Claritin -)  10 mg PO DAILY [facility]


   Last Admin: 10/26/18 10:19 Dose:  10 mg


Prednisone 40 mg/ Prednisone (10 mg)  50 mg PO DAILY [facility]


   Last Admin: 10/26/18 10:19 Dose:  50 mg


Sacubitril/Valsartan (Entresto 24 Mg-26 Mg Tablet)  1 tab PO BID [facility]


   Last Admin: 10/26/18 10:20 Dose:  1 tab





cxray resolving








a/p


RUL pneumoniar-resolving


day #6 of 7 ceftriaxone














atrial flutter- s/p cardioversion





please call back if needed
































Problem List





- Problems


(1) Pneumonia


Code(s): J18.9 - PNEUMONIA, UNSPECIFIED ORGANISM   


Qualifiers: 


   Pneumonia type: due to unspecified organism   Laterality: left   Lung 

location: upper lobe of lung   Qualified Code(s): J18.1 - Lobar pneumonia, 

unspecified organism   





(2) A-fib


Code(s): I48.91 - UNSPECIFIED ATRIAL FIBRILLATION

## 2018-10-26 NOTE — PN
Physical Exam: 


SUBJECTIVE: Patient seen and examined this morning at bedside. Breathing 

continues to improve. No new complaints. 





OBJECTIVE:





 Vital Signs











 Period  Temp  Pulse  Resp  BP Sys/Tobias  Pulse Ox


 


 Last 24 Hr  97.6 F-98.4 F    18-24  /  94











GENERAL: A&Ox3, NAD


HEAD: NCAT


EYES: PERRL, EOMI


EARS, NOSE, THROAT: Oropharynx clear without exudates. Moist mucous membranes.


NECK: No JVD


LUNGS: Air entry improving. Wheezes improving


HEART: Regular rate and rhythm, normal S1 and S2 without murmur


ABDOMEN: Soft, nontender, not distended, + bowel sounds, no guarding


EXTREMITIES: 2+ pulses, No edema.


NEUROLOGICAL:  Cranial nerves II-XII intact. Normal speech. C5-T1 and L4-S1 

gross sensation intact. 5/5 Muscle strength to Handgrip, elbow flexion/extension

, Hip flexion, Dorsiflexion, plantarflexion.


SKIN: Warm, dry, no rashes or lesions noted








 Laboratory Results - last 24 hr











  10/26/18 10/26/18





  05:30 05:30


 


WBC  8.8 


 


RBC  4.67 


 


Hgb  14.4 


 


Hct  43.3 


 


MCV  92.7 


 


MCH  30.8 


 


MCHC  33.2 


 


RDW  12.8 


 


Plt Count  324 


 


MPV  7.1 L 


 


Absolute Neuts (auto)  6.0 


 


Neutrophils %  68.4 


 


Lymphocytes %  19.4 


 


Monocytes %  11.2 H 


 


Eosinophils %  0.3 


 


Basophils %  0.7 


 


Nucleated RBC %  0 


 


Sodium   141


 


Potassium   4.3


 


Chloride   103


 


Carbon Dioxide   29


 


Anion Gap   10


 


BUN   15


 


Creatinine   0.8


 


Creat Clearance w eGFR   > 60


 


Random Glucose   81


 


Calcium   8.2 L


 


Phosphorus   3.5


 


Magnesium   2.3


 


Total Bilirubin   0.4


 


AST   14 L


 


ALT   35


 


Alkaline Phosphatase   51


 


Total Protein   6.0 L


 


Albumin   3.0 L











 Microbiology





10/21/18 07:14   Blood - Peripheral Venous   Blood Culture - Final


                            NO GROWTH AFTER 5 DAYS INCUBATION


10/21/18 07:14   Blood - Peripheral Venous   Blood Culture - Final


                            NO GROWTH AFTER 5 DAYS INCUBATION


10/23/18 07:00   Sputum - Expectorated   AFB Smear Concentration - Final


10/23/18 07:00   Sputum - Expectorated   Direct Acid Fast Bacilli Smear - Final


10/23/18 07:00   Sputum - Expectorated   Mycobacterial Culture - Preliminary


10/22/18 15:20   Sputum - Expectorated   AFB Smear Concentration - Final


10/22/18 15:20   Sputum - Expectorated   Direct Acid Fast Bacilli Smear - Final


10/22/18 15:20   Sputum - Expectorated   Mycobacterial Culture - Preliminary


10/22/18 21:45   Sputum - Expectorated   AFB Smear Concentration - Final


10/22/18 21:45   Sputum - Expectorated   Direct Acid Fast Bacilli Smear - Final


10/22/18 21:45   Sputum - Expectorated   Mycobacterial Culture - Preliminary


10/21/18 18:32   Sputum - Expectorated   Gram Stain - Final


10/21/18 18:32   Sputum - Expectorated   Sputum Culture - Final


                            NORMAL RESPIRATORY YOLANDA


10/21/18 18:50   Urine - Urine Clean Catch   Legionella Antigen - Final


10/21/18 18:50   Urine - Urine Clean Catch   Streptococcus pneumoniae Antigen (

M - Final


10/21/18 00:11   Urine - Urine Clean Catch   Urine Culture - Final


10/21/18 00:09   Nasopharyngeal Swab   Influenza Types A,B Antigen - Final


10/21/18 00:09   Nasopharyngeal Swab    - Final








Active Medications





Amiodarone HCl (Cordarone -)  200 mg PO BID [facility]


   Last Admin: 10/26/18 10:19 Dose:  200 mg


Apixaban (Eliquis -)  5 mg PO BID [facility]


   Last Admin: 10/26/18 10:19 Dose:  5 mg


Carvedilol (Coreg -)  12.5 mg PO BID [facility]


   Last Admin: 10/26/18 10:19 Dose:  12.5 mg


Ceftriaxone Sodium 2 gm/ (Dextrose)  100 mls @ 100 mls/hr IVPB DAILY [facility]; 

Protocol


   Last Admin: 10/26/18 10:20 Dose:  100 mls/hr


Ipratropium Bromide (Atrovent 0.02% Nebulizer -)  1 amp NEB RQID [facility]


   Last Admin: 10/26/18 12:29 Dose:  1 amp


Loratadine (Claritin -)  10 mg PO DAILY [facility]


   Last Admin: 10/26/18 10:19 Dose:  10 mg


Prednisone 40 mg/ Prednisone (10 mg)  50 mg PO DAILY [facility]


   Last Admin: 10/26/18 10:19 Dose:  50 mg


Sacubitril/Valsartan (Entresto 24 Mg-26 Mg Tablet)  1 tab PO BID [facility]


   Last Admin: 10/26/18 10:20 Dose:  1 tab








IMAGING:


-EKG: SINUS TACHYCARDIA, INFERIOR INFARCT , AGE UNDETERMINED, ANTERIOR INFARCT (

CITED ON OR BEFORE 21-OCT-2018),  bpm, QTc 401


-ECHO: LV is mildly dilated, LV SF is severely reduced, Global hypokinesis of 

the LV, EF 30-35%, Reduced RV Systolic function, LA and RA are moderately 

dilated, Mild MR, Mild to Mod TR


-CXR: Left upper lobe presumed pneumonia. If findings do not resolve then 

further imaging with CT is suggested. 


-CT Chest without contrast: Extensive left upper lobe consolidation consistent 

with an acute pneumonia. Clinical correlation and follow-up recommended.  








ASSESSMENT/PLAN:


54 y/o M with PMHx of asthma, Afib (not on anticoagulation), HTN, presents with 

SOB for the past three weeks. 





1. Atrial Flutter with RVR


-Cardiology (Dr. Valladares) consulted, appreciate rec's, Continue Amiodarone, 

Eliquis. Increase Coreg. Uptitrate Entresto and aldactone. 


-DC synchronized cardioversion (10/25), Successful synchronized cardioversion 

to sinus rhythm with 120J


-ERNIE report pending


-Increase Carvedilol to 25mg BID (12.5 previously)


-Continue Entresto, Amiodarone


-Continue Eliquis 5 mg BID


-Troponin < 0.02 x2


-BNP 2442.2 


-EKG: Sinus tachy


-ECHO: LV SF is severely reduced, Global hypokinesis of the LV, EF 30-35%


-Strict I&Os, Daily weights


-Will likely need Ischemic work up once HR improved and infection resolved





2. MELISSA pneumonia


-D/C Isolation


-TB Quantiferon negative, AFB Gram stain negative


-Ceftriaxone 2 gram IV daily (Started on 10/21), course completion on 10/27


-Continue Ventolin, Symbicort, Prednisone


-Completed Azithromycin course (10/21-10/22)


-Urine for pneumonia, legionella negative


-Flu swab negative


-Blood cultures NGTD


-Sputum cultures pending


-HIV Screen negative


-ID (Dr. Shen) consulted, appreciate rec's


-Pulmonology (Dr. Benavidez) consulted, appreciate rec's, COLD AGGLUTININS, F/

U CHEST X-RAYS, F/U CHEST CT 6 WKS TO CONFIRM RESOLUTION ON INFILTRATES





3. Hyponatremia


-Resolved


-Urine electrolytes, serum and urine osmolality, urine creatinine noted





4. FEN


-PO fluids


-Continue to monitor for Hyponatremia


-Sodium controlled diet





5. PPx


-DVT: Eliquis





Dispo: Tele monitoring











Visit type





- Emergency Visit


Emergency Visit: Yes


ED Registration Date: 10/21/18


Care time: The patient presented to the Emergency Department on the above date 

and was hospitalized for further evaluation of their emergent condition.





- New Patient


This patient is new to me today: No





- Critical Care


Critical Care patient: No





- Discharge Referral


Referred to [facility] No

## 2018-10-26 NOTE — PN
Progress Note (short form)





- Note


Progress Note: 


OOB to chair.  Overall feels better. 


SOB and cough improving. 


No CP. 





 Intake & Output











 10/23/18 10/24/18 10/25/18 10/26/18





 23:59 23:59 23:59 23:59


 


Intake Total 400 620 920 100


 


Output Total   350 


 


Balance 400 620 570 100


 


Weight 234 lb 7 oz 239 lb 10.279 oz 235 lb 10.786 oz 233 lb 7 oz








 Last Vital Signs











Temp Pulse Resp BP Pulse Ox


 


 98 F   101 H  20   136/104 H  94 L


 


 10/26/18 06:20  10/26/18 06:20  10/26/18 06:20  10/26/18 06:20  10/25/18 20:00








Active Medications





Amiodarone HCl (Cordarone -)  200 mg PO BID [facility]


   Last Admin: 10/26/18 10:19 Dose:  200 mg


Apixaban (Eliquis -)  5 mg PO BID [facility]


   Last Admin: 10/26/18 10:19 Dose:  5 mg


Carvedilol (Coreg -)  12.5 mg PO BID [facility]


   Last Admin: 10/26/18 10:19 Dose:  12.5 mg


Ceftriaxone Sodium 2 gm/ (Dextrose)  100 mls @ 100 mls/hr IVPB DAILY [facility]; 

Protocol


   Last Admin: 10/26/18 10:20 Dose:  100 mls/hr


Ipratropium Bromide (Atrovent 0.02% Nebulizer -)  1 amp NEB RQID [facility]


   Last Admin: 10/26/18 12:29 Dose:  1 amp


Loratadine (Claritin -)  10 mg PO DAILY [facility]


   Last Admin: 10/26/18 10:19 Dose:  10 mg


Prednisone 40 mg/ Prednisone (10 mg)  50 mg PO DAILY [facility]


   Last Admin: 10/26/18 10:19 Dose:  50 mg


Sacubitril/Valsartan (Entresto 24 Mg-26 Mg Tablet)  1 tab PO BID [facility]


   Last Admin: 10/26/18 10:20 Dose:  1 tab














Constitutional: Yes: No Distress


Neck: Yes: Supple


Cardiovascular: Yes: S1S2, normal   


Respiratory: Yes: few scattered rhonchi on the left 


Gastrointestinal: Yes: Normal Bowel Sounds, Soft


Edema: No


Labs: 


  


  


 Laboratory Results - last 24 hr











  10/26/18 10/26/18





  05:30 05:30


 


WBC  8.8 


 


RBC  4.67 


 


Hgb  14.4 


 


Hct  43.3 


 


MCV  92.7 


 


MCH  30.8 


 


MCHC  33.2 


 


RDW  12.8 


 


Plt Count  324 


 


MPV  7.1 L 


 


Absolute Neuts (auto)  6.0 


 


Neutrophils %  68.4 


 


Lymphocytes %  19.4 


 


Monocytes %  11.2 H 


 


Eosinophils %  0.3 


 


Basophils %  0.7 


 


Nucleated RBC %  0 


 


Sodium   141


 


Potassium   4.3


 


Chloride   103


 


Carbon Dioxide   29


 


Anion Gap   10


 


BUN   15


 


Creatinine   0.8


 


Creat Clearance w eGFR   > 60


 


Random Glucose   81


 


Calcium   8.2 L


 


Phosphorus   3.5


 


Magnesium   2.3


 


Total Bilirubin   0.4


 


AST   14 L


 


ALT   35


 


Alkaline Phosphatase   51


 


Total Protein   6.0 L


 


Albumin   3.0 L














 Problem List 





- Problems


(1) A-fib


Code(s): I48.91 - UNSPECIFIED ATRIAL FIBRILLATION   





(2) Atrial fibrillation


Code(s): I48.91 - UNSPECIFIED ATRIAL FIBRILLATION   


Qualifiers: 


   Atrial fibrillation type: paroxysmal   Qualified Code(s): I48.0 - Paroxysmal 

atrial fibrillation   





(3) Atrial flutter


Code(s): I48.92 - UNSPECIFIED ATRIAL FLUTTER   


Qualifiers: 


   Atrial flutter type: typical   Qualified Code(s): I48.3 - Typical atrial 

flutter   





(4) HTN (hypertension)


Code(s): I10 - ESSENTIAL (PRIMARY) HYPERTENSION   


Qualifiers: 


   Hypertension type: essential hypertension   Qualified Code(s): I10 - 

Essential (primary) hypertension   





(5) Pneumonia


Code(s): J18.9 - PNEUMONIA, UNSPECIFIED ORGANISM   


Qualifiers: 


   Pneumonia type: due to unspecified organism   Laterality: left   Lung 

location: upper lobe of lung   Qualified Code(s): J18.1 - Lobar pneumonia, 

unspecified organism   





(6) Asthma


Code(s): J45.909 - UNSPECIFIED ASTHMA, UNCOMPLICATED   








IMP DYSPNEA


      MELISSA PNEUMONIA


      AFLUTTER


      ASTHMA


      HTN





PLAN  IV ABX PER ID 


        INHALED BRONCHODILATORS


        O2


        AC


        F/U CHEST CT 6 WKS TO CONFIRM RESOLUTION ON INFILTRATES


        


DR JIMENES

## 2018-10-26 NOTE — PN
Progress Note, Physician


History of Present Illness: 


Reports improvement in dyspnea, light-headedness, palpitations and chest 

tightness post DCCV and remain in SR. 








- Current Medication List


Current Medications: 


Active Medications





Amiodarone HCl (Cordarone -)  200 mg PO BID [facility]


   Last Admin: 10/26/18 10:19 Dose:  200 mg


Apixaban (Eliquis -)  5 mg PO BID [facility]


   Last Admin: 10/26/18 10:19 Dose:  5 mg


Carvedilol (Coreg -)  12.5 mg PO BID [facility]


   Last Admin: 10/26/18 10:19 Dose:  12.5 mg


Ceftriaxone Sodium 2 gm/ (Dextrose)  100 mls @ 100 mls/hr IVPB DAILY [facility]; 

Protocol


   Last Admin: 10/26/18 10:20 Dose:  100 mls/hr


Ipratropium Bromide (Atrovent 0.02% Nebulizer -)  1 amp NEB RQID [facility]


   Last Admin: 10/26/18 08:36 Dose:  1 amp


Loratadine (Claritin -)  10 mg PO DAILY [facility]


   Last Admin: 10/26/18 10:19 Dose:  10 mg


Prednisone 40 mg/ Prednisone (10 mg)  50 mg PO DAILY [facility]


   Last Admin: 10/26/18 10:19 Dose:  50 mg


Sacubitril/Valsartan (Entresto 24 Mg-26 Mg Tablet)  1 tab PO BID [facility]


   Last Admin: 10/26/18 10:20 Dose:  1 tab











- Objective


Vital Signs: 


 Vital Signs











Temperature  98 F   10/26/18 06:20


 


Pulse Rate  101 H  10/26/18 06:20


 


Respiratory Rate  20   10/26/18 06:20


 


Blood Pressure  136/104 H  10/26/18 06:20


 


O2 Sat by Pulse Oximetry (%)  94 L  10/25/18 20:00











Constitutional: Yes: No Distress, Calm


Neck: Yes: Supple


Cardiovascular: Yes: Regular Rate and Rhythm


Respiratory: Yes: Regular, CTA Bilaterally, On Nasal O2


Gastrointestinal: Yes: Normal Bowel Sounds, Soft


Edema: No


Labs: 


 CBC, BMP





 10/26/18 05:30 





 10/26/18 05:30 





 INR, PTT











INR  1.43  (0.83-1.09)  H  10/22/18  06:00    














- ....Imaging


Chest X-ray: Report Reviewed (Resolved MELISSA infiltrate)


EKG: Report Reviewed (Tele: SR)





Problem List





- Problems


(1) Asthma


Code(s): J45.909 - UNSPECIFIED ASTHMA, UNCOMPLICATED   


Qualifiers: 


   Asthma severity: mild   Asthma persistence: intermittent 





(2) Atrial flutter


Code(s): I48.92 - UNSPECIFIED ATRIAL FLUTTER   


Qualifiers: 


   Atrial flutter type: typical   Qualified Code(s): I48.3 - Typical atrial 

flutter   





(3) HTN (hypertension)


Code(s): I10 - ESSENTIAL (PRIMARY) HYPERTENSION   


Qualifiers: 


   Hypertension type: essential hypertension   Qualified Code(s): I10 - 

Essential (primary) hypertension   





(4) Pneumonia


Code(s): J18.9 - PNEUMONIA, UNSPECIFIED ORGANISM   


Qualifiers: 


   Pneumonia type: due to unspecified organism   Laterality: left   Lung 

location: upper lobe of lung   Qualified Code(s): J18.1 - Lobar pneumonia, 

unspecified organism   





(5) History of cardioversion


Code(s): Z98.890 - OTHER SPECIFIED POSTPROCEDURAL STATES   





(6) Systolic dysfunction without heart failure


Code(s): I51.89 - OTHER ILL-DEFINED HEART DISEASES   





(7) Patent foramen ovale


Code(s): Q21.1 - ATRIAL SEPTAL DEFECT   





Assessment/Plan


Echocardiography revealed severe LV systolic dysfunction with LVEF 30-35%, LAE, 

mild MR, mild to moderate TR with RVSP 37 mmHg


ERNIE report is to follow


1. Severe LV systolic dysfunction


2. Mild to moderate eccentric MR


3. Mild TR


4. PFO with left to right shunt via color Doppler. Bubbles pass right to left 

with valsalva


5. No pericardial effusion


6. Small atherosclerotic plaque in thoracic aorta and aortic arch





ASSESSMENT:


1. Dyspnea referable to acute exacerbation of asthma with left upper lobe 

pneumonia improving


2. Persistent atrial flutter with RVR with prior history of paroxysmal atrial 

fibrillation on anticoagulation with DOAC's/Eliquis [id number] score of 1-2


3. Dilated probably nonischemic cardiomyopathy, possible tachycardia-induced 

myopathy


4. PFO


5. HTN


6. Ruled out for AFB, HIV negative


7. Suspect OSAS





PLAN:





1. Continue amio 200 bid oral load


2. Increase Coreg 25 bid and Entresto 24/26 bid and eventual aldactone with 

uptitration as tolerated


3. Continue Eliquis 5 bid


4. Complete empiric antibiotic coverage, bronchodilators, oral steroids and O2 

as needed


5. Ischemic evaluation once clinically improved


6. PSG as outpatient, [facility] prior to d/c

## 2018-10-27 VITALS — TEMPERATURE: 97.5 F | SYSTOLIC BLOOD PRESSURE: 109 MMHG | DIASTOLIC BLOOD PRESSURE: 69 MMHG | HEART RATE: 75 BPM

## 2018-10-27 RX ADMIN — CEFTRIAXONE SODIUM SCH MLS/HR: 2 INJECTION, POWDER, FOR SOLUTION INTRAMUSCULAR; INTRAVENOUS at 09:53

## 2018-10-27 RX ADMIN — LORATADINE SCH MG: 10 TABLET ORAL at 09:53

## 2018-10-27 RX ADMIN — APIXABAN SCH MG: 5 TABLET, FILM COATED ORAL at 09:52

## 2018-10-27 RX ADMIN — IPRATROPIUM BROMIDE SCH AMP: 0.5 SOLUTION RESPIRATORY (INHALATION) at 08:40

## 2018-10-27 RX ADMIN — AMIODARONE HYDROCHLORIDE SCH MG: 200 TABLET ORAL at 09:52

## 2018-10-27 RX ADMIN — CARVEDILOL SCH MG: 25 TABLET, FILM COATED ORAL at 09:53

## 2018-10-27 RX ADMIN — IPRATROPIUM BROMIDE SCH: 0.5 SOLUTION RESPIRATORY (INHALATION) at 12:09

## 2018-10-27 RX ADMIN — SACUBITRIL AND VALSARTAN SCH TAB: 24; 26 TABLET, FILM COATED ORAL at 09:52

## 2018-10-27 NOTE — DS
Physical Exam: 


SUBJECTIVE: Patient seen and examined


PAtient is feeling better , wants to go home. 


OBJECTIVE:


 Vital Signs











Temperature  97.5 F L  10/27/18 09:00


 


Pulse Rate  75   10/27/18 09:00


 


Respiratory Rate  18   10/27/18 09:00


 


Blood Pressure  109/69   10/27/18 09:00


 


O2 Sat by Pulse Oximetry (%)  94 L  10/27/18 09:00











 


PHYSICAL EXAM





GENERAL: The patient is awake, alert, and fully oriented, in no acute distress.


HEAD: Normal with no signs of trauma.


EYES: PERRL, extraocular movements intact, sclera anicteric, conjunctiva clear. 


ENT: Ears normal,  oropharynx clear without exudates, moist mucous membranes.


NECK: Trachea midline, full range of motion, supple. 


LUNGS: Breath sounds equal, clear to auscultation bilaterally, no wheezes, no 

crackles, no accessory muscle use. 


HEART: Regular rate and rhythm, S1, S2 without murmur, rub or gallop.


ABDOMEN: Soft, nontender, nondistended, normoactive bowel sounds, no guarding, 

no rebound, no hepatosplenomegaly, no masses.


EXTREMITIES: 2+ pulses, warm, well-perfused, no edema. 


NEUROLOGICAL: Cranial nerves II through XII grossly intact. Normal speech, gait 

not observed.


PSYCH: Normal mood, normal affect.


SKIN: Warm, dry, normal turgor, no rashes or lesions noted.





LABS


 Laboratory Results - last 24 hr











  10/27/18





  06:35


 


TSH  1.73  D


 


Free T4  1.22 H





 CBCD











WBC  8.8 K/mm3 (4.0-10.0)   10/26/18  05:30    


 


RBC  4.67 M/mm3 (4.00-5.60)   10/26/18  05:30    


 


Hgb  14.4 GM/dL (11.7-16.9)   10/26/18  05:30    


 


Hct  43.3 % (35.4-49)   10/26/18  05:30    


 


MCV  92.7 fl (80-96)   10/26/18  05:30    


 


MCHC  33.2 g/dl (32.0-35.9)   10/26/18  05:30    


 


RDW  12.8 % (11.9-15.9)   10/26/18  05:30    


 


Plt Count  324 K/MM3 (134-434)   10/26/18  05:30    


 


MPV  7.1 fl (7.5-11.1)  L  10/26/18  05:30    








 CMP











Sodium  141 mmol/L (136-145)   10/26/18  05:30    


 


Potassium  4.3 mmol/L (3.5-5.1)   10/26/18  05:30    


 


Chloride  103 mmol/L ()   10/26/18  05:30    


 


Carbon Dioxide  29 mmol/L (21-32)   10/26/18  05:30    


 


Anion Gap  10 MMOL/L (8-16)   10/26/18  05:30    


 


BUN  15 mg/dL (7-18)   10/26/18  05:30    


 


Creatinine  0.8 mg/dL (0.55-1.3)   10/26/18  05:30    


 


Creat Clearance w eGFR  > 60  (>60)   10/26/18  05:30    


 


Random Glucose  81 mg/dL ()   10/26/18  05:30    


 


Calcium  8.2 mg/dL (8.5-10.1)  L  10/26/18  05:30    


 


Total Bilirubin  0.4 mg/dL (0.2-1)   10/26/18  05:30    


 


AST  14 U/L (15-37)  L  10/26/18  05:30    


 


ALT  35 U/L (13-61)   10/26/18  05:30    


 


Alkaline Phosphatase  51 U/L ()   10/26/18  05:30    


 


Total Protein  6.0 g/dl (6.4-8.2)  L  10/26/18  05:30    


 


Albumin  3.0 g/dl (3.4-5.0)  L  10/26/18  05:30    








 CARDIAC ENZYMES











Troponin I  < 0.02 ng/ml (0.00-0.05)   10/21/18  09:43    








 Current Medications











Generic Name Dose Route Start Last Admin





  Trade Name Freq  PRN Reason Stop Dose Admin


 


Amiodarone HCl  200 mg  10/25/18 13:00  10/27/18 09:52





  Cordarone -  PO   200 mg





  BID RAEGAN   Administration





     





     





     





     


 


Apixaban  5 mg  10/21/18 13:45  10/27/18 09:52





  Eliquis -  PO   5 mg





  BID RAEGAN   Administration





     





     





     





     


 


Carvedilol  25 mg  10/26/18 22:00  10/27/18 09:53





  Coreg -  PO   25 mg





  BID RAEGAN   Administration





     





     





     





     


 


Ceftriaxone Sodium 2 gm/  100 mls @ 100 mls/hr  10/23/18 10:00  10/27/18 09:53





  Dextrose  IVPB   100 mls/hr





  DAILY RAEGAN   Administration





     





     





  Protocol   





     


 


Ipratropium Bromide  1 amp  10/24/18 16:00  10/27/18 12:09





  Atrovent 0.02% Nebulizer -  NEB   Not Given





  RQID RAEGAN   





     





     





     





     


 


Loratadine  10 mg  10/21/18 10:00  10/27/18 09:53





  Claritin -  PO   10 mg





  DAILY RAEGAN   Administration





     





     





     





     


 


Prednisone 40 mg/ Prednisone  50 mg  10/25/18 10:00  10/27/18 09:52





  10 mg  PO   50 mg





  DAILY RAEGAN   Administration





     





     





     





     


 


Sacubitril/Valsartan  1 tab  10/23/18 10:00  10/27/18 09:52





  Entresto 24 Mg-26 Mg Tablet  PO   1 tab





  BID RAEGAN   Administration





     





     





     





     








 Home Medications











 Medication  Instructions  Recorded


 


Albuterol Sulfate Inhaler - 1 - 2 inh PO QID PRN 10/13/14





[Ventolin HFA Inhaler -]  


 


Budesonide/Formeterol Fumarate 1 inh PO DAILY 11/20/17





[SYMBICORT 160/4.5mcg -]  


 


Diltiazem Cd [Cardizem Cd -] 300 mg PO DAILY 11/20/17


 


Loratadine [Claritin] 10 mg PO DAILY 10/21/18


 


Valsartan 40 mg PO DAILY 10/21/18














HOSPITAL COURSE:





Date of Admission:10/21/18





Date of Discharge: 10/27/18





ECHO 10/22/2018: left ventricle dilated mildly, Left ventricular systolic 

function is severely reduced, severe global hypokinesis of the left ventricle, 

EJF 30-35%,


LV function is severely reduced. left atrium and right atrium is dilated 

moderately, mild to moderate MR, mild to moderate TR, pulmonary artery pressure 

is at least 37mmhg, RA pressure of 15mmHg.  No pericardial effusion. 





ASSESSMENT AND PLAN:


Patient is a 54 yo male  with PMHx of paroxysmal Afib not on AC (prior h/o 

being on coumadin), Asthma/bronchitis, admitted with MELISSA CAP and Atrial flutter 

with RVR, found with new cardiomyopathy





#MELISSA PNA s/p sepsis, On Ceftriaxone  7/7 , id appreciated ,legionella ruled out

, AFBx3 negative, Strept pneum.neagtive, Influenza is neg. will give 3 more 

days ceftin 500mg BID


#Paroxysmal Atrial flutter with RVR s/p Cardioversion and ERNIE, on Eliquis 

continue and on Amiodarone 20mg po bid x 7 days given until he sees dr. Ricardo for 

evaluation for [facility]est. discussed with  will continue Shemar,coreg,

eliquis. will discontinue Claritin and discussed with the patient not to take 

the medication with Amiodarone since DDx. 


#Global hypokinesis of left ventricle with EJF of 30-35%, cardio is on the 

case. patient is started on Coreg, Shemar and Amiodorane (TSH 0.84) continue 


#Suspected COPD exacerbation/asthma  with heavy smoking history , will continue 

with Prednisone with 12 day day taper dose, giving atrovent nebs #25 Rx. 


#HTN on Shemar, Coreg.


DVT PX: Eliquis 


AS per cardiologist: [facility]-Vest utilization pending LVEF re-assessment in 90 

days as per current guidelines, and if LVEF remains less than 35% to proceed 

with prophylactic ICD implantation.  Additional outpatient evaluation would 

include R&LHC/coronary angiography


F/U with Dr. Ricardo in the office in 1 week 





discharge time 45 minutes.


Minutes to complete discharge: 45





Discharge Summary


Reason For Visit: HEART FAILURE/ATRIAL FIBRILLATION/PNEMONIA


Current Active Problems





A-fib (Acute)


Asthma (Acute)


Asthma attack (Acute)


Atrial fibrillation (Acute)


Atrial flutter (Acute)


Bronchial asthma (Acute)


HTN (hypertension) (Acute)


Heart failure (Acute)


History of cardioversion (Acute)


Patent foramen ovale (Acute)


Pneumonia (Acute)


Systolic dysfunction without heart failure (Acute)








Condition: Guarded





- Instructions


Diet, Activity, Other Instructions: 











F/U CHEST CT 6 WKS TO CONFIRM RESOLUTION ON INFILTRATES


Consider sleep study as outpatient most likely has MOISES





AS per cardiologist: [employer] utilization re-assessment in 90 days as per 

current guidelines, and if LVEF remains less than 35% to proceed with 

prophylactic ICD implantation.  Additional outpatient evaluation would include 

right and left heart coronary angiography


F/U with Dr. Ricardo in the office in 1 week 


Follow with Pulmonary within a week for an evaluations of sleep apnea. 





Low fat and Low carb. diet is recommended.








- Home Medications


Comprehensive Discharge Medication List: 


Ambulatory Orders





Albuterol Sulfate Inhaler - [Ventolin HFA Inhaler -] 1 - 2 inh PO QID PRN 10/13/

14 


Budesonide/Formeterol Fumarate [SYMBICORT 160/4.5mcg -] 1 inh PO DAILY 11/20/17 


Diltiazem Cd [Cardizem Cd -] 300 mg PO DAILY 11/20/17 


Loratadine [Claritin] 10 mg PO DAILY 10/21/18 


Valsartan 40 mg PO DAILY 10/21/18 








This patient is new to me today: No


Emergency Visit: Yes


ED Registration Date: 10/21/18


Care time: The patient presented to the Emergency Department on the above date 

and was hospitalized for further evaluation of their emergent condition.


Critical Care patient: No





- Discharge Referral


Referred to [facility] No

## 2018-10-27 NOTE — PN
Progress Note (short form)





- Note


Progress Note: 


Chief Complaint: Events noted, notes reviewed, denies any chest pain or dyspnea

, sinus rhythm is maintained





History of Present Illness: 


Seen and examined on telemetry. Events noted, notes reviewed, denies any chest 

pain or dyspnea, sinus rhythm is maintained





Echocardiography revealed systolic LV size dysfunction with LVEF 30-35%, LAE, 

mild MR, mild to moderate TR with RVSP 37 mmHg





- Current Medication List


 


 Current Medications





Amiodarone HCl (Cordarone -)  200 mg PO BID [facility]


   Last Admin: 10/26/18 22:06 Dose:  200 mg


Apixaban (Eliquis -)  5 mg PO BID [facility]


   Last Admin: 10/26/18 22:06 Dose:  5 mg


Carvedilol (Coreg -)  25 mg PO BID [facility]


   Last Admin: 10/26/18 22:06 Dose:  25 mg


Ceftriaxone Sodium 2 gm/ (Dextrose)  100 mls @ 100 mls/hr IVPB DAILY [facility]; 

Protocol


   Last Admin: 10/26/18 10:20 Dose:  100 mls/hr


Ipratropium Bromide (Atrovent 0.02% Nebulizer -)  1 amp NEB RQID [facility]


   Last Admin: 10/26/18 22:27 Dose:  1 amp


Loratadine (Claritin -)  10 mg PO DAILY [facility]


   Last Admin: 10/26/18 10:19 Dose:  10 mg


Prednisone 40 mg/ Prednisone (10 mg)  50 mg PO DAILY [facility]


   Last Admin: 10/26/18 10:19 Dose:  50 mg


Sacubitril/Valsartan (Entresto 24 Mg-26 Mg Tablet)  1 tab PO BID [facility]


   Last Admin: 10/26/18 22:07 Dose:  1 tab








Review of Systems





Cardiovascular: As noted above


Respiratory: denies: denies: Cough or Sputum Production 


Gastrointestinal: denies: Nausea, Vomiting, Diarrhea, Constipation or Abdominal 

Discomfort    


Musculoskeletal: No Symptoms Reported


Endocrine: No Symptoms Reported





- Objective


Vital Signs: 


 


 Last Vital Signs











Temp Pulse Resp BP Pulse Ox


 


 98.3 F   92 H  18   125/88   95 


 


 10/27/18 05:00  10/27/18 05:00  10/27/18 05:00  10/27/18 05:00  10/26/18 21:00








 Intake & Output











 10/24/18 10/25/18 10/26/18 10/27/18





 23:59 23:59 23:59 23:59


 


Intake Total 620 920 630 250


 


Output Total  350  


 


Balance 620 570 630 250


 


Weight 239 lb 10.279 oz 235 lb 10.786 oz 233 lb 7 oz 234 lb 3.2 oz











Constitutional: No Distress


Respiratory: Clear to A&P


Cardiovascular: S1 S2 Regular Rate and Rhythm 


Gastrointestinal: Soft Benign Normal Bowel Sounds


Ext: No Edema intact distal pulses no calf tenderness





Labs: 


 


 CBC, BMP





 10/26/18 05:30 





 10/26/18 05:30 





 


 Hepatic Panel











Total Bilirubin  0.4 mg/dL (0.2-1)   10/26/18  05:30    


 


AST  14 U/L (15-37)  L  10/26/18  05:30    


 


ALT  35 U/L (13-61)   10/26/18  05:30    


 


Alkaline Phosphatase  51 U/L ()   10/26/18  05:30    


 


Albumin  3.0 g/dl (3.4-5.0)  L  10/26/18  05:30    














Assessment/Plan





Assessment: 





1. Paroxysmal atrial flutter post ERNIE guided cardioversion, with prior history 

of paroxysmal atrial fibrillation on anticoagulation with DOAC's/Eliquis 

[id number] score of 1-2


2. Dilated probably non ischemic cardiomyopathy, possible tachycardia induced 

cardiomyopathy


3. Acute exacerbation of asthma with left upper lobe pneumonia, resolving


4. HTN


5. History of bronchial asthma with acute exacerbation as noted above





PLAN:





1. Continue Coreg 


2. Continue Entresto and titrate dosage as tolerated and as needed


3. Continue Eliquis


4. Life-Vest utilization pending LVEF re-assessment in 90 days as per current 

guidelines, and if LVEF remains less than 35% to proceed with prophylactic ICD 

implantation


5. Additional outpatient evaluation would include R&LHC/coronary angiography


6. Advised to ambulate and can be D/C home from the cardiovascular point of 

view if clinically stable and F/U in the office in 1 week 





Above was discussed in detail with the patient   














Megan Schmidt M.D.

## 2018-10-27 NOTE — PN
Progress Note (short form)





- Note


Progress Note: 














 Vital Signs











Temperature  98.3 F   10/27/18 05:00


 


Pulse Rate  92 H  10/27/18 05:00


 


Respiratory Rate  18   10/27/18 05:00


 


Blood Pressure  125/88   10/27/18 05:00


 


O2 Sat by Pulse Oximetry (%)  95   10/26/18 21:00








GENERAL: The patient is awake, alert, and fully oriented, in no acute distress.


HEAD: Normal with no signs of trauma.


EYES: PERRL, extraocular movements intact, sclera anicteric, conjunctiva clear. 


neck: soft, supple, no JVD visualized


Chest: few right basilar and left upper lobe rales, positive air entry, no 

wheezing


ENT: Ears normal, oropharynx clear without exudates, moist mucous membranes.


NECK: Trachea midline, full range of motion, supple. 


CVS: S1S2 irregular 


ABDOMEN: Soft, nontender, nondistended, normoactive bowel sounds, no guarding, 

no rebound


EXTREMITIES: 2+ pulses, warm, well-perfused, no edema. 


NEUROLOGICAL: Cranial nerves II through XII grossly intact. Normal speech, 

ambulating well in the room


PSYCH: Normal mood, normal affect.


SKIN: Warm, dry, normal turgor, no rashes or lesions noted


 CBCD











WBC  8.8 K/mm3 (4.0-10.0)   10/26/18  05:30    


 


RBC  4.67 M/mm3 (4.00-5.60)   10/26/18  05:30    


 


Hgb  14.4 GM/dL (11.7-16.9)   10/26/18  05:30    


 


Hct  43.3 % (35.4-49)   10/26/18  05:30    


 


MCV  92.7 fl (80-96)   10/26/18  05:30    


 


MCHC  33.2 g/dl (32.0-35.9)   10/26/18  05:30    


 


RDW  12.8 % (11.9-15.9)   10/26/18  05:30    


 


Plt Count  324 K/MM3 (134-434)   10/26/18  05:30    


 


MPV  7.1 fl (7.5-11.1)  L  10/26/18  05:30    








 CMP











Sodium  141 mmol/L (136-145)   10/26/18  05:30    


 


Potassium  4.3 mmol/L (3.5-5.1)   10/26/18  05:30    


 


Chloride  103 mmol/L ()   10/26/18  05:30    


 


Carbon Dioxide  29 mmol/L (21-32)   10/26/18  05:30    


 


Anion Gap  10 MMOL/L (8-16)   10/26/18  05:30    


 


BUN  15 mg/dL (7-18)   10/26/18  05:30    


 


Creatinine  0.8 mg/dL (0.55-1.3)   10/26/18  05:30    


 


Creat Clearance w eGFR  > 60  (>60)   10/26/18  05:30    


 


Random Glucose  81 mg/dL ()   10/26/18  05:30    


 


Calcium  8.2 mg/dL (8.5-10.1)  L  10/26/18  05:30    


 


Total Bilirubin  0.4 mg/dL (0.2-1)   10/26/18  05:30    


 


AST  14 U/L (15-37)  L  10/26/18  05:30    


 


ALT  35 U/L (13-61)   10/26/18  05:30    


 


Alkaline Phosphatase  51 U/L ()   10/26/18  05:30    


 


Total Protein  6.0 g/dl (6.4-8.2)  L  10/26/18  05:30    


 


Albumin  3.0 g/dl (3.4-5.0)  L  10/26/18  05:30    








 CARDIAC ENZYMES











Troponin I  < 0.02 ng/ml (0.00-0.05)   10/21/18  09:43    








 Current Medications











Generic Name Dose Route Start Last Admin





  Trade Name Andrea  PRN Reason Stop Dose Admin


 


Amiodarone HCl  200 mg  10/25/18 13:00  10/26/18 22:06





  Cordarone -  PO   200 mg





  BID RAEGAN   Administration





     





     





     





     


 


Apixaban  5 mg  10/21/18 13:45  10/26/18 22:06





  Eliquis -  PO   5 mg





  BID RAEGAN   Administration





     





     





     





     


 


Carvedilol  25 mg  10/26/18 22:00  10/26/18 22:06





  Coreg -  PO   25 mg





  BID RAEGAN   Administration





     





     





     





     


 


Ceftriaxone Sodium 2 gm/  100 mls @ 100 mls/hr  10/23/18 10:00  10/26/18 10:20





  Dextrose  IVPB   100 mls/hr





  DAILY RAEGAN   Administration





     





     





  Protocol   





     


 


Ipratropium Bromide  1 amp  10/24/18 16:00  10/26/18 22:27





  Atrovent 0.02% Nebulizer -  NEB   1 amp





  RQID RAEGAN   Administration





     





     





     





     


 


Loratadine  10 mg  10/21/18 10:00  10/26/18 10:19





  Claritin -  PO   10 mg





  DAILY RAEGAN   Administration





     





     





     





     


 


Prednisone 40 mg/ Prednisone  50 mg  10/25/18 10:00  10/26/18 10:19





  10 mg  PO   50 mg





  DAILY RAEGAN   Administration





     





     





     





     


 


Sacubitril/Valsartan  1 tab  10/23/18 10:00  10/26/18 22:07





  Entresto 24 Mg-26 Mg Tablet  PO   1 tab





  BID RAEGAN   Administration





     





     





     





     








 Home Medications











 Medication  Instructions  Recorded


 


Albuterol Sulfate Inhaler - 1 - 2 inh PO QID PRN 10/13/14





[Ventolin HFA Inhaler -]  


 


Budesonide/Formeterol Fumarate 1 inh PO DAILY 11/20/17





[SYMBICORT 160/4.5mcg -]  


 


Diltiazem Cd [Cardizem Cd -] 300 mg PO DAILY 11/20/17


 


Loratadine [Claritin] 10 mg PO DAILY 10/21/18


 


Valsartan 40 mg PO DAILY 10/21/18








ECHO 10/22/2018: left ventricle dilated mildly, Left ventricular systolic 

function is severely reduced, severe global hypokinesis of the left ventricle, 

EJF 30-35%,


LV function is severely reduced. left atrium and right atrium is dilated 

moderately, mild to moderate MR, mild to moderate TR, pulmonary artery pressure 

is at least 37mmhg, RA pressure of 15mmHg.  No pericardial effusion. 





ASSESSMENT AND PLAN:


Patient is a 54 yo male  with PMHx of paroxysmal Afib not on AC (prior h/o 

being on coumadin), Asthma/bronchitis, admitted with MELISSA CAP and Atrial flutter 

with RVR, found with new cardiomyopathy





#MELISSA PNA with  sepsis, On Ceftriaxone  , id appreciated ,legionella ruled out, 

AFBx3 negative, Strept pneum.neagtive, Influenza is neg.


#Atrial flutter with RVR s/p Cardioversion and ERNIE, on Eliquis , will continue 

to monitor the HR , on Amiodoran for now, cardio on the case


#Global hypokinesis of left ventricle with EJF of 30-35%, cardio is on the 

case. patient is started on Coreg, Shemar and Amiodorane (TSH 0.84) 


#Suspected COPD exacerbation/asthma  with heavy smoking history , will continue 

with Prednisone, atrovent nebs, 


#HTN on Shemar, Coreg.


DVT PX: Eliquis

## 2018-10-30 ENCOUNTER — APPOINTMENT (OUTPATIENT)
Dept: CARDIOLOGY | Facility: CLINIC | Age: 53
End: 2018-10-30

## 2018-10-30 ENCOUNTER — LABORATORY RESULT (OUTPATIENT)
Age: 53
End: 2018-10-30

## 2018-10-30 ENCOUNTER — NON-APPOINTMENT (OUTPATIENT)
Age: 53
End: 2018-10-30

## 2018-10-30 VITALS
HEART RATE: 79 BPM | WEIGHT: 232 LBS | HEIGHT: 76 IN | TEMPERATURE: 97.6 F | SYSTOLIC BLOOD PRESSURE: 116 MMHG | BODY MASS INDEX: 28.25 KG/M2 | OXYGEN SATURATION: 95 % | DIASTOLIC BLOOD PRESSURE: 89 MMHG | RESPIRATION RATE: 12 BRPM

## 2018-10-30 DIAGNOSIS — Q21.1 ATRIAL SEPTAL DEFECT: ICD-10-CM

## 2018-10-30 DIAGNOSIS — Z87.01 PERSONAL HISTORY OF PNEUMONIA (RECURRENT): ICD-10-CM

## 2018-10-30 DIAGNOSIS — Z78.9 OTHER SPECIFIED HEALTH STATUS: ICD-10-CM

## 2018-10-30 DIAGNOSIS — I71.2 THORACIC AORTIC ANEURYSM, W/OUT RUPTURE: ICD-10-CM

## 2018-10-30 DIAGNOSIS — Z87.891 PERSONAL HISTORY OF NICOTINE DEPENDENCE: ICD-10-CM

## 2018-10-30 RX ORDER — ALBUTEROL SULFATE 90 UG/1
108 (90 BASE) AEROSOL, METERED RESPIRATORY (INHALATION)
Refills: 0 | Status: ACTIVE | COMMUNITY

## 2018-10-31 ENCOUNTER — MESSAGE (OUTPATIENT)
Age: 53
End: 2018-10-31

## 2018-10-31 LAB
ALBUMIN SERPL ELPH-MCNC: 4.2 G/DL
ALP BLD-CCNC: 49 U/L
ALT SERPL-CCNC: 28 U/L
ANION GAP SERPL CALC-SCNC: 15 MMOL/L
AST SERPL-CCNC: 18 U/L
BASOPHILS # BLD AUTO: 0.06 K/UL
BASOPHILS NFR BLD AUTO: 0.5 %
BILIRUB SERPL-MCNC: 0.4 MG/DL
BUN SERPL-MCNC: 15 MG/DL
CALCIUM SERPL-MCNC: 9.6 MG/DL
CHLORIDE SERPL-SCNC: 99 MMOL/L
CHOLEST SERPL-MCNC: 220 MG/DL
CHOLEST/HDLC SERPL: 3.3 RATIO
CO2 SERPL-SCNC: 24 MMOL/L
CREAT SERPL-MCNC: 0.94 MG/DL
EOSINOPHIL # BLD AUTO: 0.06 K/UL
EOSINOPHIL NFR BLD AUTO: 0.5 %
GLUCOSE SERPL-MCNC: 122 MG/DL
HCT VFR BLD CALC: 50.5 %
HDLC SERPL-MCNC: 66 MG/DL
HGB BLD-MCNC: 17.2 G/DL
IMM GRANULOCYTES NFR BLD AUTO: 2.7 %
INR PPP: 1.16 RATIO
LDLC SERPL CALC-MCNC: 135 MG/DL
LYMPHOCYTES # BLD AUTO: 1.47 K/UL
LYMPHOCYTES NFR BLD AUTO: 11.9 %
MAN DIFF?: NORMAL
MCHC RBC-ENTMCNC: 31.9 PG
MCHC RBC-ENTMCNC: 34.1 GM/DL
MCV RBC AUTO: 93.7 FL
MONOCYTES # BLD AUTO: 1.15 K/UL
MONOCYTES NFR BLD AUTO: 9.3 %
NEUTROPHILS # BLD AUTO: 9.3 K/UL
NEUTROPHILS NFR BLD AUTO: 75.1 %
PLATELET # BLD AUTO: 418 K/UL
POTASSIUM SERPL-SCNC: 4.9 MMOL/L
PROT SERPL-MCNC: 6.6 G/DL
PT BLD: 13 SEC
RBC # BLD: 5.39 M/UL
RBC # FLD: 13.7 %
SODIUM SERPL-SCNC: 138 MMOL/L
TRIGL SERPL-MCNC: 97 MG/DL
TSH SERPL-ACNC: 2.71 UIU/ML
WBC # FLD AUTO: 12.38 K/UL

## 2018-11-01 ENCOUNTER — MESSAGE (OUTPATIENT)
Age: 53
End: 2018-11-01

## 2018-11-06 VITALS
RESPIRATION RATE: 16 BRPM | SYSTOLIC BLOOD PRESSURE: 120 MMHG | OXYGEN SATURATION: 98 % | DIASTOLIC BLOOD PRESSURE: 82 MMHG | HEART RATE: 76 BPM

## 2018-11-06 RX ORDER — CHLORHEXIDINE GLUCONATE 213 G/1000ML
1 SOLUTION TOPICAL ONCE
Qty: 0 | Refills: 0 | Status: DISCONTINUED | OUTPATIENT
Start: 2018-11-07 | End: 2018-11-07

## 2018-11-06 NOTE — H&P ADULT - HISTORY OF PRESENT ILLNESS
**please confirm Medications on arrival**    52 y/o M, former smoker (quit 2015 1/2 PPD x  20 years), with PMHx of HTN, HLD, Asthma (two prior hospitalizations, denies intubation), Ascending Aortic aneurysm, paroxysmal A Fib (in the setting of URI related Asthma exacerbation), newly diagnosed Systolic HF (LVEF 30-35% on 10/27/18) new onset of Atrial Flutter (s/p direct cardioversion and started on Amiodarone (for one week only as per patient) and Eloquis last dose 11/4/18) on admission to [facility] for Pneumonia 10/21-10/27/2018 with abnormal Echo 10/22/18: LVEF: 30-35%, LV systolic function severely reduced with severe global hypokinesis, Left ventricle mildly dilated, reduced RV systolic function, moderate biatrial dilation, mild MR, mild to moderate TR, Pulmonary Artery systolic pressure is at least 37 mmHg, per MD note. Patient subsequently presented to his Cardiologist, Dr. Robin Gaona, for follow up after c/o residual exertional dyspnea when going up stairs and denying any associated chest pain, palpitations, dizziness, diaphoresis, palpitations, fatigue, LE edema, orthopnea, PND, and syncope. Due to patient’s risk factors, CCS Angina Class II symptoms, and abnormal Echo, patient is referred for cardiac catheterization w/ possible intervention.   EKG 10/30/18: Sinus rhythm, poor R wave progression, non-specific T wave abnormalities, cannot rule out inferior infarct, per MD note  Holter Monitor Report 7/21/16: Sinus rhythm with sinus arrhythmia, occasional PVCs, frequent PACs, no evidence of paroxysmal atrial fibrillation  MRA Chest 2/17/16: Ascending Aortic root aneurysm measuring 4.1 cm. **please confirm Medications on arrival**    52 y/o M, former smoker (quit 2015 1/2 PPD x  20 years), with PMHx of HTN, HLD, Asthma (two prior hospitalizations, denies intubation), Ascending Aortic aneurysm, paroxysmal A Fib (in the setting of URI related Asthma exacerbation), newly diagnosed Systolic HF (LVEF 30-35% on 10/27/18) new onset of Atrial Flutter (s/p direct cardioversion and started on Amiodarone (for one week only as per patient) and Eloquis last dose 11/4/18) on admission to [facility] for Pneumonia 10/21-10/27/2018 with abnormal Echo 10/22/18: LVEF: 30-35%, LV systolic function severely reduced with severe global hypokinesis, Left ventricle mildly dilated, reduced RV systolic function, moderate biatrial dilation, mild MR, mild to moderate TR, Pulmonary Artery systolic pressure is at least 37 mmHg, per MD note. Patient subsequently presented to his Cardiologist, Dr. Robin Gaona, for follow up after c/o residual exertional dyspnea when going up stairs and denying any associated chest pain, palpitations, dizziness, diaphoresis, palpitations, fatigue, LE edema, orthopnea, PND, and syncope. Due to patient’s risk factors, CCS Angina Class II symptoms, and abnormal Echo, patient is referred for cardiac catheterization w/ possible intervention.   EKG 10/30/18: Sinus rhythm, poor R wave progression, non-specific T wave abnormalities, cannot rule out inferior infarct, per MD note.  Echo 10/22/18: LVEF: 30-35%, LV systolic function severely reduced with severe global hypokinesis, Left ventricle mildly dilated, reduced RV systolic function, moderate biatrial dilation, mild MR, mild to moderate TR, Pulmonary Artery systolic pressure is at least 37 mmHg, per MD note.  Holter Monitor Report 7/21/16: Sinus rhythm with sinus arrhythmia, occasional PVCs, frequent PACs, no evidence of paroxysmal atrial fibrillation.  MRA Chest 2/17/16: Ascending Aortic root aneurysm measuring 4.1 cm. 52 y/o M, former Smoker (quit 2015 1/2 PPD x 20 years), with PMHx of HTN, HLD, Asthma (two prior hospitalizations, denies intubation), Ascending Aortic aneurysm, paroxysmal A Fib (in the setting of URI related Asthma exacerbation), newly diagnosed Systolic HF (LVEF 30-35% on 10/27/18) new onset of Atrial Flutter (s/p direct cardioversion with one week course of Amiodarone as per patient and Eloquis last dose 11/4/18) diagnosed on admission to [facility] for Pneumonia 10/21-10/27/2018 with abnormal Echo 10/22/18: LVEF: 30-35%, LV systolic function severely reduced with severe global hypokinesis, Left ventricle mildly dilated, reduced RV systolic function, moderate biatrial dilation, mild MR, mild to moderate TR, Pulmonary Artery systolic pressure is at least 37 mmHg, per MD note. Patient subsequently presented to his Cardiologist, Dr. Robin Gaona, for follow up after c/o residual exertional dyspnea when going up flights of stairs and denying any associated chest pain, palpitations, dizziness, diaphoresis, palpitations, fatigue, LE edema, orthopnea, PND, and syncope. Due to patient’s risk factors, CCS Angina Class II symptoms, and abnormal Echo, patient is referred for cardiac catheterization with possible intervention.   EKG 10/30/18: Sinus rhythm, poor R wave progression, non-specific T wave abnormalities, cannot rule out inferior infarct, per MD note.  Echo 10/22/18: LVEF: 30-35%, LV systolic function severely reduced with severe global hypokinesis, Left ventricle mildly dilated, reduced RV systolic function, moderate biatrial dilation, mild MR, mild to moderate TR, Pulmonary Artery systolic pressure is at least 37 mmHg, per MD note.  Holter Monitor Report 7/21/16: Sinus rhythm with sinus arrhythmia, occasional PVCs, frequent PACs, no evidence of paroxysmal atrial fibrillation.  MRA Chest 2/17/16: Ascending Aortic root aneurysm measuring 4.1 cm. 52 y/o M, former Smoker (quit 2015 1/2 PPD x 20 years), with PMHx of HTN, HLD, Asthma (two prior hospitalizations, denies intubation), Ascending Aortic aneurysm, paroxysmal A Fib (currently in sinus, no on Coumadin, patient reports it was in the setting of a URI related Asthma exacerbation ~10 years ago), newly diagnosed Systolic HF (LVEF 30-35% on 10/27/18), and new onset of Atrial Flutter (s/p direct cardioversion with one week course of Amiodarone as per patient and Eloquis last dose 11/4/18) diagnosed on admission to [facility] for Pneumonia 10/21-10/27/2018 with abnormal Echo 10/22/18: LVEF: 30-35%, LV systolic function severely reduced with severe global hypokinesis, Left ventricle mildly dilated, reduced RV systolic function, moderate biatrial dilation, mild MR, mild to moderate TR, Pulmonary Artery systolic pressure is at least 37 mmHg, per MD note. Patient subsequently presented to his Cardiologist, Dr. Robin Gaona, for follow up after c/o residual exertional dyspnea when going up flights of stairs and denying any associated chest pain, palpitations, dizziness, diaphoresis, palpitations, fatigue, LE edema, orthopnea, PND, and syncope. Due to patient’s risk factors, CCS Angina Class II symptoms, and abnormal Echo, patient is referred for cardiac catheterization with possible intervention.   EKG 10/30/18: Sinus rhythm, poor R wave progression, non-specific T wave abnormalities, cannot rule out inferior infarct, per MD note.  Echo 10/22/18: LVEF: 30-35%, LV systolic function severely reduced with severe global hypokinesis, Left ventricle mildly dilated, reduced RV systolic function, moderate biatrial dilation, mild MR, mild to moderate TR, Pulmonary Artery systolic pressure is at least 37 mmHg, per MD note.  Holter Monitor Report 7/21/16: Sinus rhythm with sinus arrhythmia, occasional PVCs, frequent PACs, no evidence of paroxysmal atrial fibrillation.  MRA Chest 2/17/16: Ascending Aortic root aneurysm measuring 4.1 cm. 54 y/o M, former Smoker (quit 2015 1/2 PPD x 20 years), with a FHx of CAD and TIA and PMHx of HTN, HLD, Asthma (two prior hospitalizations, denies intubation), Ascending Aortic aneurysm, paroxysmal A Fib (currently in sinus, no on Coumadin, patient reports it was in the setting of a URI related Asthma exacerbation ~10 years ago), newly diagnosed Systolic HF (LVEF 30-35% on 10/27/18), and new onset of Atrial Flutter (s/p direct cardioversion with one week course of Amiodarone as per patient and Eloquis last dose 11/4/18) diagnosed on admission to [facility] for Pneumonia 10/21-10/27/2018 with abnormal Echo 10/22/18: LVEF: 30-35%, LV systolic function severely reduced with severe global hypokinesis, Left ventricle mildly dilated, reduced RV systolic function, moderate biatrial dilation, mild MR, mild to moderate TR, Pulmonary Artery systolic pressure is at least 37 mmHg, per MD note. Patient subsequently presented to his Cardiologist, Dr. Robin Gaona, for follow up after c/o residual exertional dyspnea when going up flights of stairs and denying any associated chest pain, palpitations, dizziness, diaphoresis, palpitations, fatigue, LE edema, orthopnea, PND, and syncope. Due to patient’s risk factors, CCS Angina Class II symptoms, and abnormal Echo, patient is referred for cardiac catheterization with possible intervention.   EKG 10/30/18: Sinus rhythm, poor R wave progression, non-specific T wave abnormalities, cannot rule out inferior infarct, per MD note.  Echo 10/22/18: LVEF: 30-35%, LV systolic function severely reduced with severe global hypokinesis, Left ventricle mildly dilated, reduced RV systolic function, moderate biatrial dilation, mild MR, mild to moderate TR, Pulmonary Artery systolic pressure is at least 37 mmHg, per MD note.  Holter Monitor Report 7/21/16: Sinus rhythm with sinus arrhythmia, occasional PVCs, frequent PACs, no evidence of paroxysmal atrial fibrillation.  MRA Chest 2/17/16: Ascending Aortic root aneurysm measuring 4.1 cm.

## 2018-11-06 NOTE — H&P ADULT - PMH
Acute systolic heart failure    Ascending aortic aneurysm    Asthma    Atrial flutter, unspecified type    HLD (hyperlipidemia)    HTN (hypertension)

## 2018-11-06 NOTE — H&P ADULT - ASSESSMENT
54 y/o M, former Smoker with PMHx of HTN, HLD, Asthma (two prior hospitalizations, denies intubation), Ascending Aortic aneurysm, paroxysmal A Fib, newly diagnosed Systolic HF (LVEF 30-35% on 10/27/18), and  new onset of Atrial Flutter on recent hospital admission presents for Right and Left Cardiac Catheterization with possible intervention in light of Abnormal Echo, CCS Angina Class II Symptoms, and patient's risk factors.    H/H 16.7/49, patient denies bleeding, GI bleeding, hematemesis, hematuria, BRBPR or melena and reports last dose of Eloquis 11/4/18. Ordered Aspirin 325mg as Discussed with Dr. Alexander.    Cr. 0.95, lungs clear, IV 9% NS @ 50cc/hr pre-Cath with frequent lung checks.    OF NOTE: elevated lipid panels this AM, patient denies prior diagnosis of HLD or being prescribed any medications for it.    CCS Angina Class II    Mallampati Class III    Risks & benefits of procedure and alternative therapy have been explained to the patient including but not limited to: allergic reaction, bleeding w/possible need for blood transfusion, infection, renal and vascular compromise, limb damage, arrhythmia, stroke, vessel dissection/perforation, Myocardial infarction, emergent CABG. Informed consent obtained and in chart.    Patient a candidate for sedation: Yes    Sedation Type: Moderate 54 y/o M, former Smoker with a FHx of CAD and TIA and PMHx of HTN, HLD, Asthma (two prior hospitalizations, denies intubation), Ascending Aortic aneurysm, paroxysmal A Fib, newly diagnosed Systolic HF (LVEF 30-35% on 10/27/18), and  new onset of Atrial Flutter on recent hospital admission presents for Right and Left Cardiac Catheterization with possible intervention in light of Abnormal Echo, CCS Angina Class II Symptoms, and patient's risk factors.    H/H 16.7/49, patient denies bleeding, GI bleeding, hematemesis, hematuria, BRBPR or melena and reports last dose of Eloquis 11/4/18. Ordered Aspirin 325mg as Discussed with Dr. Alexander.    Cr. 0.95, lungs clear, IV 9% NS @ 50cc/hr pre-Cath with frequent lung checks.    OF NOTE: elevated lipid panels this AM, patient denies prior diagnosis of HLD or being prescribed any medications for it.    CCS Angina Class II    Mallampati Class III    Risks & benefits of procedure and alternative therapy have been explained to the patient including but not limited to: allergic reaction, bleeding w/possible need for blood transfusion, infection, renal and vascular compromise, limb damage, arrhythmia, stroke, vessel dissection/perforation, Myocardial infarction, emergent CABG. Informed consent obtained and in chart.    Patient a candidate for sedation: Yes    Sedation Type: Moderate

## 2018-11-06 NOTE — H&P ADULT - FAMILY HISTORY
No pertinent family history in first degree relatives Father  Still living? Unknown  Family history of coronary artery disease, Age at diagnosis: Age Unknown  Family history of TIAs, Age at diagnosis: Age Unknown     Mother  Still living? Unknown  Family history of coronary artery disease, Age at diagnosis: Age Unknown

## 2018-11-06 NOTE — H&P ADULT - NSHPLABSRESULTS_GEN_ALL_CORE
16.7   7.7   )-----------( 307      ( 07 Nov 2018 07:53 )             49.0     137  |  95<L>  |  18  ----------------------------<  102<H>  4.6   |  28  |  0.95    Ca    9.5      07 Nov 2018 07:53    TPro  7.0  /  Alb  4.3  /  TBili  0.6  /  DBili  x   /  AST  20  /  ALT  27  /  AlkPhos  41  11-07    PT/INR - ( 07 Nov 2018 07:53 )   PT: 10.1 sec;   INR: 0.90       PTT - ( 07 Nov 2018 07:53 )  PTT:28.6 sec    EKG: NSR, Q Waves noted in Lead III and aVF, no ST elevations or T wave inversions noted.

## 2018-11-07 ENCOUNTER — OUTPATIENT (OUTPATIENT)
Dept: OUTPATIENT SERVICES | Facility: HOSPITAL | Age: 53
LOS: 1 days | Discharge: NURSING, REHAB OR LONG-TERM CARE | End: 2018-11-07
Payer: COMMERCIAL

## 2018-11-07 DIAGNOSIS — Z98.890 OTHER SPECIFIED POSTPROCEDURAL STATES: Chronic | ICD-10-CM

## 2018-11-07 LAB
ALBUMIN SERPL ELPH-MCNC: 4.3 G/DL — SIGNIFICANT CHANGE UP (ref 3.3–5)
ALP SERPL-CCNC: 41 U/L — SIGNIFICANT CHANGE UP (ref 40–120)
ALT FLD-CCNC: 27 U/L — SIGNIFICANT CHANGE UP (ref 10–45)
ANION GAP SERPL CALC-SCNC: 14 MMOL/L — SIGNIFICANT CHANGE UP (ref 5–17)
APTT BLD: 28.6 SEC — SIGNIFICANT CHANGE UP (ref 27.5–36.3)
AST SERPL-CCNC: 20 U/L — SIGNIFICANT CHANGE UP (ref 10–40)
BASOPHILS NFR BLD AUTO: 1.4 % — SIGNIFICANT CHANGE UP (ref 0–2)
BILIRUB SERPL-MCNC: 0.6 MG/DL — SIGNIFICANT CHANGE UP (ref 0.2–1.2)
BUN SERPL-MCNC: 18 MG/DL — SIGNIFICANT CHANGE UP (ref 7–23)
CALCIUM SERPL-MCNC: 9.5 MG/DL — SIGNIFICANT CHANGE UP (ref 8.4–10.5)
CHLORIDE SERPL-SCNC: 95 MMOL/L — LOW (ref 96–108)
CHOLEST SERPL-MCNC: 264 MG/DL — HIGH (ref 10–199)
CO2 SERPL-SCNC: 28 MMOL/L — SIGNIFICANT CHANGE UP (ref 22–31)
CREAT SERPL-MCNC: 0.95 MG/DL — SIGNIFICANT CHANGE UP (ref 0.5–1.3)
CRP SERPL-MCNC: 0.11 MG/DL — SIGNIFICANT CHANGE UP (ref 0–0.4)
EOSINOPHIL NFR BLD AUTO: 2.7 % — SIGNIFICANT CHANGE UP (ref 0–6)
GLUCOSE SERPL-MCNC: 102 MG/DL — HIGH (ref 70–99)
HBA1C BLD-MCNC: 5.8 % — HIGH (ref 4–5.6)
HCT VFR BLD CALC: 49 % — SIGNIFICANT CHANGE UP (ref 39–50)
HDLC SERPL-MCNC: 80 MG/DL — SIGNIFICANT CHANGE UP
HGB BLD-MCNC: 16.7 G/DL — SIGNIFICANT CHANGE UP (ref 13–17)
INR BLD: 0.9 — SIGNIFICANT CHANGE UP (ref 0.88–1.16)
LIPID PNL WITH DIRECT LDL SERPL: 153 MG/DL — HIGH
LYMPHOCYTES # BLD AUTO: 29.5 % — SIGNIFICANT CHANGE UP (ref 13–44)
MCHC RBC-ENTMCNC: 31.1 PG — SIGNIFICANT CHANGE UP (ref 27–34)
MCHC RBC-ENTMCNC: 34.1 G/DL — SIGNIFICANT CHANGE UP (ref 32–36)
MCV RBC AUTO: 91.2 FL — SIGNIFICANT CHANGE UP (ref 80–100)
MONOCYTES NFR BLD AUTO: 11.9 % — SIGNIFICANT CHANGE UP (ref 2–14)
NEUTROPHILS NFR BLD AUTO: 54.5 % — SIGNIFICANT CHANGE UP (ref 43–77)
PLATELET # BLD AUTO: 307 K/UL — SIGNIFICANT CHANGE UP (ref 150–400)
POTASSIUM SERPL-MCNC: 4.6 MMOL/L — SIGNIFICANT CHANGE UP (ref 3.5–5.3)
POTASSIUM SERPL-SCNC: 4.6 MMOL/L — SIGNIFICANT CHANGE UP (ref 3.5–5.3)
PROT SERPL-MCNC: 7 G/DL — SIGNIFICANT CHANGE UP (ref 6–8.3)
PROTHROM AB SERPL-ACNC: 10.1 SEC — SIGNIFICANT CHANGE UP (ref 10–12.9)
RBC # BLD: 5.37 M/UL — SIGNIFICANT CHANGE UP (ref 4.2–5.8)
RBC # FLD: 13.2 % — SIGNIFICANT CHANGE UP (ref 10.3–16.9)
SODIUM SERPL-SCNC: 137 MMOL/L — SIGNIFICANT CHANGE UP (ref 135–145)
TOTAL CHOLESTEROL/HDL RATIO MEASUREMENT: 3.3 RATIO — LOW (ref 3.4–9.6)
TRIGL SERPL-MCNC: 155 MG/DL — HIGH (ref 10–149)
WBC # BLD: 7.7 K/UL — SIGNIFICANT CHANGE UP (ref 3.8–10.5)
WBC # FLD AUTO: 7.7 K/UL — SIGNIFICANT CHANGE UP (ref 3.8–10.5)

## 2018-11-07 PROCEDURE — 83036 HEMOGLOBIN GLYCOSYLATED A1C: CPT

## 2018-11-07 PROCEDURE — C1887: CPT

## 2018-11-07 PROCEDURE — 82550 ASSAY OF CK (CPK): CPT

## 2018-11-07 PROCEDURE — 93010 ELECTROCARDIOGRAM REPORT: CPT

## 2018-11-07 PROCEDURE — 93460 R&L HRT ART/VENTRICLE ANGIO: CPT | Mod: 26

## 2018-11-07 PROCEDURE — 86140 C-REACTIVE PROTEIN: CPT

## 2018-11-07 PROCEDURE — C1769: CPT

## 2018-11-07 PROCEDURE — C1894: CPT

## 2018-11-07 PROCEDURE — 85025 COMPLETE CBC W/AUTO DIFF WBC: CPT

## 2018-11-07 PROCEDURE — 82553 CREATINE MB FRACTION: CPT

## 2018-11-07 PROCEDURE — 80061 LIPID PANEL: CPT

## 2018-11-07 PROCEDURE — 85610 PROTHROMBIN TIME: CPT

## 2018-11-07 PROCEDURE — 93005 ELECTROCARDIOGRAM TRACING: CPT

## 2018-11-07 PROCEDURE — C1889: CPT

## 2018-11-07 PROCEDURE — 93460 R&L HRT ART/VENTRICLE ANGIO: CPT

## 2018-11-07 PROCEDURE — 80053 COMPREHEN METABOLIC PANEL: CPT

## 2018-11-07 PROCEDURE — 85730 THROMBOPLASTIN TIME PARTIAL: CPT

## 2018-11-07 RX ORDER — SODIUM CHLORIDE 9 MG/ML
500 INJECTION INTRAMUSCULAR; INTRAVENOUS; SUBCUTANEOUS
Qty: 0 | Refills: 0 | Status: DISCONTINUED | OUTPATIENT
Start: 2018-11-07 | End: 2018-11-07

## 2018-11-07 RX ORDER — IPRATROPIUM BROMIDE 0.2 MG/ML
0 SOLUTION, NON-ORAL INHALATION
Qty: 0 | Refills: 0 | COMMUNITY

## 2018-11-07 RX ORDER — APIXABAN 2.5 MG/1
0 TABLET, FILM COATED ORAL
Qty: 0 | Refills: 0 | COMMUNITY

## 2018-11-07 RX ORDER — ASPIRIN/CALCIUM CARB/MAGNESIUM 324 MG
325 TABLET ORAL ONCE
Qty: 0 | Refills: 0 | Status: COMPLETED | OUTPATIENT
Start: 2018-11-07 | End: 2018-11-07

## 2018-11-07 RX ORDER — BUDESONIDE AND FORMOTEROL FUMARATE DIHYDRATE 160; 4.5 UG/1; UG/1
0 AEROSOL RESPIRATORY (INHALATION)
Qty: 0 | Refills: 0 | COMMUNITY

## 2018-11-07 RX ORDER — ASPIRIN/CALCIUM CARB/MAGNESIUM 324 MG
324 TABLET ORAL ONCE
Qty: 0 | Refills: 0 | Status: DISCONTINUED | OUTPATIENT
Start: 2018-11-07 | End: 2018-11-07

## 2018-11-07 RX ORDER — SODIUM CHLORIDE 9 MG/ML
1000 INJECTION INTRAMUSCULAR; INTRAVENOUS; SUBCUTANEOUS
Qty: 0 | Refills: 0 | Status: DISCONTINUED | OUTPATIENT
Start: 2018-11-07 | End: 2018-11-07

## 2018-11-07 RX ORDER — CARVEDILOL PHOSPHATE 80 MG/1
1 CAPSULE, EXTENDED RELEASE ORAL
Qty: 0 | Refills: 0 | COMMUNITY

## 2018-11-07 RX ORDER — CLOPIDOGREL BISULFATE 75 MG/1
600 TABLET, FILM COATED ORAL ONCE
Qty: 0 | Refills: 0 | Status: DISCONTINUED | OUTPATIENT
Start: 2018-11-07 | End: 2018-11-07

## 2018-11-07 RX ORDER — SACUBITRIL AND VALSARTAN 24; 26 MG/1; MG/1
1 TABLET, FILM COATED ORAL
Qty: 0 | Refills: 0 | COMMUNITY

## 2018-11-07 RX ORDER — BUDESONIDE AND FORMOTEROL FUMARATE DIHYDRATE 160; 4.5 UG/1; UG/1
2 AEROSOL RESPIRATORY (INHALATION)
Qty: 0 | Refills: 0 | COMMUNITY

## 2018-11-07 RX ADMIN — Medication 325 MILLIGRAM(S): at 08:57

## 2018-11-07 RX ADMIN — SODIUM CHLORIDE 50 MILLILITER(S): 9 INJECTION INTRAMUSCULAR; INTRAVENOUS; SUBCUTANEOUS at 08:58

## 2018-11-07 NOTE — PROGRESS NOTE ADULT - SUBJECTIVE AND OBJECTIVE BOX
Interventional Cardiology PA SDA Discharge Note    Patient without complaints. Ambulated and voided without difficulties    Afebrile, VSS    Ext: Right Radial and Brachial vein: No hematoma, no bleeding, dressing; C/D/I      Pulses: 2+ intact RAD/DP/PT to baseline     A/P:  52 y/o M, former Smoker with a FHx of CAD and TIA and PMHx of HTN, HLD, Asthma (two prior hospitalizations, denies intubation), Ascending Aortic aneurysm, paroxysmal A Fib, newly diagnosed Systolic HF (LVEF 30-35% on 10/27/18), and  new onset of Atrial Flutter on recent hospital admission presents for Right and Left Cardiac Catheterization with possible intervention in light of Abnormal Echo, CCS Angina Class II Symptoms, and patient's risk factors. Pt is now s/p dx R+L heart cath on 11/7/18: non-obstructive CAD with luminal irregularities seen in all vessels. High output cardiomyopathy, EF 40%, global hypokinesis, no AS, 1+ MR. RA 3, RV 27/3, PA 27/6, PCWP 6, CO 7, CI 3.7, SVC Sat 79%, PA Sat 76%. Pt was recommended to continue medical therapy and f/u with Dr. Gaona within 1-2 weeks.      1.	Stable for discharge as per attending Dr. Alexander after bed rest, pt voids, wrist stable and 30 minutes of ambulation.  2.	Follow-up with PMD/Cardiologist Dr. Gaona in 1-2 weeks  3.	Discharged forms signed and copies in chart

## 2018-11-15 ENCOUNTER — MESSAGE (OUTPATIENT)
Age: 53
End: 2018-11-15

## 2018-11-15 PROBLEM — I50.21 ACUTE SYSTOLIC (CONGESTIVE) HEART FAILURE: Chronic | Status: ACTIVE | Noted: 2018-11-06

## 2018-11-15 PROBLEM — I48.92 UNSPECIFIED ATRIAL FLUTTER: Chronic | Status: ACTIVE | Noted: 2018-11-06

## 2018-11-15 PROBLEM — I10 ESSENTIAL (PRIMARY) HYPERTENSION: Chronic | Status: ACTIVE | Noted: 2018-11-06

## 2018-11-15 PROBLEM — E78.5 HYPERLIPIDEMIA, UNSPECIFIED: Chronic | Status: ACTIVE | Noted: 2018-11-06

## 2018-11-15 PROBLEM — I71.2 THORACIC AORTIC ANEURYSM, WITHOUT RUPTURE: Chronic | Status: ACTIVE | Noted: 2018-11-06

## 2018-11-15 PROBLEM — J45.909 UNSPECIFIED ASTHMA, UNCOMPLICATED: Chronic | Status: ACTIVE | Noted: 2018-11-06

## 2018-11-19 ENCOUNTER — APPOINTMENT (OUTPATIENT)
Dept: CARDIOLOGY | Facility: CLINIC | Age: 53
End: 2018-11-19

## 2018-11-19 ENCOUNTER — NON-APPOINTMENT (OUTPATIENT)
Age: 53
End: 2018-11-19

## 2018-11-19 VITALS
OXYGEN SATURATION: 97 % | BODY MASS INDEX: 29.34 KG/M2 | RESPIRATION RATE: 12 BRPM | DIASTOLIC BLOOD PRESSURE: 97 MMHG | HEART RATE: 79 BPM | SYSTOLIC BLOOD PRESSURE: 130 MMHG | WEIGHT: 241 LBS

## 2018-11-19 RX ORDER — CARVEDILOL 12.5 MG/1
12.5 TABLET, FILM COATED ORAL TWICE DAILY
Qty: 180 | Refills: 2 | Status: DISCONTINUED | COMMUNITY
Start: 2018-10-30 | End: 2018-11-19

## 2018-11-19 NOTE — PHYSICAL EXAM
[General Appearance - Well Developed] : well developed [General Appearance - Well Nourished] : well nourished [General Appearance - In No Acute Distress] : no acute distress [Normal Conjunctiva] : the conjunctiva exhibited no abnormalities [No Oral Cyanosis] : no oral cyanosis [] : no respiratory distress [Respiration, Rhythm And Depth] : normal respiratory rhythm and effort [Auscultation Breath Sounds / Voice Sounds] : lungs were clear to auscultation bilaterally [Bowel Sounds] : normal bowel sounds [Abnormal Walk] : normal gait [Nail Clubbing] : no clubbing of the fingernails [Cyanosis, Localized] : no localized cyanosis [Normal Rate] : normal [Rhythm Regular] : regular [Normal S1] : normal S1 [Normal S2] : normal S2 [No Murmur] : no murmurs heard [2+] : left 2+ [No Pitting Edema] : no pitting edema present [FreeTextEntry1] : No JVD present [S3] : no S3 [S4] : no S4 [Right Carotid Bruit] : no bruit heard over the right carotid [Left Carotid Bruit] : no bruit heard over the left carotid

## 2018-11-19 NOTE — REVIEW OF SYSTEMS
[Feeling Fatigued] : feeling fatigued [Dyspnea on exertion] : dyspnea during exertion [Negative] : Psychiatric [Chest Pain] : no chest pain [Lower Ext Edema] : no extremity edema [Leg Claudication] : no intermittent leg claudication [Palpitations] : no palpitations [Wheezing] : no wheezing [Coughing Up Blood] : no hemoptysis

## 2018-11-19 NOTE — REASON FOR VISIT
[Initial Evaluation] : an initial evaluation of [Heart Failure] : congestive heart failure [FreeTextEntry1] : New onset atrial flutter

## 2018-11-19 NOTE — ASSESSMENT
[FreeTextEntry1] : 54 yo male with hypertension, paroxysmal atrial flutter -> cardioversion, and acute systolic heart failure (LVEF 30-35% per 10/22/18 echo, in setting of DAWN pneumonia and rapid atrial flutter). No obstructive CAD per 11/7/18 cardiac cath with LVEF 40%. 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 \par Patient remains in sinus rhythm per ECG today. Will discontinue amiodarone.\par Will reduce carvedilol to 6.25 mg po bid to allow for higher resting BP and see if his symptoms of fatigue improve. Patient to call if his BP should become elevated.\par Patient to resume his Entresto 24 mg/26 mg po bid for now. If BP becomes low, will discontinue this medication.\par Will repeat echo to reassess LV function in 6 months while on current medical therapy.\par Continue Eliquis 5 mg po bid for now.\par \par RTC in 3-4 weeks

## 2018-11-19 NOTE — HISTORY OF PRESENT ILLNESS
[FreeTextEntry1] : 52 yo male with hypertension, paroxysmal atrial flutter, non-ischemic cardiomyopathy (EF 30-35% per 10/22/18 echo, likely due to rapid aflutter). Patient underwent cardiac cath on 11/7/18 at [facility], which demonstrated only mild luminal irregularities with LVEF 40%. Patient called on 11/15/18 to report weakness and dizziness with BP of 96/59 and pulse 72, which prevented him from returning to work. Patient was instructed to discontinue his Entresto 24-26 mg po bid at that time due to hypotension. However, patient reported elevated BP this morning and took a dose of Entreso. He continues to report intermittent fatigue with exertion. Patient denies chest pain, palpitations, syncope, edema, melena, hematochezia, or hematemesis.

## 2018-11-28 ENCOUNTER — MEDICATION RENEWAL (OUTPATIENT)
Age: 53
End: 2018-11-28

## 2018-11-29 ENCOUNTER — APPOINTMENT (OUTPATIENT)
Dept: CARDIOLOGY | Facility: CLINIC | Age: 53
End: 2018-11-29
Payer: COMMERCIAL

## 2018-11-29 ENCOUNTER — NON-APPOINTMENT (OUTPATIENT)
Age: 53
End: 2018-11-29

## 2018-11-29 PROCEDURE — 93000 ELECTROCARDIOGRAM COMPLETE: CPT

## 2018-11-29 PROCEDURE — 99214 OFFICE O/P EST MOD 30 MIN: CPT

## 2018-11-29 NOTE — HISTORY OF PRESENT ILLNESS
[FreeTextEntry1] : Patient called to be seen because since Saturday night he's had 2-3 episodes of waking up in the middle of the night with shortness of breath. He's also been having clear sputum. Usually a nebulizer treatment makes him feel better and he is able to go back to sleep\par He has had mucus since he was diagnosed with pneumonia back in October and says that the mucus used to be brown, but now it's clear. \par He also suffers from allergies and has been using a nasal spray\par He denies worsening nausea and edema, weight gain \par He denies palpitations or dizziness\par No CP

## 2018-11-29 NOTE — DISCUSSION/SUMMARY
[FreeTextEntry1] : 1.  SOB\par Patient complains of shortness of breath which has woken him up from sleep, 2-3 times since Saturday night, with clear mucus (not new), improved with nebulizer treatment\par On the other hand, he denies any weight gain, increasing LE edema\par Today, his exam is not consistent with decompensated heart failure (clear lungs, no edema)\par His weight is 238 pounds\par His symptoms are likely asthma/allergy related\par Rec:\par CXR as a precaution -> call to discuss\par Same regimen\par Daily wts\par Low salt\par Keep appointment with Dr Gaona \par \par 2.  Atrial flutter -. s/p CV\par Still in SR\par EKG as described\par Rec:\par Same meds\par

## 2018-11-29 NOTE — PHYSICAL EXAM
[General Appearance - Well Developed] : well developed [General Appearance - Well Nourished] : well nourished [Normal Conjunctiva] : the conjunctiva exhibited no abnormalities [Auscultation Breath Sounds / Voice Sounds] : lungs were clear to auscultation bilaterally [Heart Rate And Rhythm] : heart rate and rhythm were normal [Heart Sounds] : normal S1 and S2 [Murmurs] : no murmurs present [Edema] : no peripheral edema present [Bowel Sounds] : normal bowel sounds [Abdomen Soft] : soft [Abdomen Tenderness] : non-tender [Abnormal Walk] : normal gait [Nail Clubbing] : no clubbing of the fingernails [Skin Color & Pigmentation] : normal skin color and pigmentation [Oriented To Time, Place, And Person] : oriented to person, place, and time [FreeTextEntry1] : Np JVD or bruits

## 2018-11-29 NOTE — REASON FOR VISIT
[Follow-Up - Clinic] : a clinic follow-up of [Dyspnea] : dyspnea [Atrial Fibrillation] : atrial fibrillation

## 2018-11-30 ENCOUNTER — RX RENEWAL (OUTPATIENT)
Age: 53
End: 2018-11-30

## 2018-12-01 ENCOUNTER — RESULT REVIEW (OUTPATIENT)
Age: 53
End: 2018-12-01

## 2018-12-04 ENCOUNTER — APPOINTMENT (OUTPATIENT)
Dept: CARDIOLOGY | Facility: CLINIC | Age: 53
End: 2018-12-04

## 2018-12-11 ENCOUNTER — APPOINTMENT (OUTPATIENT)
Dept: CARDIOLOGY | Facility: CLINIC | Age: 53
End: 2018-12-11
Payer: COMMERCIAL

## 2018-12-11 ENCOUNTER — NON-APPOINTMENT (OUTPATIENT)
Age: 53
End: 2018-12-11

## 2018-12-11 VITALS
BODY MASS INDEX: 28.62 KG/M2 | DIASTOLIC BLOOD PRESSURE: 86 MMHG | OXYGEN SATURATION: 95 % | SYSTOLIC BLOOD PRESSURE: 158 MMHG | RESPIRATION RATE: 14 BRPM | TEMPERATURE: 97.8 F | HEART RATE: 64 BPM | HEIGHT: 76 IN | WEIGHT: 235 LBS

## 2018-12-11 PROCEDURE — 93000 ELECTROCARDIOGRAM COMPLETE: CPT

## 2018-12-11 PROCEDURE — 99213 OFFICE O/P EST LOW 20 MIN: CPT

## 2018-12-11 RX ORDER — PREDNISONE 20 MG/1
20 TABLET ORAL DAILY
Qty: 10 | Refills: 0 | Status: ACTIVE | COMMUNITY
Start: 2018-12-11 | End: 1900-01-01

## 2018-12-11 RX ORDER — ALBUTEROL SULFATE 1.25 MG/3ML
1.25 SOLUTION RESPIRATORY (INHALATION)
Qty: 30 | Refills: 2 | Status: ACTIVE | COMMUNITY
Start: 2018-12-11 | End: 1900-01-01

## 2018-12-12 ENCOUNTER — APPOINTMENT (OUTPATIENT)
Dept: INTERNAL MEDICINE | Facility: CLINIC | Age: 53
End: 2018-12-12
Payer: COMMERCIAL

## 2018-12-12 ENCOUNTER — NON-APPOINTMENT (OUTPATIENT)
Age: 53
End: 2018-12-12

## 2018-12-12 VITALS
SYSTOLIC BLOOD PRESSURE: 140 MMHG | HEIGHT: 76 IN | WEIGHT: 235 LBS | BODY MASS INDEX: 28.62 KG/M2 | TEMPERATURE: 98.3 F | HEART RATE: 93 BPM | OXYGEN SATURATION: 9 % | DIASTOLIC BLOOD PRESSURE: 95 MMHG

## 2018-12-12 PROCEDURE — 99204 OFFICE O/P NEW MOD 45 MIN: CPT | Mod: 25

## 2018-12-12 PROCEDURE — 94010 BREATHING CAPACITY TEST: CPT

## 2018-12-12 RX ORDER — IPRATROPIUM BROMIDE AND ALBUTEROL SULFATE 2.5; .5 MG/3ML; MG/3ML
0.5-2.5 (3) SOLUTION RESPIRATORY (INHALATION)
Qty: 120 | Refills: 5 | Status: ACTIVE | COMMUNITY
Start: 2018-12-12 | End: 1900-01-01

## 2018-12-12 NOTE — PHYSICAL EXAM

## 2018-12-12 NOTE — HISTORY OF PRESENT ILLNESS
[FreeTextEntry1] : Evaluation for asthma. [de-identified] : This is a 53-year-old male who formally smoked less than one half pack per day but quit 3 years ago. He gives a history of asthma diagnosed about 12 years ago. He was hospitalized 10 years ago with a serious asthma attack and required ICU care. He was not intubated. He's been maintained on Advair or Symbicort doing well over the past several years. History dizzy usually change in temperatures such and since the springtime and exposure to wood smoke. However it again generally his asthma has been well controlled. He did have an episode of a left upper lobe pneumonia treated at [facility] in October. During that time he was also told of rapid atrial flutter. The patient underwent a cardiac catheterization at [facility] about 3 weeks ago. This revealed nonobstructive coronary disease. The ejection fraction was 40%. The patient is followed by cardiology. He was doing well until about 10 days ago. He then developed shortness of breath, chest tightness, wheezing and a dry cough. There was no obvious URI type symptoms. No fever or chills. The patient was placed on prednisone 40 mg daily yesterday because of shortness of breath. Patient has been using albuterol nebulizer about every 4 hours. This morning he was very tight and required nebulizer twice.

## 2018-12-12 NOTE — ASSESSMENT
[FreeTextEntry1] : Spirometry was performed reveals a severe obstructive ventilatory impairment with an FEV1 of 50% of predicted and an FVC of 70% of predicted. This patient is clearly having an asthmatic flare although the etiology is somewhat unclear. I will increase prednisone to 60 mg daily for 3 days then 40x3 days then 30x3 days then 20x3 days then 10x3 days. He is to continue with Symbicort and DuoNeb solution on a p.r.n. basis. He will call me tomorrow to let me know how he is feeling. He's been instructed that if he feels worse he should go directly to the emergency room. I will follow up the patient tomorrow.

## 2018-12-12 NOTE — REVIEW OF SYSTEMS
[Wheezing] : wheezing [Cough] : cough [Dyspnea on Exertion] : dyspnea on exertion [Negative] : Heme/Lymph

## 2018-12-21 ENCOUNTER — RECORD ABSTRACTING (OUTPATIENT)
Age: 53
End: 2018-12-21

## 2018-12-21 DIAGNOSIS — G47.30 SLEEP APNEA, UNSPECIFIED: ICD-10-CM

## 2018-12-21 DIAGNOSIS — Z86.79 PERSONAL HISTORY OF OTHER DISEASES OF THE CIRCULATORY SYSTEM: ICD-10-CM

## 2018-12-21 DIAGNOSIS — I71.9 AORTIC ANEURYSM OF UNSPECIFIED SITE, W/OUT RUPTURE: ICD-10-CM

## 2018-12-21 DIAGNOSIS — E79.0 HYPERURICEMIA W/OUT SIGNS OF INFLAMMATORY ARTHRITIS AND TOPHACEOUS DISEASE: ICD-10-CM

## 2018-12-21 DIAGNOSIS — Z82.49 FAMILY HISTORY OF ISCHEMIC HEART DISEASE AND OTHER DISEASES OF THE CIRCULATORY SYSTEM: ICD-10-CM

## 2018-12-21 RX ORDER — ASPIRIN 325 MG/1
325 TABLET, FILM COATED ORAL DAILY
Refills: 0 | Status: ACTIVE | COMMUNITY

## 2018-12-24 ENCOUNTER — APPOINTMENT (OUTPATIENT)
Dept: FAMILY MEDICINE | Facility: CLINIC | Age: 53
End: 2018-12-24
Payer: COMMERCIAL

## 2018-12-24 VITALS
HEIGHT: 76 IN | DIASTOLIC BLOOD PRESSURE: 96 MMHG | TEMPERATURE: 98.2 F | WEIGHT: 235 LBS | BODY MASS INDEX: 28.62 KG/M2 | HEART RATE: 88 BPM | SYSTOLIC BLOOD PRESSURE: 132 MMHG

## 2018-12-24 VITALS — SYSTOLIC BLOOD PRESSURE: 120 MMHG | DIASTOLIC BLOOD PRESSURE: 90 MMHG

## 2018-12-24 DIAGNOSIS — Z00.00 ENCOUNTER FOR GENERAL ADULT MEDICAL EXAMINATION W/OUT ABNORMAL FINDINGS: ICD-10-CM

## 2018-12-24 DIAGNOSIS — E66.9 OBESITY, UNSPECIFIED: ICD-10-CM

## 2018-12-24 DIAGNOSIS — J45.30 MILD PERSISTENT ASTHMA, UNCOMPLICATED: ICD-10-CM

## 2018-12-24 PROCEDURE — 99396 PREV VISIT EST AGE 40-64: CPT | Mod: 25

## 2018-12-24 PROCEDURE — 36415 COLL VENOUS BLD VENIPUNCTURE: CPT

## 2018-12-24 PROCEDURE — 90732 PPSV23 VACC 2 YRS+ SUBQ/IM: CPT

## 2018-12-24 PROCEDURE — G0009: CPT

## 2018-12-24 RX ORDER — ALBUTEROL SULFATE 90 UG/1
108 (90 BASE) AEROSOL, METERED RESPIRATORY (INHALATION)
Qty: 1 | Refills: 3 | Status: ACTIVE | COMMUNITY
Start: 1900-01-01 | End: 1900-01-01

## 2018-12-24 RX ORDER — SACUBITRIL AND VALSARTAN 24; 26 MG/1; MG/1
24-26 TABLET, FILM COATED ORAL TWICE DAILY
Qty: 180 | Refills: 3 | Status: ACTIVE | COMMUNITY
Start: 2018-11-30 | End: 1900-01-01

## 2018-12-24 NOTE — PHYSICAL EXAM
[No Acute Distress] : no acute distress [Well-Appearing] : well-appearing [Normal Sclera/Conjunctiva] : normal sclera/conjunctiva [PERRL] : pupils equal round and reactive to light [EOMI] : extraocular movements intact [Normal Outer Ear/Nose] : the outer ears and nose were normal in appearance [Normal Oropharynx] : the oropharynx was normal [Normal TMs] : both tympanic membranes were normal [No Lymphadenopathy] : no lymphadenopathy [Thyroid Normal, No Nodules] : the thyroid was normal and there were no nodules present [Clear to Auscultation] : lungs were clear to auscultation bilaterally [Normal Rate] : normal rate  [Regular Rhythm] : with a regular rhythm [Normal S1, S2] : normal S1 and S2 [No Murmur] : no murmur heard [Pedal Pulses Present] : the pedal pulses are present [No Edema] : there was no peripheral edema [No Extremity Clubbing/Cyanosis] : no extremity clubbing/cyanosis [Soft] : abdomen soft [Non Tender] : non-tender [Non-distended] : non-distended [No Masses] : no abdominal mass palpated [No HSM] : no HSM [Normal Bowel Sounds] : normal bowel sounds [Normal Posterior Cervical Nodes] : no posterior cervical lymphadenopathy [Normal Anterior Cervical Nodes] : no anterior cervical lymphadenopathy [No Spinal Tenderness] : no spinal tenderness [Grossly Normal Strength/Tone] : grossly normal strength/tone [No Rash] : no rash [Normal Gait] : normal gait [Coordination Grossly Intact] : coordination grossly intact [Normal Affect] : the affect was normal [Normal Mood] : the mood was normal [de-identified] : Overweight male

## 2018-12-24 NOTE — HEALTH RISK ASSESSMENT
[Good] : ~his/her~  mood as  good [No falls in past year] : Patient reported no falls in the past year [0] : 2) Feeling down, depressed, or hopeless: Not at all (0) [HIV test declined] : HIV test declined [None] : None [Alone] : lives alone [Employed] : employed [College] : College [Single] : single [Feels Safe at Home] : Feels safe at home [Fully functional (bathing, dressing, toileting, transferring, walking, feeding)] : Fully functional (bathing, dressing, toileting, transferring, walking, feeding) [Fully functional (using the telephone, shopping, preparing meals, housekeeping, doing laundry, using] : Fully functional and needs no help or supervision to perform IADLs (using the telephone, shopping, preparing meals, housekeeping, doing laundry, using transportation, managing medications and managing finances) [Patient declined discussion] : Patient declined discussion [] : No [de-identified] : Dr Gaona ( cardiac cath- normal) [[id number]] : 0 [Change in mental status noted] : No change in mental status noted [Sexually Active] : not sexually active [High Risk Behavior] : no high risk behavior [Reports changes in hearing] : Reports no changes in hearing [Reports changes in vision] : Reports no changes in vision [Reports changes in dental health] : Reports no changes in dental health [HepatitisCDate] : 04/16 [HepatitisCComments] : neg [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

## 2018-12-24 NOTE — HISTORY OF PRESENT ILLNESS
[FreeTextEntry1] : Yearly physical examination [de-identified] : 52 y/o male presents for a routine physical examination. Feeling well. Taking prednisone for recent flare of his asthma. Had f/u CXR for pneumonia 8 weeks ago- no inflitrates seen. Had a normal cardiac cath last month.

## 2018-12-24 NOTE — PLAN
[FreeTextEntry1] : Continue medications as directed,\par Weight loss through diet and exercise. \par Encouraged patient to get his colonoscopy completed.

## 2018-12-24 NOTE — REVIEW OF SYSTEMS
[Negative] : Psychiatric [FreeTextEntry6] : Breathing has improved on prednisone. [de-identified] : c/o easy bleeding on Eliquis BID.

## 2018-12-27 ENCOUNTER — RX RENEWAL (OUTPATIENT)
Age: 53
End: 2018-12-27

## 2018-12-27 LAB
HBA1C MFR BLD HPLC: 6 %
PSA SERPL-MCNC: 0.6 NG/ML

## 2019-01-04 ENCOUNTER — RX RENEWAL (OUTPATIENT)
Age: 54
End: 2019-01-04

## 2019-01-07 ENCOUNTER — MEDICATION RENEWAL (OUTPATIENT)
Age: 54
End: 2019-01-07

## 2019-01-07 RX ORDER — ALBUTEROL SULFATE 2.5 MG/3ML
(2.5 MG/3ML) SOLUTION RESPIRATORY (INHALATION)
Qty: 3 | Refills: 1 | Status: ACTIVE | COMMUNITY
Start: 2019-01-07 | End: 1900-01-01

## 2019-01-08 ENCOUNTER — NON-APPOINTMENT (OUTPATIENT)
Age: 54
End: 2019-01-08

## 2019-01-08 ENCOUNTER — APPOINTMENT (OUTPATIENT)
Dept: INTERNAL MEDICINE | Facility: CLINIC | Age: 54
End: 2019-01-08
Payer: COMMERCIAL

## 2019-01-08 VITALS
SYSTOLIC BLOOD PRESSURE: 130 MMHG | OXYGEN SATURATION: 98 % | HEART RATE: 89 BPM | DIASTOLIC BLOOD PRESSURE: 90 MMHG | HEIGHT: 76 IN | BODY MASS INDEX: 28.62 KG/M2 | WEIGHT: 235 LBS

## 2019-01-08 PROCEDURE — 99214 OFFICE O/P EST MOD 30 MIN: CPT | Mod: 25

## 2019-01-08 PROCEDURE — 94010 BREATHING CAPACITY TEST: CPT

## 2019-01-08 NOTE — ASSESSMENT
[FreeTextEntry1] : Spirometry was performed and reveals a severe obstructive ventilatory impairment with an FEV1 of 47% of predicted and an FVC of 73% of predicted. This patient appears to have chronic airway obstruction. He may have a combination of COPD-asthma over lapse syndrome. I am recommending patient monitor peak flow at home twice daily. I am also recommending an allergy evaluation. He is to continue current therapy with Symbicort and p.r.n. DuoNeb treatments. He will call me in a week to let me know how when he is doing.

## 2019-01-08 NOTE — PHYSICAL EXAM

## 2019-01-08 NOTE — HISTORY OF PRESENT ILLNESS
[FreeTextEntry1] : Followup chronic asthma [de-identified] : Patient completed a steroid taper approximately one week ago. He was feeling better. Over the past few days he's feeling slightly tighter at times. He does not feel he is at his baseline. He remains on Symbicort and p.r.n. DuoNeb treatments. He's been off steroids for about one week. He does not monitor his peak flow at home. He denies history of known allergens although he has never seen an allergist. Patient is a former smoker of a half pack per day and quit 3 years ago.

## 2019-01-09 ENCOUNTER — TRANSCRIPTION ENCOUNTER (OUTPATIENT)
Age: 54
End: 2019-01-09

## 2019-01-09 ENCOUNTER — APPOINTMENT (OUTPATIENT)
Dept: FAMILY MEDICINE | Facility: CLINIC | Age: 54
End: 2019-01-09
Payer: COMMERCIAL

## 2019-01-09 VITALS
BODY MASS INDEX: 28.62 KG/M2 | SYSTOLIC BLOOD PRESSURE: 138 MMHG | TEMPERATURE: 98.1 F | DIASTOLIC BLOOD PRESSURE: 90 MMHG | WEIGHT: 235 LBS | HEIGHT: 76 IN | HEART RATE: 80 BPM

## 2019-01-09 DIAGNOSIS — K52.9 NONINFECTIVE GASTROENTERITIS AND COLITIS, UNSPECIFIED: ICD-10-CM

## 2019-01-09 PROCEDURE — 99213 OFFICE O/P EST LOW 20 MIN: CPT

## 2019-01-09 NOTE — PLAN
[FreeTextEntry1] : Reassurance. Advised stopping PeptoBismal. Start probiotics and high fiber diet(as tolerated). Remain well hydrated. F/U if sxs worsen.

## 2019-01-09 NOTE — REVIEW OF SYSTEMS
[Fatigue] : fatigue [Nausea] : nausea [Diarrhea] : diarrhea [Negative] : Psychiatric [Fever] : no fever [Chills] : no chills [Abdominal Pain] : no abdominal pain [Vomiting] : no vomiting

## 2019-01-09 NOTE — HISTORY OF PRESENT ILLNESS
[FreeTextEntry8] : 52 y/o male with two week hx of loose (watery) stools. Had just finished a longer than usual course of prednisone. No change in his diet.  No foreign travel. Says he was going upwards of 10 times a day initially. He has been taking PeptoBismal. Says his last dose was last evening. Has had only one BM today so far. Appetite is decreased. Has been drinking fluids.No pain in the abdomen. No blood in the stool. No fever or chills.

## 2019-01-18 ENCOUNTER — MEDICATION RENEWAL (OUTPATIENT)
Age: 54
End: 2019-01-18

## 2019-01-18 RX ORDER — PREDNISONE 10 MG/1
10 TABLET ORAL
Qty: 30 | Refills: 1 | Status: ACTIVE | COMMUNITY
Start: 2019-01-18 | End: 1900-01-01

## 2019-01-21 ENCOUNTER — NON-APPOINTMENT (OUTPATIENT)
Age: 54
End: 2019-01-21

## 2019-01-21 ENCOUNTER — MESSAGE (OUTPATIENT)
Age: 54
End: 2019-01-21

## 2019-01-21 ENCOUNTER — APPOINTMENT (OUTPATIENT)
Dept: INTERNAL MEDICINE | Facility: CLINIC | Age: 54
End: 2019-01-21
Payer: COMMERCIAL

## 2019-01-21 VITALS
OXYGEN SATURATION: 96 % | HEIGHT: 76 IN | HEART RATE: 119 BPM | BODY MASS INDEX: 28.62 KG/M2 | WEIGHT: 235 LBS | DIASTOLIC BLOOD PRESSURE: 100 MMHG | SYSTOLIC BLOOD PRESSURE: 140 MMHG

## 2019-01-21 PROCEDURE — 94010 BREATHING CAPACITY TEST: CPT

## 2019-01-21 PROCEDURE — 99214 OFFICE O/P EST MOD 30 MIN: CPT | Mod: 25

## 2019-01-21 NOTE — ASSESSMENT
[FreeTextEntry1] : patient with  severe  chronic asthma. his spirometry now reveals severe obstruction with an FEV1 of 40% predicted. Patient is advised to stay on prednisone at 40 mg daily. i will also ask him to stop Coreg, in hopes that he is having a bronchospastic reaction  to the beta blocker. I will speak to patient later in the week. This patient cannot return to work at this time..

## 2019-01-21 NOTE — REVIEW OF SYSTEMS
[Shortness Of Breath] : shortness of breath [Wheezing] : wheezing [Cough] : cough [Dyspnea on Exertion] : dyspnea on exertion [Negative] : Heme/Lymph

## 2019-01-21 NOTE — HISTORY OF PRESENT ILLNESS
[FreeTextEntry1] : patient for followup of chronic asthma. [de-identified] : patient is doing very poorly. He had to go back on prednisone 3 days ago. he is at 60 mg daily. He has persistent shortness of breath cough and wheezing. He is unable to work because of shortness of breath. he is very frustrated at the lack of progress. His peak flows range [id number] and 250. He is using nebulizer treatments 4 times daily. there have been no environmental changes. he has been out of work since mid October.

## 2019-01-21 NOTE — PHYSICAL EXAM

## 2019-01-23 ENCOUNTER — APPOINTMENT (OUTPATIENT)
Dept: CARDIOLOGY | Facility: CLINIC | Age: 54
End: 2019-01-23
Payer: COMMERCIAL

## 2019-01-23 VITALS
SYSTOLIC BLOOD PRESSURE: 146 MMHG | DIASTOLIC BLOOD PRESSURE: 90 MMHG | RESPIRATION RATE: 12 BRPM | HEART RATE: 126 BPM | OXYGEN SATURATION: 97 % | BODY MASS INDEX: 29.46 KG/M2 | WEIGHT: 242 LBS

## 2019-01-23 DIAGNOSIS — I42.8 OTHER CARDIOMYOPATHIES: ICD-10-CM

## 2019-01-23 DIAGNOSIS — I10 ESSENTIAL (PRIMARY) HYPERTENSION: ICD-10-CM

## 2019-01-23 DIAGNOSIS — Z86.79 PERSONAL HISTORY OF OTHER DISEASES OF THE CIRCULATORY SYSTEM: ICD-10-CM

## 2019-01-23 DIAGNOSIS — I48.0 PAROXYSMAL ATRIAL FIBRILLATION: ICD-10-CM

## 2019-01-23 PROCEDURE — 93000 ELECTROCARDIOGRAM COMPLETE: CPT

## 2019-01-23 PROCEDURE — 99213 OFFICE O/P EST LOW 20 MIN: CPT

## 2019-01-23 RX ORDER — CARVEDILOL 12.5 MG/1
12.5 TABLET, FILM COATED ORAL TWICE DAILY
Qty: 180 | Refills: 1 | Status: DISCONTINUED | COMMUNITY
Start: 2018-11-19 | End: 2019-01-23

## 2019-01-23 RX ORDER — AMLODIPINE BESYLATE 5 MG/1
5 TABLET ORAL DAILY
Qty: 90 | Refills: 3 | Status: ACTIVE | COMMUNITY
Start: 2019-01-23 | End: 1900-01-01

## 2019-01-23 NOTE — HISTORY OF PRESENT ILLNESS
[FreeTextEntry1] : 54 yo male with hypertension, paroxysmal atrial flutter, non-ischemic cardiomyopathy (EF 30-35% per 10/22/18 echo, likely due to rapid aflutter). Patient underwent cardiac cath on 11/7/18 at [facility], which demonstrated only mild luminal irregularities with LVEF 40%. Patient presents today with continued shortness of breath and wheezing since his last office visit on 11/29/18 (seen by Dr. Stover). Patient denies chest pain, palpitations, syncope, edema, melena, hematochezia, or hematemesis. Patient is scheduled to see Dr. Lopez (pulmonary) tomorrow. He has not yet returned to work due to his dyspnea.\par \par

## 2019-01-23 NOTE — REVIEW OF SYSTEMS
[Dyspnea on exertion] : dyspnea during exertion [Negative] : Psychiatric [Shortness Of Breath] : shortness of breath [Wheezing] : wheezing [Fever] : no fever [Chills] : no chills [Chest Pain] : no chest pain [Lower Ext Edema] : no extremity edema [Leg Claudication] : no intermittent leg claudication [Palpitations] : no palpitations [Coughing Up Blood] : no hemoptysis

## 2019-01-23 NOTE — ASSESSMENT
[FreeTextEntry1] : 52 yo male with hypertension, paroxysmal atrial flutter -> cardioversion, and acute systolic heart failure (LVEF 30-35% per 10/22/18 echo, in setting of DAWN pneumonia and rapid atrial flutter). No obstructive CAD per 11/7/18 cardiac cath with LVEF 40%. 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 Patient is not in decompensated heart failure, but does appear to be in asthma exacerbation currently. \par Patient remains in sinus rhythm per ECG today. Will continue carvedilol 6.25 mg po bid and Entresto 24 mg/26 mg po bid. \par Will repeat echo to reassess LV function in 5 months (May 2019) while on current medical therapy.\par Continue Eliquis 5 mg po bid for now.\par \par With regard to patient's asthma exacerbation, will start prednisone 40 mg po daily x 5 days. Continue albuterol nebs q4-6 hours prn. Patient is scheduled to see Dr. Lopez (pulmonary) tomorrow.\par \par RTC in 2-3 months

## 2019-01-23 NOTE — PHYSICAL EXAM
[General Appearance - Well Developed] : well developed [General Appearance - Well Nourished] : well nourished [General Appearance - In No Acute Distress] : no acute distress [Normal Conjunctiva] : the conjunctiva exhibited no abnormalities [No Oral Cyanosis] : no oral cyanosis [] : no respiratory distress [Respiration, Rhythm And Depth] : normal respiratory rhythm and effort [Auscultation Breath Sounds / Voice Sounds] : lungs were clear to auscultation bilaterally [Bowel Sounds] : normal bowel sounds [Abnormal Walk] : normal gait [Nail Clubbing] : no clubbing of the fingernails [Cyanosis, Localized] : no localized cyanosis [Rhythm Regular] : regular [Normal S1] : normal S1 [Normal S2] : normal S2 [No Murmur] : no murmurs heard [2+] : left 2+ [No Pitting Edema] : no pitting edema present [Normal Rate] : the respiratory rate was normal [Dry Cough] : a dry cough was heard [Scattered Wheezes] : scattered wheezing was heard [Decreased Breath Sounds] : breath sounds were decreased diffusely [Oriented To Time, Place, And Person] : oriented to person, place, and time [Affect] : the affect was normal [Mood] : the mood was normal [FreeTextEntry1] : No JVD present [S3] : no S3 [S4] : no S4 [Right Carotid Bruit] : no bruit heard over the right carotid [Left Carotid Bruit] : no bruit heard over the left carotid

## 2019-01-23 NOTE — REASON FOR VISIT
[Cardiomyopathy] : cardiomyopathy [Dyspnea] : dyspnea [Follow-Up - Clinic] : a clinic follow-up of [FreeTextEntry1] : Atrial flutter

## 2019-01-29 ENCOUNTER — APPOINTMENT (OUTPATIENT)
Dept: INTERNAL MEDICINE | Facility: CLINIC | Age: 54
End: 2019-01-29

## 2019-01-30 ENCOUNTER — NON-APPOINTMENT (OUTPATIENT)
Age: 54
End: 2019-01-30

## 2019-01-30 ENCOUNTER — APPOINTMENT (OUTPATIENT)
Dept: INTERNAL MEDICINE | Facility: CLINIC | Age: 54
End: 2019-01-30
Payer: COMMERCIAL

## 2019-01-30 VITALS
DIASTOLIC BLOOD PRESSURE: 90 MMHG | HEIGHT: 76 IN | WEIGHT: 242 LBS | OXYGEN SATURATION: 97 % | SYSTOLIC BLOOD PRESSURE: 124 MMHG | HEART RATE: 102 BPM | TEMPERATURE: 98 F | BODY MASS INDEX: 29.47 KG/M2

## 2019-01-30 PROCEDURE — 94010 BREATHING CAPACITY TEST: CPT

## 2019-01-30 PROCEDURE — 99212 OFFICE O/P EST SF 10 MIN: CPT | Mod: 25

## 2019-01-30 NOTE — HISTORY OF PRESENT ILLNESS
[FreeTextEntry1] : Followup asthma. [de-identified] : Patient stopped Coreg approximately 9 days ago with the hope that this would improve his asthma. He is now on a prednisone taper currently on 20 mg daily. He is feeling better overall. Peak flows range between 400-4 50. He states his normal peak flow can be up to 700. He is only using nebulizer treatments about once per day. He feels he is better but not completely normal. He wants to go back to work.

## 2019-01-30 NOTE — ASSESSMENT
[FreeTextEntry1] : Patient was given a nebulized albuterol treatment. His spirometry after the treatment reveals a moderate obstruction with an FEV1 of 62% of predicted and an FVC of 75% predicted this is significantly improved from prior to treatment with steroids. I feel the patient's asthma is coming under improved control. Hopefully we can taper his steroids further. I will asked patient to continue prednisone at 10 mg daily for the following 7 days and to monitor his peak flows. He will call me in one week. I feel the patient may return to work without restriction on 2-4 -19.

## 2019-01-30 NOTE — PHYSICAL EXAM

## 2019-02-01 ENCOUNTER — APPOINTMENT (OUTPATIENT)
Dept: PULMONOLOGY | Facility: CLINIC | Age: 54
End: 2019-02-01

## 2019-02-13 ENCOUNTER — APPOINTMENT (OUTPATIENT)
Dept: INTERNAL MEDICINE | Facility: CLINIC | Age: 54
End: 2019-02-13
Payer: COMMERCIAL

## 2019-02-13 ENCOUNTER — NON-APPOINTMENT (OUTPATIENT)
Age: 54
End: 2019-02-13

## 2019-02-13 VITALS
HEIGHT: 76 IN | DIASTOLIC BLOOD PRESSURE: 94 MMHG | OXYGEN SATURATION: 95 % | WEIGHT: 242 LBS | HEART RATE: 121 BPM | BODY MASS INDEX: 29.47 KG/M2 | SYSTOLIC BLOOD PRESSURE: 132 MMHG

## 2019-02-13 PROCEDURE — 99213 OFFICE O/P EST LOW 20 MIN: CPT | Mod: 25

## 2019-02-13 PROCEDURE — 94010 BREATHING CAPACITY TEST: CPT

## 2019-02-13 RX ORDER — PREDNISONE 10 MG/1
10 TABLET ORAL
Qty: 60 | Refills: 1 | Status: ACTIVE | COMMUNITY
Start: 2019-02-13 | End: 1900-01-01

## 2019-02-13 NOTE — HISTORY OF PRESENT ILLNESS
[FreeTextEntry1] : Followup chronic asthma. [de-identified] : Patient generally is doing better. He is maintained on prednisone 10 mg daily. His peak flows usually run between 500-550. He states his best peak flow when he feels well is 700. He's had no major exacerbations of late. He is not requiring his nebulizer. He is not back to work yet.

## 2019-02-13 NOTE — PHYSICAL EXAM

## 2019-02-13 NOTE — ASSESSMENT
[FreeTextEntry1] : Current spirometry is reviewed. FEV1 is 62% of predicted with an FVC of 78% of predicted. This patient appears to be slowly improving. We'll continue prednisone for 2 more weeks and patient is to call me at that time. If his peak flows are stable we'll reduce her prednisone at that time. I feel the patient may go back to work on 2-18-19 without restriction.

## 2019-02-15 ENCOUNTER — RX RENEWAL (OUTPATIENT)
Age: 54
End: 2019-02-15

## 2019-02-15 RX ORDER — PREDNISONE 10 MG/1
10 TABLET ORAL
Qty: 60 | Refills: 1 | Status: ACTIVE | COMMUNITY
Start: 2018-12-12 | End: 1900-01-01

## 2019-03-01 ENCOUNTER — TRANSCRIPTION ENCOUNTER (OUTPATIENT)
Age: 54
End: 2019-03-01

## 2019-03-06 ENCOUNTER — APPOINTMENT (OUTPATIENT)
Dept: INTERNAL MEDICINE | Facility: CLINIC | Age: 54
End: 2019-03-06
Payer: COMMERCIAL

## 2019-03-06 ENCOUNTER — NON-APPOINTMENT (OUTPATIENT)
Age: 54
End: 2019-03-06

## 2019-03-06 VITALS
DIASTOLIC BLOOD PRESSURE: 100 MMHG | BODY MASS INDEX: 29.47 KG/M2 | HEIGHT: 76 IN | SYSTOLIC BLOOD PRESSURE: 160 MMHG | WEIGHT: 242 LBS | HEART RATE: 105 BPM | OXYGEN SATURATION: 97 %

## 2019-03-06 DIAGNOSIS — J45.909 UNSPECIFIED ASTHMA, UNCOMPLICATED: ICD-10-CM

## 2019-03-06 PROCEDURE — 99213 OFFICE O/P EST LOW 20 MIN: CPT | Mod: 25

## 2019-03-06 PROCEDURE — 94010 BREATHING CAPACITY TEST: CPT

## 2019-03-06 RX ORDER — PREDNISONE 5 MG/1
5 TABLET ORAL
Qty: 60 | Refills: 2 | Status: ACTIVE | COMMUNITY
Start: 2019-03-06 | End: 1900-01-01

## 2019-03-06 NOTE — HISTORY OF PRESENT ILLNESS
[FreeTextEntry1] : Followup chronic asthma [de-identified] : Patient is doing fairly well on prednisone 10 mg daily. He is maintained on Symbicort. His peak flows usually range between 350 and 500. He is not using his nebulizer. He is not back to work yet. He does no formal exercise. He is not smoking.

## 2019-03-06 NOTE — ASSESSMENT
[FreeTextEntry1] : Current spirometry is unchanged with nephew B1 of 60% of predicted. Patient is advised to reduce her prednisone to 7.5 mg for one week then 5 mg daily. He is to call me in 2 weeks.

## 2019-03-06 NOTE — PLAN
[FreeTextEntry1] : Patient to reduce her prednisone to 7.5 mg x1 week then 5 mg daily. He's to call me in 2 weeks.

## 2019-05-14 ENCOUNTER — APPOINTMENT (OUTPATIENT)
Dept: CARDIOLOGY | Facility: CLINIC | Age: 54
End: 2019-05-14

## 2019-05-21 ENCOUNTER — RX RENEWAL (OUTPATIENT)
Age: 54
End: 2019-05-21

## 2019-05-21 NOTE — ASSESSMENT
[FreeTextEntry1] : 54 yo male with hypertension, paroxysmal atrial flutter -> cardioversion, and acute systolic heart failure (LVEF 30-35% per 10/22/18 echo, in setting of DAWN pneumonia and rapid atrial flutter). No obstructive CAD per 11/7/18 cardiac cath with LVEF 40%. 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 Patient is not in decompensated heart failure currently. Continue to monitor clinically off beta-blocker therapy. \par ECG today demonstrated sinus tachycardia. \par Will continue Entresto 24 mg/26 mg po bid. \par Will repeat echo to reassess LV function in April/May 2019 while on current medical therapy.\par Continue Eliquis 5 mg po bid for now.\par \par Blood pressure is currently uncontrolled with discontinuation of carvedilol on 1/21/19. \par Will add amlodipine 5 mg po daily. \par Patient to monitor his BP at home and call if still elevated after several days of taking amlodipine.\par \par RTC in 3-4 months

## 2019-05-21 NOTE — REASON FOR VISIT
[Cardiomyopathy] : cardiomyopathy [Follow-Up - Clinic] : a clinic follow-up of [Dyspnea] : dyspnea [Hypertension] : hypertension [FreeTextEntry1] : Paroxysmal atrial flutter

## 2019-05-21 NOTE — PHYSICAL EXAM
[General Appearance - Well Developed] : well developed [General Appearance - Well Nourished] : well nourished [General Appearance - In No Acute Distress] : no acute distress [Normal Conjunctiva] : the conjunctiva exhibited no abnormalities [No Oral Cyanosis] : no oral cyanosis [Bowel Sounds] : normal bowel sounds [Abnormal Walk] : normal gait [Nail Clubbing] : no clubbing of the fingernails [Cyanosis, Localized] : no localized cyanosis [Oriented To Time, Place, And Person] : oriented to person, place, and time [Affect] : the affect was normal [Mood] : the mood was normal [Normal Rate] : normal [Rhythm Regular] : regular [Normal S1] : normal S1 [Normal S2] : normal S2 [No Murmur] : no murmurs heard [2+] : left 2+ [No Pitting Edema] : no pitting edema present [Normal Breath Sounds] : normal bilateral breath sounds [Wheezing Bilaterally] : no wheezing was heard [FreeTextEntry1] : No JVD present [S3] : no S3 [S4] : no S4 [Right Carotid Bruit] : no bruit heard over the right carotid [Left Carotid Bruit] : no bruit heard over the left carotid

## 2019-05-21 NOTE — HISTORY OF PRESENT ILLNESS
[FreeTextEntry1] : 52 yo male with hypertension, paroxysmal atrial flutter, non-ischemic cardiomyopathy (EF 30-35% per 10/22/18 echo, likely due to rapid aflutter). Patient underwent cardiac cath on 11/7/18 at [facility], which demonstrated only mild luminal irregularities with LVEF 40%. \par \par Patient presents today for follow-up after discontinuing his carvedilol on 1/21/19 due to concern from pulmonologist (Dr. Lopez) that this was exacerbating his asthma as he was not significantly improving despite current management including oral prednisone. Patient reports that his dyspnea seems to be improving after discontinuation of carvedilol. Patient denies chest pain, palpitations, syncope, edema, melena, hematochezia, or hematemesis. He reports that his BP has been elevated since stopping the carvedilol. He also reports drinking a 6 pack of beer last evening.

## 2019-05-21 NOTE — REVIEW OF SYSTEMS
[Shortness Of Breath] : shortness of breath [Negative] : Heme/Lymph [Fever] : no fever [Chills] : no chills [Chest Pain] : no chest pain [Lower Ext Edema] : no extremity edema [Leg Claudication] : no intermittent leg claudication [Palpitations] : no palpitations [Cough] : no cough [Coughing Up Blood] : no hemoptysis

## 2019-05-24 ENCOUNTER — APPOINTMENT (OUTPATIENT)
Dept: CARDIOLOGY | Facility: CLINIC | Age: 54
End: 2019-05-24

## 2019-07-15 NOTE — PN
Physical Exam: 


SUBJECTIVE: Patient seen and examined, breathing with some improvement, cough 

with greenish clear sputum, no chest pain, palpitations, or dizziness. reports 

last 3 weeks of symptoms with productive cough, shortness of breath and 

subjective fevers. No recent travel noted. No prior h/o weight loss, night 

sweats or gradual symptom onset. Sick contacts, as  in hospital.








OBJECTIVE:





 Vital Signs











 Period  Temp  Pulse  Resp  BP Sys/Tobias  Pulse Ox


 


 Last 24 Hr  97.6 F-100.3 F  124-135    107-131/67-97  











GENERAL: The patient is awake, alert, and fully oriented, in no acute distress.


HEAD: Normal with no signs of trauma.


EYES: PERRL, extraocular movements intact, sclera anicteric, conjunctiva clear. 

No ptosis. 


neck: soft, supple, no JVD visualized


Chest: few right basilar and left upper lobe rales, positive air entry, no 

wheezing


ENT: Ears normal, nares patent, oropharynx clear without exudates, moist mucous 


membranes.


NECK: Trachea midline, full range of motion, supple. 


CVS: S1S2 irregular


ABDOMEN: Soft, nontender, nondistended, normoactive bowel sounds, no guarding, 

no 


rebound


EXTREMITIES: 2+ pulses, warm, well-perfused, no edema. 


NEUROLOGICAL: Cranial nerves II through XII grossly intact. Normal speech, 

ambulating well in the room


PSYCH: Normal mood, normal affect.


SKIN: Warm, dry, normal turgor, no rashes or lesions noted














 Laboratory Results - last 24 hr











  10/20/18 10/21/18 10/21/18





  23:58 00:09 00:09


 


WBC   11.0 H 


 


RBC   4.71 


 


Hgb   14.6 


 


Hct   43.8 


 


MCV   93.0 


 


MCH   31.0 


 


MCHC   33.3 


 


RDW   12.9 


 


Plt Count   234 


 


MPV   7.7 


 


Absolute Neuts (auto)   8.4 H 


 


Neutrophils %   76.3 


 


Lymphocytes %   7.8 L 


 


Monocytes %   14.7 H 


 


Eosinophils %   0.1 


 


Basophils %   1.1 


 


Nucleated RBC %   0 


 


ESR   


 


VBG pH  7.49 H  


 


POC VBG pCO2  34.4 L  


 


POC VBG pO2  42.8  


 


Mixed VBG HCO3  25.6 H  


 


Sodium    130 L


 


Potassium    3.9


 


Chloride    95 L


 


Carbon Dioxide    25


 


Anion Gap    11


 


BUN    14


 


Creatinine    0.8


 


Creat Clearance w eGFR    > 60


 


Random Glucose    113 H


 


Serum Osmolality   


 


Calcium    8.5


 


Phosphorus   


 


Magnesium   


 


Total Bilirubin    1.1 H


 


AST    24


 


ALT    25


 


Alkaline Phosphatase    66


 


Troponin I    < 0.02


 


C-Reactive Protein   


 


B-Natriuretic Peptide    2442.2 H


 


Total Protein    6.9


 


Albumin    3.5


 


TSH    0.62


 


Urine Color   


 


Urine Appearance   


 


Urine pH   


 


Ur Specific Gravity   


 


Urine Protein   


 


Urine Glucose (UA)   


 


Urine Ketones   


 


Urine Blood   


 


Urine Nitrite   


 


Urine Bilirubin   


 


Urine Urobilinogen   


 


Ur Leukocyte Esterase   


 


Urine WBC (Auto)   


 


Urine RBC (Auto)   


 


Urine Bacteria   














  10/21/18 10/21/18 10/21/18





  00:11 02:35 09:43


 


WBC    8.0


 


RBC    5.07


 


Hgb    15.7


 


Hct    47.6


 


MCV    93.9


 


MCH    31.0


 


MCHC    33.0


 


RDW    13.3


 


Plt Count    241


 


MPV    7.7


 


Absolute Neuts (auto)   


 


Neutrophils %   


 


Lymphocytes %   


 


Monocytes %   


 


Eosinophils %   


 


Basophils %   


 


Nucleated RBC %   


 


ESR   


 


VBG pH   


 


POC VBG pCO2   


 


POC VBG pO2   


 


Mixed VBG HCO3   


 


Sodium   


 


Potassium   


 


Chloride   


 


Carbon Dioxide   


 


Anion Gap   


 


BUN   


 


Creatinine   


 


Creat Clearance w eGFR   


 


Random Glucose   


 


Serum Osmolality   


 


Calcium   


 


Phosphorus   


 


Magnesium   


 


Total Bilirubin   


 


AST   


 


ALT   


 


Alkaline Phosphatase   


 


Troponin I   


 


C-Reactive Protein   


 


B-Natriuretic Peptide   


 


Total Protein   


 


Albumin   


 


TSH   0.84  D 


 


Urine Color  Yellow  


 


Urine Appearance  Clear  


 


Urine pH  6.0  


 


Ur Specific Gravity  1.017  


 


Urine Protein  1+ H  


 


Urine Glucose (UA)  Negative  


 


Urine Ketones  1+ H  


 


Urine Blood  1+ H  


 


Urine Nitrite  Negative  


 


Urine Bilirubin  Negative  


 


Urine Urobilinogen  Negative  


 


Ur Leukocyte Esterase  Trace  


 


Urine WBC (Auto)  2  


 


Urine RBC (Auto)  3  


 


Urine Bacteria  Rare  














  10/21/18 10/21/18 10/21/18





  09:43 09:43 09:43


 


WBC   


 


RBC   


 


Hgb   


 


Hct   


 


MCV   


 


MCH   


 


MCHC   


 


RDW   


 


Plt Count   


 


MPV   


 


Absolute Neuts (auto)   


 


Neutrophils %   


 


Lymphocytes %   


 


Monocytes %   


 


Eosinophils %   


 


Basophils %   


 


Nucleated RBC %   


 


ESR    25 H


 


VBG pH   


 


POC VBG pCO2   


 


POC VBG pO2   


 


Mixed VBG HCO3   


 


Sodium  136  


 


Potassium  4.1  


 


Chloride  100  


 


Carbon Dioxide  24  


 


Anion Gap  12  


 


BUN  10  


 


Creatinine  0.7  


 


Creat Clearance w eGFR  > 60  


 


Random Glucose  136 H  


 


Serum Osmolality   


 


Calcium  9.0  


 


Phosphorus  4.0  


 


Magnesium  2.7 H  


 


Total Bilirubin  1.2 H  


 


AST  23  


 


ALT  30  


 


Alkaline Phosphatase  83  


 


Troponin I   < 0.02 


 


C-Reactive Protein  16.2 H  


 


B-Natriuretic Peptide   


 


Total Protein  8.2  


 


Albumin  4.0  


 


TSH   


 


Urine Color   


 


Urine Appearance   


 


Urine pH   


 


Ur Specific Gravity   


 


Urine Protein   


 


Urine Glucose (UA)   


 


Urine Ketones   


 


Urine Blood   


 


Urine Nitrite   


 


Urine Bilirubin   


 


Urine Urobilinogen   


 


Ur Leukocyte Esterase   


 


Urine WBC (Auto)   


 


Urine RBC (Auto)   


 


Urine Bacteria   














  10/21/18





  09:43


 


WBC 


 


RBC 


 


Hgb 


 


Hct 


 


MCV 


 


MCH 


 


MCHC 


 


RDW 


 


Plt Count 


 


MPV 


 


Absolute Neuts (auto) 


 


Neutrophils % 


 


Lymphocytes % 


 


Monocytes % 


 


Eosinophils % 


 


Basophils % 


 


Nucleated RBC % 


 


ESR 


 


VBG pH 


 


POC VBG pCO2 


 


POC VBG pO2 


 


Mixed VBG HCO3 


 


Sodium 


 


Potassium 


 


Chloride 


 


Carbon Dioxide 


 


Anion Gap 


 


BUN 


 


Creatinine 


 


Creat Clearance w eGFR 


 


Random Glucose 


 


Serum Osmolality  295


 


Calcium 


 


Phosphorus 


 


Magnesium 


 


Total Bilirubin 


 


AST 


 


ALT 


 


Alkaline Phosphatase 


 


Troponin I 


 


C-Reactive Protein 


 


B-Natriuretic Peptide 


 


Total Protein 


 


Albumin 


 


TSH 


 


Urine Color 


 


Urine Appearance 


 


Urine pH 


 


Ur Specific Gravity 


 


Urine Protein 


 


Urine Glucose (UA) 


 


Urine Ketones 


 


Urine Blood 


 


Urine Nitrite 


 


Urine Bilirubin 


 


Urine Urobilinogen 


 


Ur Leukocyte Esterase 


 


Urine WBC (Auto) 


 


Urine RBC (Auto) 


 


Urine Bacteria 








Active Medications











Generic Name Dose Route Start Last Admin





  Trade Name Freq  PRN Reason Stop Dose Admin


 


Albuterol Sulfate  2 puff  10/21/18 07:26  





  Ventolin Hfa Inhaler -  IH   





  Q6H PRN   





  ASTHMA   





     





     





     


 


Apixaban  5 mg  10/21/18 13:45  





  Eliquis -  PO   





  BID RAEGAN   





     





     





     





     


 


Budesonide/Formoterol Fumarate  1 puff  10/21/18 10:00  10/21/18 10:09





  Symbicort 160/4.5mcg -  IH   Not Given





  DAILY RAEGAN   





     





     





     





     


 


Diltiazem HCl  300 mg  10/21/18 10:00  10/21/18 10:08





  Cardizem Cd -  PO   300 mg





  DAILY RAEGAN   Administration





     





     





     





     


 


Heparin Sodium (Porcine)  5,000 unit  10/21/18 07:15  10/21/18 10:09





  Heparin -  SQ   5,000 unit





  TID RAEGAN   Administration





     





     





     





     


 


Azithromycin 500 mg/ Dextrose  250 mls @ 250 mls/hr  10/21/18 10:00  10/21/18 10

:38





  IVPB   250 mls/hr





  DAILY RAEGAN   Administration





     





     





     





     


 


Ceftriaxone Sodium 1 gm/  50 mls @ 100 mls/hr  10/21/18 10:00  10/21/18 10:09





  Dextrose  IVPB   100 mls/hr





  DAILY RAEGAN   Administration





     





     





     





     


 


Loratadine  10 mg  10/21/18 10:00  10/21/18 10:09





  Claritin -  PO   10 mg





  DAILY RAEGAN   Administration





     





     





     





     


 


Metoprolol Tartrate  25 mg  10/21/18 13:45  





  Lopressor -  PO   





  BID RAEGAN   





     





     





     





     


 


Valsartan  40 mg  10/21/18 10:00  10/21/18 10:09





  Diovan -  PO   40 mg





  DAILY RAEGAN   Administration





     





     





     





     





 Microbiology





10/21/18 00:09   Nasopharyngeal Swab   Influenza Types A,B Antigen - Final


10/21/18 00:09   Nasopharyngeal Swab    - Final





CXR - MELISSA PNA


CT chest - MELISSA PNA


EKG Aflutter with RVR





ASSESSMENT/PLAN:


53 yom with PMHx of paroxysmal Afib not on AC (prior h/o being on coumadin), 

Asthma/bronchitis, admitted with MELISSA CAP and Atrial flutter with RVR.





-MELISSA PNA with early sepsis


-Atrial flutter with RVR


-Hyponatremia, hypovolumic vs r/o Legionella


-H/o Bronchial Asthma/Bronchitis


-HTN





PLan:


Ceftriaxone/azithromycin.


Blood/sputum cx.


Urine PNA studies.


Flu swab neg.


Pulmonary input noted. Symbicort/Albuterol prn. 


Current clinical presentation consistent with CAP in the setting of sick 

contacts/Asthma rather than TB.


Cardiology input noted.


Cardizem/low dose betablocker. Started on eliquis.


Follow up 2D echo.


Hold off on lasix, strict I/Os, daily weights.


Continue valsartan


DVTPPX started on eliquis as above


Dispo pending clinical improvement.


Plan discussed with patient and nursing in detail, all questions answered.











Visit type





- Emergency Visit


Emergency Visit: Yes


ED Registration Date: 10/21/18


Care time: The patient presented to the Emergency Department on the above date 

and was hospitalized for further evaluation of their emergent condition.





- New Patient


This patient is new to me today: Yes


Date on this admission: 10/21/18





- Critical Care


Critical Care patient: No





- Discharge Referral


Referred to [facility] No Statement Selected

## 2019-07-29 ENCOUNTER — RX RENEWAL (OUTPATIENT)
Age: 54
End: 2019-07-29

## 2019-07-29 RX ORDER — BUDESONIDE AND FORMOTEROL FUMARATE DIHYDRATE 160; 4.5 UG/1; UG/1
160-4.5 AEROSOL RESPIRATORY (INHALATION) TWICE DAILY
Qty: 3 | Refills: 0 | Status: ACTIVE | COMMUNITY
Start: 2019-05-21 | End: 1900-01-01

## 2019-09-21 NOTE — PHYSICAL EXAM
[No Acute Distress] : no acute distress [Clear to Auscultation] : lungs were clear to auscultation bilaterally [Normal Rate] : normal rate  [Regular Rhythm] : with a regular rhythm currently on lantus 12  FS on lower end, not "true" hypoglycemia  will cont to monitor and encourage PO intake [Normal S1, S2] : normal S1 and S2 [Soft] : abdomen soft [Non Tender] : non-tender [Non-distended] : non-distended [No HSM] : no HSM [Normal Bowel Sounds] : normal bowel sounds [Normal Mood] : the mood was normal [de-identified] : Overweight male

## 2019-10-04 ENCOUNTER — HOSPITAL ENCOUNTER (INPATIENT)
Dept: HOSPITAL 74 - JER | Age: 54
LOS: 10 days | Discharge: HOME | DRG: 347 | End: 2019-10-14
Attending: INTERNAL MEDICINE | Admitting: INTERNAL MEDICINE
Payer: COMMERCIAL

## 2019-10-04 VITALS — BODY MASS INDEX: 30 KG/M2

## 2019-10-04 DIAGNOSIS — I50.22: ICD-10-CM

## 2019-10-04 DIAGNOSIS — I10: ICD-10-CM

## 2019-10-04 DIAGNOSIS — K85.20: ICD-10-CM
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
## 2019-10-04 DIAGNOSIS — S12.100A: Primary | ICD-10-CM
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
## 2019-10-04 DIAGNOSIS — I11.0: ICD-10-CM

## 2019-10-04 DIAGNOSIS — I42.8: ICD-10-CM

## 2019-10-04 DIAGNOSIS — I48.92: ICD-10-CM

## 2019-10-04 DIAGNOSIS — J45.909: ICD-10-CM
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
## 2019-10-04 DIAGNOSIS — F10.239: ICD-10-CM

## 2019-10-04 DIAGNOSIS — R10.9: ICD-10-CM

## 2019-10-04 DIAGNOSIS — I48.91: ICD-10-CM

## 2019-10-04 DIAGNOSIS — F10.229: ICD-10-CM

## 2019-10-04 LAB
ALBUMIN SERPL-MCNC: 4.2 G/DL (ref 3.4–5)
ALP SERPL-CCNC: 71 U/L (ref 45–117)
ALT SERPL-CCNC: 35 U/L (ref 13–61)
ANION GAP SERPL CALC-SCNC: 7 MMOL/L (ref 8–16)
AST SERPL-CCNC: 27 U/L (ref 15–37)
BASOPHILS # BLD: 1.1 % (ref 0–2)
BILIRUB SERPL-MCNC: 0.2 MG/DL (ref 0.2–1)
BUN SERPL-MCNC: 9.4 MG/DL (ref 7–18)
CALCIUM SERPL-MCNC: 9.3 MG/DL (ref 8.5–10.1)
CHLORIDE SERPL-SCNC: 103 MMOL/L (ref 98–107)
CO2 SERPL-SCNC: 28 MMOL/L (ref 21–32)
CREAT SERPL-MCNC: 0.6 MG/DL (ref 0.55–1.3)
DEPRECATED RDW RBC AUTO: 14.5 % (ref 11.9–15.9)
EOSINOPHIL # BLD: 1.4 % (ref 0–4.5)
GLUCOSE SERPL-MCNC: 124 MG/DL (ref 74–106)
HCT VFR BLD CALC: 45 % (ref 35.4–49)
HGB BLD-MCNC: 15.3 GM/DL (ref 11.7–16.9)
LYMPHOCYTES # BLD: 33.6 % (ref 8–40)
MAGNESIUM SERPL-MCNC: 2 MG/DL (ref 1.8–2.4)
MCH RBC QN AUTO: 31.5 PG (ref 25.7–33.7)
MCHC RBC AUTO-ENTMCNC: 34.1 G/DL (ref 32–35.9)
MCV RBC: 92.4 FL (ref 80–96)
MONOCYTES # BLD AUTO: 11.1 % (ref 3.8–10.2)
NEUTROPHILS # BLD: 52.8 % (ref 42.8–82.8)
PHOSPHATE SERPL-MCNC: 3 MG/DL (ref 2.5–4.9)
PLATELET # BLD AUTO: 237 K/MM3 (ref 134–434)
PMV BLD: 6.8 FL (ref 7.5–11.1)
POTASSIUM SERPLBLD-SCNC: 4.4 MMOL/L (ref 3.5–5.1)
PROT SERPL-MCNC: 7.8 G/DL (ref 6.4–8.2)
RBC # BLD AUTO: 4.87 M/MM3 (ref 4–5.6)
SODIUM SERPL-SCNC: 138 MMOL/L (ref 136–145)
WBC # BLD AUTO: 4.9 K/MM3 (ref 4–10)

## 2019-10-04 PROCEDURE — HZ2ZZZZ DETOXIFICATION SERVICES FOR SUBSTANCE ABUSE TREATMENT: ICD-10-PCS | Performed by: INTERNAL MEDICINE

## 2019-10-04 RX ADMIN — HEPARIN SODIUM SCH UNIT: 5000 INJECTION, SOLUTION INTRAVENOUS; SUBCUTANEOUS at 21:10

## 2019-10-04 RX ADMIN — LIDOCAINE SCH PATCH: 50 PATCH TOPICAL at 17:53

## 2019-10-04 RX ADMIN — Medication SCH EACH: at 21:11

## 2019-10-04 RX ADMIN — ACETAMINOPHEN PRN MG: 325 TABLET ORAL at 22:04

## 2019-10-04 NOTE — PN
Teaching Attending Note


Name of Resident: Dari Gibson





ATTENDING PHYSICIAN STATEMENT





I saw and evaluated the patient.


I reviewed the resident's note and discussed the case with the resident.


I agree with the resident's findings and plan as documented.








SUBJECTIVE:


CC: fall 


HPI:  55 y/o man with h/o P AFib/A flutter, non ischemic cardiomyopathy, PFO, 

HTN, ETOh abuse, Asthma, who presented with a fall  while intoxicated. 


he drinks beer and Vodka daily . last night he was drunk, and fell backwards . 

brother was called and patient was brought to ER. he denies any dizziness, 

palpitations, or cp /SOB before or after the fall. he denies any pain in neck, 

or weaknes, numbness or tingling. no urinary sx or  fever , or diarrhea . 


in ER CT of head and C spine showed C 2 Fx ( reviewed)  


EKG ( sinus rhythm, Q waves in inferior leads, old ). 








OBJECTIVE:


NAD , awake, alert, and oriented .


HEENT: dry MMM, nl oropharynx, no thrush. no bruising on neck . b/l horizontal 

nystagmus 


CV: RRR, NO MRG 


Lungs: CATB 


Abd: : obese, soft, NT, ND , NL BS. 


Ext : No edema , no erythema, tremor in hands 


neuro: EOMI, no facial droop, round equal pupils reactive to light. tongue at 

mid line. strength 5/5 in upper and lower extremities proximally and distally. 

sensation to light touch nl. reflexes unable to obtain as he is not relaxed 


 





ASSESSMENT AND PLAN:





 55 y/o man with h/o P AFib/A flutter, non ischemic cardiomyopathy, PFO, HTN, 

ETOh abuse, Asthma, who presented with a fall  while intoxicated. he was found 

to have C2 Fx and ETOH Withdrawal 





1-S/p fall with resultant  B/l C2 Fx on CT scan : 


- No syncope. mecahnical fall 


- C2 fx, will cont collar. change soft collar to hard collar 


- tylenol if has any pain 


- MRI . 


- dr. Faulkner was called in ER 


- No neuro deficits 





2- ETOH WD: No signs of Wernicke's. 


- start librium protocol 


- start folate, thiamine , and MVT. 


- he was not given IV thiamine in Er, will give 200 mg once 


- check Phos and Mg and replete before feeding 








3- H/o chronic Systolic heart failure: last echo in system with EF of 35%. had 

cath in 11/18 and was told it was neg. 


- volume depleted at this time. 


- Monitor on IVF for now 


- per previous notes, he is on coreg , and entresto. will confirm dosages and 

order 





4- H/o AFib/A flutter. now in sinus. He is not compliant with his eliquis 


- will hold eliquis until decision on any procedure for C2 Fx 


- confirm coreg and resume 





5- DVT px : start SQ heparin. can hold for any procedure

## 2019-10-04 NOTE — PDOC
History of Present Illness





- General


Chief Complaint: Alcohol intoxication


Stated Complaint: Alcohol intoxication


Time Seen by Provider: 10/04/19 11:34


History Source: Patient


Exam Limitations: No Limitations





- History of Present Illness


Initial Comments: 





10/04/19 13:44


Armond Charles is a 54yM w PMHx CHF and asthma presenting with back pain s/p fall 

and alcohol intoxication. Has been drinking unmeasurable large amounts of 

alcohol everyday for past 2 months after being diagnosed with CHF. Last night/

this morning, pt fell down 2 times on back. Reports no LOC or head trauma. 

Associated nausea, vomited once non bloody this morning, midsternal chest pain 

for last few days. Last drink around 9am this morning. Denies fever, SOB, AB 

pain, urinary/bowel movement changes. Has been passing gas 





Past History





- Past Medical History


Allergies/Adverse Reactions: 


 Allergies











Allergy/AdvReac Type Severity Reaction Status Date / Time


 


No Known Allergies Allergy   Verified 10/13/14 10:16











Home Medications: 


Ambulatory Orders





Albuterol Sulfate Inhaler - [Ventolin HFA Inhaler -] 1 - 2 inh PO QID PRN 10/13/

14 


Budesonide/Formeterol Fumarate [SYMBICORT 160/4.5mcg -] 1 inh PO DAILY 11/20/17 


Amiodarone HCl [Cordarone -] 200 mg PO BID #14 tablet 10/27/18 


Apixaban [Eliquis -] 5 mg PO BID #60 tablet 10/27/18 


Carvedilol [Coreg -] 25 mg PO BID #60 tablet 10/27/18 


Cefuroxime Axetil [Ceftin -] 500 mg PO Q12H #6 tablet 10/27/18 


Ipratropium 0.02% Nebulizer [Atrovent 0.02% Nebulizer -] 1 amp NEB RQID PRN #25 

amp 10/27/18 


Sacubitril/Valsartan [Entresto 24 mg-26 mg Tablet] 1 tab PO BID #60 tablet 10/27

/18 


predniSONE [Deltasone -] See Taper PO DAILY #30 tablet 10/27/18 








Anemia: No


Asthma: Yes


Cancer: No


Cardiac Disorders: Yes (TAA.  1 EPISODE ASTHMA RELATED A-FIB 5 YRS AGO)


CVA: No


COPD: No


CHF: No


Dementia: No


Diabetes: No


GI Disorders: No


 Disorders: No


HTN: Yes


Hypercholesterolemia: No


Liver Disease: No


Seizures: No


Thyroid Disease: No





- Surgical History


Abdominal Surgery: No


Appendectomy: No


Cardiac Surgery: No


Cholecystectomy: No


Lung Surgery: No


Neurologic Surgery: No


Orthopedic Surgery: No





- Psycho Social/Smoking Cessation Hx


Smoking History: Never smoked


Have you smoked in the past 12 months: Yes


Number of Cigarettes Smoked Daily: 3


'Breaking Loose' booklet given: 10/13/14


Hx Alcohol Use: Yes


Drug/Substance Use Hx: No


Substance Use Type: Alcohol


Hx Substance Use Treatment: No





**Review of Systems





- Review of Systems


Constitutional: No: Chills, Fever


HEENTM: No: Eye Pain, Recent change in vision, Nose Pain, Throat Pain, Mouth 

Pain


Respiratory: No: Cough, Shortness of Breath


Cardiac (ROS): Yes: Chest Pain.  No: Edema, Palpitations, Syncope


ABD/GI: Yes: Nausea, Vomiting.  No: Abdominal Distended, Constipated, Diarrhea


: No: Burning, Dysuria, Discharge, Frequency, Flank Pain, Hematuria


Musculoskeletal: Yes: Back Pain (low).  No: Joint Pain, Joint Swelling, Muscle 

Pain


Integumentary: No: Bruising, Dryness, Erythema


Neurological: No: Headache, Seizure, Tingling, Tremors


Psychiatric: No: Anxiety, Depression, Stressors


Endocrine: No: Excessive Sweating, Flushing, Intolerance to Cold, Intolerance 

to Heat


Hematologic/Lymphatic: No: Anemia, Blood Clots





*Physical Exam





- Vital Signs


 Last Vital Signs











Temp Pulse Resp BP Pulse Ox


 


 98.2 F   98 H  19   139/82   96 


 


 10/04/19 11:19  10/04/19 11:19  10/04/19 11:19  10/04/19 11:19  10/04/19 11:19














- Physical Exam


General Appearance: Yes: Nourished, Appropriately Dressed, Intoxicated, Obese


HEENT: positive: EOMI, BRIEN, Normal Voice, Hearing Grossly Normal.  negative: 

Scleral Icterus (R), Scleral Icterus (L), Nasal Congestion, Rhinorrhea


Neck: positive: Supple.  negative: Tender, Rigid, Decreased range of motion (

full ROM)


Respiratory/Chest: positive: Lungs Clear, Normal Breath Sounds.  negative: 

Chest Tender, Respiratory Distress, Crackles, Rales, Rhonchi, Stridor, Wheezing


Cardiovascular: positive: Regular Rhythm, S1, S2, Tachycardia.  negative: Edema

, Murmur


Musculoskeletal: negative: CVA Tenderness (R), CVA Tenderness (L)


Extremity: positive: Pelvis Stable, Delayed Capillary Refill (4s).  negative: 

Pedal Edema


Integumentary: positive: Other (generalized flushed skin).  negative: Bruising


Neurologic: positive: CNs II-XII NML intact, Fully Oriented, Alert, Normal Mood/

Affect, Normal Response, Motor Strength 5/5, Respond to painful stimul, 

Responsive.  negative: Numbness, Confused, Disoriented





ED Treatment Course





- LABORATORY


CBC & Chemistry Diagram: 


 10/04/19 12:30





 10/04/19 12:30





Medical Decision Making





- Medical Decision Making





10/04/19 11:58


CBC CMP trop BNP CXR EKG


1L NS, lido patch, tylenol for headache, librium for withdrawl (tremors)


EKG shows NSR, HR 95, QTc 429, no ST changes


CXR shows cardiomegaly, no acute process, unchanged


Head/c-spine CT showed oblique oriented fracture at junction of lateral mass 

and body of C2 with mild step-off, cortical disruption. Subtle nondisplaced fx 

similar location contralateral side. Indeterminate age of fractures, no acute 

brain bleed. Disc space narrowing, face joint arthropathy


lo BNP, neg trop, normal CBC CMP 


In c-collar





---





Armond Charles is a 54yM w PMHx CHF and asthma presenting with back pain s/p fall 

and alcohol intoxication. 





Head/c-spine CT showed oblique oriented fracture at junction of lateral mass 

and body of C2 with mild step-off, cortical disruption. Subtle nondisplaced fx 

similar location contralateral side. Neck supple, not rigid/tender. Placed in C-

collar





Low back pain likely MSK d/t fall. Low concern for cauda equina/spinal stenosis 

(normal BLE sensation, no urinary/bowel movement changes, no deformities 

palpable, minimal tenderness on exam, pt ambulating without assistance in ED).  

No evidence of ACS w negative trop, EKG NSR, no evidence of CHF w low BNP, no 

BLE edema. Given 1L NS, lido patch for back pain, tylenol for headache, librium 

for withdrawl (tremors)





Consulted Dr Spencer neuroskellee regarding C2 fracture. Advised to get c-spine MRI 

w/o contrast





Admit to med/surg hospitalist for C2 fracture


-pending c-spine MRI





Amenable to go to [facility] for alcohol detox after medically cleared





PCP Anu


10/04/19 15:28








Discharge





- Discharge Information


Problems reviewed: Yes


Clinical Impression/Diagnosis: 


Alcohol intoxication


Qualifiers:


 Complication of substance-induced condition: uncomplicated Qualified Code(s): 

F10.920 - Alcohol use, unspecified with intoxication, uncomplicated





Back pain


Qualifiers:


 Back pain location: low back pain Chronicity: acute Back pain laterality: 

midline Sciatica presence: without sciatica Qualified Code(s): M54.5 - Low back 

pain





Condition: Improved


Disposition: HOME





- Admission


No





- Follow up/Referral





- Patient Discharge Instructions


Patient Printed Discharge Instructions:  [facility] for Alcohol Abuse


Additional Instructions: 


You were seen for back pain after falling while drinking alcohol. Your labs and 

imaging did not show anything concerning





Take off your back pain relief patch tonight. Drink lots of water and stay away 

from alcohol





You agreed to go to [facility] for alcohol detox.





Come back to the ED if you have worsening chest pain, trouble breathing, or 

vomiting blood.





- Post Discharge Activity

## 2019-10-04 NOTE — PDOC
Attending Attestation





- Resident


Resident Name: AdriTariq





- ED Attending Attestation


I have performed the following: I have examined & evaluated the patient, The 

case was reviewed & discussed with the resident, I agree w/resident's findings 

& plan, Exceptions are as noted





- HPI


HPI: 





10/04/19 13:44


55 yo male h/o afib, htn pfo. chf, EF 35% on eloquis, etoh abuse, here with c/o 

iintoxication, had fall around 2 am. states he did not hit his head he 

believes. was last sober for 40 days over a year ago. no known h/o liver 

disease or cirrhosis. pt states he would like to go to detox. denies ha, no n/v 

no weaknes, no f/c no cp no sob. last drink was just prior to presenting to ed, 

unsure exactly what time. 





- Physicial Exam


PE: 





10/04/19 13:45


awake alert head atraumatic. no cervical spine tenderness. lungs clear bilat 

heart rrr no mrg abd soft nt nd obese. ext wwp no edema. no calf tendernes. 

alert oriented x 3 speech clear. 





- Medical Decision Making





10/04/19 13:46


55 yo male h/o etoh abuse, chf afib htn her with fall intox requesting detox.


plan ct head cervical spine, labs, if cleared then to [facility]. 


10/04/19 16:20


pt head ct unremarkable.  cervical spine addendum with oblique age 

indeterminate C2 fracture. in collar, admitted after discussion with DR pineda 

who requested MRI spine. pt will require [facility] precautios and would like detox 

from etoh on dc .





**Heart Score/ECG Review


  ** #1


General ECG Interpretation: Sinus Rhythm, Normal Rate (95), Normal Intervals, 

No acute ischemic changes

## 2019-10-04 NOTE — HP
CHIEF COMPLAINT:





PCP: Dr. Anu Thompson- cardiologist





HISTORY OF PRESENT ILLNESS:


Mr. Charles is a 54 year old man with a past medical history of afib (not on 

eliquis), HTN, CHF (EF35%), asthma, and alcohol abuse who presents to the ED s/

p 2 falls in his bedroom while drunk. The patient reports he was in his bedroom 

when he suddenly fell to the floor  "because I was drunk" and then couldn't get 

back up. The patient called his brother who then called an ambulance that 

brought him to [facility]. Patient endorses feeling anxious, having tremors, feeling 

generally weak, and nauseated. Denies any numbness or tingling in his hands/ 

feet, LOC during fall, head trauma, vomiting, chest pains, shortness of breath, 

urinary or fecal incontinence, saddle anesthesia, fever.  





The patient reports that he had previously drank a 12 pack of beer nightly for 

many years however about 3 months ago he lost his job and then started drinking 

a pint of vodka in addition to the beer. 





Note: patient is aware he has afib but states he stopped taking his eliquis 

10mo ago (not on the advice of any doctor) because it was expensive and made 

him bleed. 








ER course was notable for:


(1)EKG shows NSR, HR 95, QTc 429, no ST changes, CXR shows cardiomegaly, no 

acute process, unchanged


(2)Head/c-spine CT showed oblique oriented fracture at junction of lateral mass 

and body of C2 with mild step-off, cortical disruption. Subtle nondisplaced fx 

similar location contralateral side. Indeterminate age of fractures, no acute 

brain bleed. Disc space narrowing, face joint arthropathy


(3)low BNP, neg trop, normal CBC CMP 





Recent Travel: denies





PAST MEDICAL HISTORY: afib (not on eliquis), HTN, CHF (EF35%), asthma, and 

alcohol abuse 





PAST SURGICAL HISTORY: discectomy C6-C7, more than 10 years ago, cardiac 

angiogram and cath at [facility] in november- reports it was normal





Social History:


Smoking: previous 1 pack per week smoker, quit 5 years ago


Alcohol: previously drank 12 pack of beer nightly for years, 2 months ago began 

drinking 1 pint vodka in addition


Drugs: denies





Allergies





No Known Allergies Allergy (Verified 10/13/14 10:16)


 








HOME MEDICATIONS:


 Home Medications











 Medication  Instructions  Recorded


 


Albuterol Sulfate Inhaler - 1 - 2 inh PO QID PRN 10/13/14





[Ventolin HFA Inhaler -]  


 


Budesonide/Formeterol Fumarate 1 inh PO DAILY 11/20/17





[SYMBICORT 160/4.5mcg -]  


 


Amiodarone HCl [Cordarone -] 200 mg PO BID #14 tablet 10/27/18


 


Apixaban [Eliquis -] 5 mg PO BID #60 tablet 10/27/18


 


Carvedilol [Coreg -] 25 mg PO BID #60 tablet 10/27/18


 


Cefuroxime Axetil [Ceftin -] 500 mg PO Q12H #6 tablet 10/27/18


 


Ipratropium 0.02% Nebulizer 1 amp NEB RQID PRN #25 amp 10/27/18





[Atrovent 0.02% Nebulizer -]  


 


Sacubitril/Valsartan [Entresto 24 1 tab PO BID #60 tablet 10/27/18





mg-26 mg Tablet]  


 


predniSONE [Deltasone -] See Taper PO DAILY #30 tablet 10/27/18








REVIEW OF SYSTEMS


CONSTITUTIONAL: generalized weakness


Absent:  fever, chills, diaphoresis, , malaise, loss of appetite, weight change


HEENT: 


Absent:  rhinorrhea, nasal congestion, throat pain, throat swelling, difficulty 

swallowing, mouth swelling, ear pain, eye pain, visual changes


CARDIOVASCULAR: 


Absent: chest pain, syncope, palpitations, irregular heart rate, lightheadedness

, peripheral edema


RESPIRATORY: 


Absent: cough, shortness of breath, dyspnea with exertion, orthopnea, wheezing, 

stridor, hemoptysis


GASTROINTESTINAL:nausea, 


Absent: abdominal pain, abdominal distension, vomiting, diarrhea, constipation, 

melena, hematochezia


GENITOURINARY: 


Absent: dysuria, frequency, urgency, hesitancy, hematuria, flank pain, genital 

pain


MUSCULOSKELETAL: 


Absent: myalgia, arthralgia, joint swelling, back pain, neck pain


SKIN: 


Absent: rash, itching, pallor


HEMATOLOGIC/IMMUNOLOGIC: 


Absent: easy bleeding, easy bruising, lymphadenopathy, frequent infections


ENDOCRINE:


Absent: unexplained weight gain, unexplained weight loss, heat intolerance, 

cold intolerance


NEUROLOGIC: headache,


Absent:  focal weakness or paresthesias, dizziness, unsteady gait, seizure, 

mental status changes, bladder or bowel incontinence


PSYCHIATRIC: anxiety


Absent: , depression, suicidal or homicidal ideation, hallucinations.








PHYSICAL EXAMINATION


 Vital Signs - 24 hr











  10/04/19 10/04/19





  11:19 15:00


 


Temperature 98.2 F 98.5 F


 


Pulse Rate 98 H 


 


Pulse Rate [  82





Left Radial]  


 


Respiratory 19 19





Rate  


 


Blood Pressure 139/82 


 


Blood Pressure  136/78





[Right Arm]  


 


O2 Sat by Pulse 96 100





Oximetry (%)  











GENERAL: Awake, alert, and fully oriented, in no acute distress.


HEAD: Normal with no signs of trauma, wearing C-collar, not complaining of pain 

besides headache


EYES: Pupils equal, round and reactive to light, extraocular movements intact, 

sclera anicteric, conjunctiva clear. No lid lag. Horizontal nystagmus.


EARS, NOSE, THROAT: Ears normal, nares patent, oropharynx clear without 

exudates. Dry mucous membranes.


NECK: in C collar


LUNGS: Breath sounds equal, clear to auscultation bilaterally on the frontal 

lung fields. No wheezes, and no crackles. No accessory muscle use.


HEART: Regular rate and rhythm, normal S1 and S2 without murmur, rub or gallop.


ABDOMEN: Soft, obese, nontender, not distended, normoactive bowel sounds, no 

guarding, no rebound, no masses.  


MUSCULOSKELETAL: Normal range of motion at all joints. No bony deformities or 

tenderness.


UPPER EXTREMITIES: 2+ pulses, warm, well-perfused. No cyanosis. No clubbing. No 

peripheral edema. 1+ reflexes bilaterally, sensation grossly intact, no 

asterixis, no cerbellar signs/ good coordination. 5/5 strength bilaterally.


LOWER EXTREMITIES: 2+ pulses, warm, well-perfused. No calf tenderness. No 

peripheral edema. 1+ reflexes bilaterally, sensation grossly intact, no 

asterixis, no cerbellar signs/ good coordination, 5/5 strength bilaterally.


NEUROLOGICAL:  Cranial nerves II-XII intact. Normal speech. 


PSYCHIATRIC: Cooperative. Good eye contact. Appropriate mood and affect.


SKIN: Warm, dry, normal turgor, no rashes or lesions noted, normal capillary 

refill. 


 Laboratory Results - last 24 hr











  10/04/19 10/04/19 10/04/19





  12:30 12:30 12:30


 


WBC   4.9 


 


RBC   4.87 


 


Hgb   15.3 


 


Hct   45.0 


 


MCV   92.4 


 


MCH   31.5 


 


MCHC   34.1 


 


RDW   14.5  D 


 


Plt Count   237  D 


 


MPV   6.8 L 


 


Absolute Neuts (auto)   2.6 


 


Neutrophils %   52.8  D 


 


Lymphocytes %   33.6  D 


 


Monocytes %   11.1 H 


 


Eosinophils %   1.4  D 


 


Basophils %   1.1 


 


Nucleated RBC %   0 


 


Sodium    138


 


Potassium    4.4


 


Chloride    103


 


Carbon Dioxide    28


 


Anion Gap    7 L


 


BUN    9.4


 


Creatinine    0.6


 


Est GFR (CKD-EPI)AfAm    130.27


 


Est GFR (CKD-EPI)NonAf    112.40


 


Random Glucose    124 H


 


Calcium    9.3


 


Total Bilirubin    0.2


 


AST    27


 


ALT    35


 


Alkaline Phosphatase    71


 


Creatine Kinase  134  


 


Troponin I  < 0.02  


 


B-Natriuretic Peptide   


 


Total Protein    7.8


 


Albumin    4.2


 


Alcohol, Quantitative    372.6 H














  10/04/19





  12:30


 


WBC 


 


RBC 


 


Hgb 


 


Hct 


 


MCV 


 


MCH 


 


MCHC 


 


RDW 


 


Plt Count 


 


MPV 


 


Absolute Neuts (auto) 


 


Neutrophils % 


 


Lymphocytes % 


 


Monocytes % 


 


Eosinophils % 


 


Basophils % 


 


Nucleated RBC % 


 


Sodium 


 


Potassium 


 


Chloride 


 


Carbon Dioxide 


 


Anion Gap 


 


BUN 


 


Creatinine 


 


Est GFR (CKD-EPI)AfAm 


 


Est GFR (CKD-EPI)NonAf 


 


Random Glucose 


 


Calcium 


 


Total Bilirubin 


 


AST 


 


ALT 


 


Alkaline Phosphatase 


 


Creatine Kinase 


 


Troponin I 


 


B-Natriuretic Peptide  29.2


 


Total Protein 


 


Albumin 


 


Alcohol, Quantitative 











ASSESSMENT/PLAN:





Mr. Charles is a 54 year old man with a past medical history of afib (not on 

eliquis), HTN, CHF (EF35%), asthma, and alcohol abuse who presents to the ED s/

p 2 falls in his bedroom while drunk. 





1. Fall 2/2 acute alcohol intoxication


   - Head/c-spine CT showed oblique oriented fracture at junction of lateral 

mass and body of C2 with mild step-off, cortical disruption. Subtle 

nondisplaced fx similar location contralateral side. Neck supple, not rigid/

tender.   


   - Placed in C-collar


   - Neurosurgers (dr. pineda) consulted


   - MRI c-spine


   - lidocaine patch


   - tylenol prn for pain





2. Alcohol withdrawal- no evidence of wernicke's, mild tremor, good 

coordination without cerebellar signs. No asterixis. 


   - [facility] 13


   - stat mag and phos, labs otherwise look OK but patient is chronic alcoholic 

and at risk of refeeding syndrome. If mag and phos normal, can restart Na/ fat/ 

cholesterol controlled diet. 


   - Librium protocol for withdrawal


   - PO thiamine daily


   - PO folate daily


   - PO multivitamin


   - careful rehydration with NS fluid boluses because the patient has hx of 

CHFrEF (35%) and is at risk of becoming volume overloaded


   - Protonix 40





3. CHF


   - resume home medications


   - careful rehydration to avoid vol overload


4. Afib


   - will hold AC for now pending results of MRI incase patient needs procedure


   - will consider resuming after result of MRI





FEN


-500L NS bolus now, reassess vol status in AM


- replete lytes PRN, if K+, Mag, Phos normal resume diet


- NPO until lytes return, resume diet if normal





Dispo: admit to med-surg. Patient amenable to go to [facility] for alcohol detox 

after medically cleared








Visit type





- Emergency Visit


Emergency Visit: Yes


ED Registration Date: 10/04/19


Care time: The patient presented to the Emergency Department on the above date 

and was hospitalized for further evaluation of their emergent condition.





- New Patient


This patient is new to me today: Yes


Date on this admission: 10/04/19





- Critical Care


Critical Care patient: No





ATTENDING PHYSICIAN STATEMENT





I saw and evaluated the patient.


I reviewed the resident's note and discussed the case with the resident.


I agree with the resident's findings and plan as documented.








SUBJECTIVE:








OBJECTIVE:








ASSESSMENT AND PLAN:

## 2019-10-05 LAB
ALBUMIN SERPL-MCNC: 4.2 G/DL (ref 3.4–5)
ALP SERPL-CCNC: 69 U/L (ref 45–117)
ALT SERPL-CCNC: 30 U/L (ref 13–61)
AMYLASE SERPL-CCNC: 151 U/L (ref 25–115)
ANION GAP SERPL CALC-SCNC: 8 MMOL/L (ref 8–16)
AST SERPL-CCNC: 24 U/L (ref 15–37)
BASOPHILS # BLD: 0.8 % (ref 0–2)
BILIRUB SERPL-MCNC: 0.8 MG/DL (ref 0.2–1)
BUN SERPL-MCNC: 10.6 MG/DL (ref 7–18)
CALCIUM SERPL-MCNC: 8.8 MG/DL (ref 8.5–10.1)
CHLORIDE SERPL-SCNC: 100 MMOL/L (ref 98–107)
CO2 SERPL-SCNC: 27 MMOL/L (ref 21–32)
CREAT SERPL-MCNC: 0.9 MG/DL (ref 0.55–1.3)
DEPRECATED RDW RBC AUTO: 14.7 % (ref 11.9–15.9)
EOSINOPHIL # BLD: 1.2 % (ref 0–4.5)
GLUCOSE SERPL-MCNC: 146 MG/DL (ref 74–106)
HCT VFR BLD CALC: 40.7 % (ref 35.4–49)
HGB BLD-MCNC: 14.1 GM/DL (ref 11.7–16.9)
INR BLD: 0.97 (ref 0.83–1.09)
LYMPHOCYTES # BLD: 15.9 % (ref 8–40)
MAGNESIUM SERPL-MCNC: 1.9 MG/DL (ref 1.8–2.4)
MCH RBC QN AUTO: 31.8 PG (ref 25.7–33.7)
MCHC RBC AUTO-ENTMCNC: 34.7 G/DL (ref 32–35.9)
MCV RBC: 91.9 FL (ref 80–96)
MONOCYTES # BLD AUTO: 12.3 % (ref 3.8–10.2)
NEUTROPHILS # BLD: 69.8 % (ref 42.8–82.8)
PHOSPHATE SERPL-MCNC: 2.1 MG/DL (ref 2.5–4.9)
PLATELET # BLD AUTO: 219 K/MM3 (ref 134–434)
PMV BLD: 6.8 FL (ref 7.5–11.1)
POTASSIUM SERPLBLD-SCNC: 3.5 MMOL/L (ref 3.5–5.1)
PROT SERPL-MCNC: 7.4 G/DL (ref 6.4–8.2)
PT PNL PPP: 11.4 SEC (ref 9.7–13)
RBC # BLD AUTO: 4.43 M/MM3 (ref 4–5.6)
SODIUM SERPL-SCNC: 135 MMOL/L (ref 136–145)
WBC # BLD AUTO: 7.8 K/MM3 (ref 4–10)

## 2019-10-05 RX ADMIN — IPRATROPIUM BROMIDE SCH AMP: 0.5 SOLUTION RESPIRATORY (INHALATION) at 15:54

## 2019-10-05 RX ADMIN — HEPARIN SODIUM SCH UNIT: 5000 INJECTION, SOLUTION INTRAVENOUS; SUBCUTANEOUS at 05:30

## 2019-10-05 RX ADMIN — Medication SCH MG: at 10:06

## 2019-10-05 RX ADMIN — IBUPROFEN PRN MG: 600 TABLET, FILM COATED ORAL at 23:24

## 2019-10-05 RX ADMIN — SACUBITRIL AND VALSARTAN SCH TAB: 24; 26 TABLET, FILM COATED ORAL at 10:04

## 2019-10-05 RX ADMIN — SACUBITRIL AND VALSARTAN SCH TAB: 24; 26 TABLET, FILM COATED ORAL at 21:43

## 2019-10-05 RX ADMIN — HEPARIN SODIUM SCH UNIT: 5000 INJECTION, SOLUTION INTRAVENOUS; SUBCUTANEOUS at 21:14

## 2019-10-05 RX ADMIN — IPRATROPIUM BROMIDE SCH AMP: 0.5 SOLUTION RESPIRATORY (INHALATION) at 12:04

## 2019-10-05 RX ADMIN — IPRATROPIUM BROMIDE SCH AMP: 0.5 SOLUTION RESPIRATORY (INHALATION) at 20:12

## 2019-10-05 RX ADMIN — HEPARIN SODIUM SCH UNIT: 5000 INJECTION, SOLUTION INTRAVENOUS; SUBCUTANEOUS at 13:26

## 2019-10-05 RX ADMIN — FOLIC ACID SCH MG: 1 TABLET ORAL at 10:05

## 2019-10-05 RX ADMIN — MULTIVITAMIN TABLET SCH TAB: TABLET at 10:06

## 2019-10-05 RX ADMIN — CARVEDILOL SCH MG: 12.5 TABLET, FILM COATED ORAL at 21:14

## 2019-10-05 RX ADMIN — BUDESONIDE AND FORMOTEROL FUMARATE DIHYDRATE SCH PUFF: 160; 4.5 AEROSOL RESPIRATORY (INHALATION) at 10:06

## 2019-10-05 RX ADMIN — IBUPROFEN PRN MG: 600 TABLET, FILM COATED ORAL at 10:06

## 2019-10-05 RX ADMIN — CARVEDILOL SCH MG: 12.5 TABLET, FILM COATED ORAL at 10:04

## 2019-10-05 RX ADMIN — AMIODARONE HYDROCHLORIDE SCH MG: 200 TABLET ORAL at 10:04

## 2019-10-05 RX ADMIN — PANTOPRAZOLE SODIUM SCH MG: 40 TABLET, DELAYED RELEASE ORAL at 10:06

## 2019-10-05 RX ADMIN — ACETAMINOPHEN PRN MG: 325 TABLET ORAL at 05:31

## 2019-10-05 RX ADMIN — LIDOCAINE SCH PATCH: 50 PATCH TOPICAL at 10:05

## 2019-10-05 RX ADMIN — Medication SCH EACH: at 21:14

## 2019-10-05 RX ADMIN — AMIODARONE HYDROCHLORIDE SCH MG: 200 TABLET ORAL at 21:14

## 2019-10-05 RX ADMIN — IBUPROFEN PRN MG: 600 TABLET, FILM COATED ORAL at 17:23

## 2019-10-05 NOTE — PN
Physical Exam: 


SUBJECTIVE: Patient seen and examined at the bedside. Patient reports he has 

left sided abdominal pain that radiates toward the mid epigastrum. Patient 

reports he had this pain prior to his fall/ hospitalization. 








OBJECTIVE:





 Vital Signs











 Period  Temp  Pulse  Resp  BP Sys/Tobias  Pulse Ox


 


 Last 24 Hr  97.5 F-99.4 F    19-21  127-160/  











GENERAL: The patient is awake, alert, and fully oriented, in no acute distress.


HEAD: Normal with no signs of trauma.


EYES: PERRL, extraocular movements intact, sclera anicteric, conjunctiva clear. 

No ptosis. bilateral horizontal nystagmus 


ENT: Ears normal, nares patent, oropharynx clear without exudates, moist mucous 

membranes.


NECK: C-collar in place


LUNGS: Breath sounds equal, clear to auscultation bilaterally, no wheezes, no 

crackles, no 


accessory muscle use. 


HEART: Regular rate and rhythm, S1, S2 without murmur, rub or gallop.


ABDOMEN: Soft, pbese, tender to palpation in the LUQ, nondistended, normoactive 

bowel sounds


EXTREMITIES: 2+ pulses, warm, well-perfused, no edema, bilateral tremor noted 

in hands


NEUROLOGICAL: Cranial nerves II through XII grossly intact. Normal speech, gait 

not observed.


PSYCH: Normal mood, normal affect.


SKIN: Warm, dry, normal turgor, no rashes or lesions noted











 Laboratory Results - last 24 hr











  10/04/19 10/04/19 10/04/19





  12:30 12:30 19:00


 


WBC   


 


RBC   


 


Hgb   


 


Hct   


 


MCV   


 


MCH   


 


MCHC   


 


RDW   


 


Plt Count   


 


MPV   


 


Absolute Neuts (auto)   


 


Neutrophils %   


 


Lymphocytes %   


 


Monocytes %   


 


Eosinophils %   


 


Basophils %   


 


Nucleated RBC %   


 


PT with INR   


 


INR   


 


Sodium  138  


 


Potassium  4.4  


 


Chloride  103  


 


Carbon Dioxide  28  


 


Anion Gap  7 L  


 


BUN  9.4  


 


Creatinine  0.6  


 


Est GFR (CKD-EPI)AfAm  130.27  


 


Est GFR (CKD-EPI)NonAf  112.40  


 


Random Glucose  124 H  


 


Calcium  9.3  


 


Phosphorus   Cancelled  3.0


 


Magnesium   Cancelled  2.0


 


Total Bilirubin  0.2  


 


AST  27  


 


ALT  35  


 


Alkaline Phosphatase  71  


 


B-Natriuretic Peptide   29.2 


 


Total Protein  7.8  


 


Albumin  4.2  


 


Alcohol, Quantitative  372.6 H  














  10/05/19 10/05/19 10/05/19





  09:00 09:00 09:00


 


WBC  7.8  


 


RBC  4.43  


 


Hgb  14.1  


 


Hct  40.7  


 


MCV  91.9  


 


MCH  31.8  


 


MCHC  34.7  


 


RDW  14.7  


 


Plt Count  219  


 


MPV  6.8 L  


 


Absolute Neuts (auto)  5.5  


 


Neutrophils %  69.8  D  


 


Lymphocytes %  15.9  D  


 


Monocytes %  12.3 H  


 


Eosinophils %  1.2  


 


Basophils %  0.8  


 


Nucleated RBC %  0  


 


PT with INR   11.40 


 


INR   0.97 


 


Sodium    135 L


 


Potassium    3.5


 


Chloride    100


 


Carbon Dioxide    27


 


Anion Gap    8


 


BUN    10.6


 


Creatinine    0.9


 


Est GFR (CKD-EPI)AfAm    110.27


 


Est GFR (CKD-EPI)NonAf    95.14


 


Random Glucose    146 H


 


Calcium    8.8


 


Phosphorus    2.1 L


 


Magnesium    1.9


 


Total Bilirubin    0.8


 


AST    24


 


ALT    30


 


Alkaline Phosphatase    69


 


B-Natriuretic Peptide   


 


Total Protein    7.4


 


Albumin    4.2


 


Alcohol, Quantitative   








Active Medications











Generic Name Dose Route Start Last Admin





  Trade Name Freq  PRN Reason Stop Dose Admin


 


Acetaminophen  650 mg  10/04/19 17:28  10/05/19 05:31





  Tylenol -  PO   650 mg





  Q6H PRN   Administration





  Fever Or Pain   





     





     





     


 


Albuterol Sulfate  2 puff  10/05/19 08:04  





  Ventolin Hfa Inhaler -  IH   





  Q6H PRN   





  SHORT OF BREATH/WHEEZING   





     





     





     


 


Amiodarone HCl  200 mg  10/05/19 10:00  10/05/19 10:04





  Cordarone -  PO   200 mg





  BID RAEGAN   Administration





     





     





     





     


 


Budesonide/Formoterol Fumarate  1 puff  10/05/19 10:00  10/05/19 10:06





  Symbicort 160/4.5mcg -  IH   1 puff





  DAILY RAEGAN   Administration





     





     





     





     


 


Carvedilol  12.5 mg  10/05/19 10:00  10/05/19 10:04





  Coreg -  PO   12.5 mg





  BID RAEGAN   Administration





     





     





     





     


 


Chlordiazepoxide HCl  10 mg  10/07/19 00:00  





  Librium -  PO  10/07/19 23:59  





  Q12H PRN   





  Signs/symptoms of Withdrawal   





     





     





     


 


Chlordiazepoxide HCl  10 mg  10/04/19 17:17  





  Librium -  PO  10/06/19 23:59  





  Q8H PRN   





  Signs/symptoms of Withdrawal   





     





     





     


 


Chlordiazepoxide HCl  25 mg  10/04/19 21:00  10/05/19 13:26





  Librium -  PO  10/05/19 21:01  25 mg





  Q8H RAEGAN   Administration





     





     





     





     


 


Chlordiazepoxide HCl  15 mg  10/06/19 05:00  





  Librium -  PO  10/06/19 21:01  





  Q8H RAEGAN   





     





     





     





     


 


Chlordiazepoxide HCl  10 mg  10/07/19 05:00  





  Librium -  PO  10/07/19 21:01  





  Q8H RAEGAN   





     





     





     





     


 


Chlordiazepoxide HCl  10 mg  10/08/19 05:00  





  Librium -  PO  10/08/19 05:01  





  ONCE ONE   





     





     





     





     


 


Folic Acid  1 mg  10/05/19 10:00  10/05/19 10:05





  Folic Acid -  PO   1 mg





  DAILY RAEGAN   Administration





     





     





     





     


 


Heparin Sodium (Porcine)  5,000 unit  10/04/19 22:00  10/05/19 13:26





  Heparin -  SQ   5,000 unit





  TID RAEGAN   Administration





     





     





     





     


 


Sodium Chloride  1,000 mls @ 50 mls/hr  10/04/19 18:00  10/04/19 18:45





  Normal Saline -  IV  10/05/19 17:58  50 mls/hr





  ASDIR RAEGAN   Administration





     





     





     





     


 


Ibuprofen  600 mg  10/05/19 08:58  10/05/19 10:06





  Motrin -  PO   600 mg





  Q6H PRN   Administration





  PAIN LEVEL 4 - 6   





     





     





     


 


Ipratropium Bromide  1 amp  10/05/19 12:00  10/05/19 12:04





  Atrovent 0.02% Nebulizer -  NEB   1 amp





  RQID RAEGAN   Administration





     





     





     





     


 


Lidocaine  1 patch  10/04/19 17:45  10/05/19 10:05





  Lidoderm Patch -  TP   1 patch





  DAILY RAEGAN   Administration





     





     





     





     


 


Miscellaneous  1 each  10/04/19 22:00  10/04/19 21:11





  Lidoderm Patch Removal  MC   1 each





  DAILY@2200 RAEGAN   Administration





     





     





     





     


 


Multivitamins/Minerals/Vitamin C  1 tab  10/05/19 10:00  10/05/19 10:06





  Tab-A-Vit -  PO   1 tab





  DAILY RAEGAN   Administration





     





     





     





     


 


Pantoprazole Sodium  40 mg  10/05/19 10:00  10/05/19 10:06





  Protonix -  PO   40 mg





  DAILY RAEGAN   Administration





     





     





     





     


 


Sacubitril/Valsartan  1 tab  10/05/19 10:00  10/05/19 10:04





  Entresto 24 Mg-26 Mg Tablet  PO   1 tab





  BID RAEGAN   Administration





     





     





     





     


 


Thiamine HCl  100 mg  10/05/19 10:00  10/05/19 10:06





  Vitamin B1 -  PO   100 mg





  DAILY RAEGAN   Administration





     





     





     





     














Imaging:


- Head/c-spine CT showed oblique oriented fracture at junction of lateral mass 

and body of C2 with mild step-off, cortical disruption. Subtle nondisplaced fx 

similar location contralateral side. Neck supple, not rigid/tender.





ASSESSMENT/PLAN:


Mr. Charles is a 54 year old man with a past medical history of afib (not on 

eliquis), HTN, CHF (EF35%), asthma, and alcohol abuse who presents to the ED s/

p 2 falls in his bedroom while drunk. 





1. Fall 2/2 acute alcohol intoxication with resultant bilateral C2 Fx on CT 

scan 


   - continue C-collar (hard)


   - Neurosurgers (dr. pineda) consulted


   - MRI c-spine still pending


   - lidocaine patch


   - tylenol prn for pain


   - ibuprofen prn for pain





2. Alcohol withdrawal- no evidence of wernicke's, mild tremor, good 

coordination without cerebellar signs. No asterixis. 


   - Continue librium protocol for withdrawal


   - PO thiamine daily


   - PO folate daily


   - PO multivitamin


   - replete phosphate prn


   - patient tolerated IVF without becoming vol overload, encourage PO fluid 

intake


   - Protonix 40





3. L sided abdominal pain radiating to epigastrum


   - check lipase


   - f/u L spine xray to r/out referred pain from L spine to L flank





4. CHF


   - resume home medications once confirmed with pharmacy


   - resuming coreg and entresto


   - careful rehydration to avoid vol overload


5. Afib


   - will hold AC for now pending results of MRI incase patient needs procedure


   - will consider resuming after result of MRI


   - continuing coreg for now





FEN


- Encourage PO fluid intake


- replete lytes PRN


- low fat diet





DVT px :  SQ heparin. can hold for any procedure





Dispo: admit to med-surg. Patient amenable to go to [facility] for alcohol detox 

after medically cleared





Visit type





- Emergency Visit


Emergency Visit: Yes


ED Registration Date: 10/04/19


Care time: The patient presented to the Emergency Department on the above date 

and was hospitalized for further evaluation of their emergent condition.





- New Patient


This patient is new to me today: No





- Critical Care


Critical Care patient: No





- Discharge Referral


Referred to [facility] No





ATTENDING PHYSICIAN STATEMENT





I saw and evaluated the patient.


I reviewed the resident's note and discussed the case with the resident.


I agree with the resident's findings and plan as documented.








SUBJECTIVE:








OBJECTIVE:








ASSESSMENT AND PLAN:

## 2019-10-05 NOTE — EKG
Test Reason : 

Blood Pressure : ***/*** mmHG

Vent. Rate : 095 BPM     Atrial Rate : 095 BPM

   P-R Int : 186 ms          QRS Dur : 094 ms

    QT Int : 342 ms       P-R-T Axes : 056 -13 042 degrees

   QTc Int : 429 ms

 

NORMAL SINUS RHYTHM

INFERIOR INFARCT (CITED ON OR BEFORE 21-OCT-2018)

ABNORMAL ECG

Confirmed by HYACINTH SCHWARZ MD ([id number]) on 10/5/2019 10:00:20 AM

 

Referred By:             Confirmed By:HYACINTH SCHWARZ MD

## 2019-10-05 NOTE — CONSULT
Consult - text type





- Consultation


Consultation Note: 





NEUROSURGERY CONSULTATION





Armond Charles is a 54 year old  male who has a history of Atrial 

Fibrillation, HTN and CHF as well as daily EtOH consumption. He fell several 

days ago while intoxicated and has been having increased neck pain since this 

time. He remains at her Neurological baseline. CT demonstrates non-displaced 

fractures of the C2 vertebral body vertically through the lateral articular 

pillars.





Patient is awake and alert on exam and complies with examination. Given his non-

displaced fractures and lack of deficits, non-operative management would be an 

appropriate initial plan of care. Patient given Cervical collar with some 

control of his neck pain. Patient reports a pain in his chest wall on the Left 

side which has been there for 3 months and does not increase or decrease in 

intensity with activities or position.





No acute Neurosurgical intervention indicated or planned.





- GI/DVT prophylaxis


- Fracture treatment with cervical orthosis


- Physical Therapy


- Will follow


- If no etiology for his Chest wall pain is identified, will consider 

evaluation of Thoracic spine for potential nerve root compression

## 2019-10-05 NOTE — PN
Teaching Attending Note


Name of Resident: Dari Gibson





ATTENDING PHYSICIAN STATEMENT





I saw and evaluated the patient.


I reviewed the resident's note and discussed the case with the resident.


I agree with the resident's findings and plan as documented.








SUBJECTIVE:


No fever or chills. has pain in LUQ , which was intermitted for few weeks prior 

to admission . radiated like a belt to L flank 


no dysuria , no hematuria . feels anxious 





OBJECTIVE:





NAD , awake, alert, and oriented .


HEENT: MMM. . b/l horizontal nystagmus 


CV: RRR, NO MRG 


Lungs: CATB 


Abd: : obese, soft, mild TTP in LUQ, ND , NL BS. 


Ext : No edema , no erythema, tremor in hands 


MS: No TTP over spine or paraspinal muscles in L area 





ASSESSMENT AND PLAN:





 53 y/o man with h/o P AFib/A flutter, non ischemic cardiomyopathy, PFO, HTN, 

ETOh abuse, Asthma, who presented with a fall  while intoxicated. he was found 

to have C2 Fx and ETOH Withdrawal 





1-S/p fall with resultant  B/l C2 Fx on CT scan : 


- Cont hard collar. 


- MRI of C spine pending 


- tylenol and add ibuprofen for pain 


- neuro sx eval pending 





2- ETOH WD: No signs of Wernicke's. 


- cont librium protocol 


- Cont  folate, thiamine , and MVT. 


- replete hypophosphatemia 





3- H/o chronic Systolic heart failure: 


- tolerated fluids, will end in 24 hr from start. 


- cont oral hydration 


-cont to try to reach is pharmacy to confirm meds 





4- H/o AFib/A flutter. now in sinus. He is not compliant with his eliquis 


- will hold eliquis until decision on any procedure for C2 Fx 


- resume coreg 





5- HTn: resume coreg and Entresto , pending confirmation of his meds 





6- Abd pain: check lipase. if neg will check L spine xray to r/o referred pain 

from L spine to L flank 





7-DVT px :  SQ heparin. can hold for any procedure

## 2019-10-06 LAB
ALBUMIN SERPL-MCNC: 4 G/DL (ref 3.4–5)
ALP SERPL-CCNC: 66 U/L (ref 45–117)
ALT SERPL-CCNC: 30 U/L (ref 13–61)
ANION GAP SERPL CALC-SCNC: 8 MMOL/L (ref 8–16)
AST SERPL-CCNC: 26 U/L (ref 15–37)
BASOPHILS # BLD: 1.3 % (ref 0–2)
BILIRUB SERPL-MCNC: 1.2 MG/DL (ref 0.2–1)
BUN SERPL-MCNC: 13.6 MG/DL (ref 7–18)
CALCIUM SERPL-MCNC: 9.2 MG/DL (ref 8.5–10.1)
CHLORIDE SERPL-SCNC: 101 MMOL/L (ref 98–107)
CO2 SERPL-SCNC: 28 MMOL/L (ref 21–32)
CREAT SERPL-MCNC: 0.8 MG/DL (ref 0.55–1.3)
DEPRECATED RDW RBC AUTO: 14.6 % (ref 11.9–15.9)
EOSINOPHIL # BLD: 11 % (ref 0–4.5)
GLUCOSE SERPL-MCNC: 99 MG/DL (ref 74–106)
HCT VFR BLD CALC: 40 % (ref 35.4–49)
HGB BLD-MCNC: 13.7 GM/DL (ref 11.7–16.9)
LYMPHOCYTES # BLD: 21.6 % (ref 8–40)
MAGNESIUM SERPL-MCNC: 2.2 MG/DL (ref 1.8–2.4)
MCH RBC QN AUTO: 31.8 PG (ref 25.7–33.7)
MCHC RBC AUTO-ENTMCNC: 34.1 G/DL (ref 32–35.9)
MCV RBC: 93.1 FL (ref 80–96)
MONOCYTES # BLD AUTO: 12.7 % (ref 3.8–10.2)
NEUTROPHILS # BLD: 53.4 % (ref 42.8–82.8)
PHOSPHATE SERPL-MCNC: 2.7 MG/DL (ref 2.5–4.9)
PLATELET # BLD AUTO: 181 K/MM3 (ref 134–434)
PMV BLD: 7.2 FL (ref 7.5–11.1)
POTASSIUM SERPLBLD-SCNC: 3.5 MMOL/L (ref 3.5–5.1)
PROT SERPL-MCNC: 7.2 G/DL (ref 6.4–8.2)
RBC # BLD AUTO: 4.3 M/MM3 (ref 4–5.6)
SODIUM SERPL-SCNC: 136 MMOL/L (ref 136–145)
TRIGL SERPL-MCNC: 160 MG/DL (ref 0–150)
WBC # BLD AUTO: 5.1 K/MM3 (ref 4–10)

## 2019-10-06 RX ADMIN — MORPHINE SULFATE PRN MG: 2 INJECTION, SOLUTION INTRAMUSCULAR; INTRAVENOUS at 13:12

## 2019-10-06 RX ADMIN — ACETAMINOPHEN PRN MG: 325 TABLET ORAL at 09:48

## 2019-10-06 RX ADMIN — AMIODARONE HYDROCHLORIDE SCH MG: 200 TABLET ORAL at 21:38

## 2019-10-06 RX ADMIN — Medication SCH MG: at 09:47

## 2019-10-06 RX ADMIN — IPRATROPIUM BROMIDE SCH AMP: 0.5 SOLUTION RESPIRATORY (INHALATION) at 12:00

## 2019-10-06 RX ADMIN — SODIUM CHLORIDE, POTASSIUM CHLORIDE, SODIUM LACTATE AND CALCIUM CHLORIDE SCH MLS/HR: 600; 310; 30; 20 INJECTION, SOLUTION INTRAVENOUS at 17:07

## 2019-10-06 RX ADMIN — SACUBITRIL AND VALSARTAN SCH TAB: 24; 26 TABLET, FILM COATED ORAL at 21:37

## 2019-10-06 RX ADMIN — SACUBITRIL AND VALSARTAN SCH TAB: 24; 26 TABLET, FILM COATED ORAL at 09:54

## 2019-10-06 RX ADMIN — MORPHINE SULFATE PRN MG: 2 INJECTION, SOLUTION INTRAMUSCULAR; INTRAVENOUS at 21:48

## 2019-10-06 RX ADMIN — IPRATROPIUM BROMIDE SCH AMP: 0.5 SOLUTION RESPIRATORY (INHALATION) at 19:37

## 2019-10-06 RX ADMIN — SODIUM CHLORIDE, POTASSIUM CHLORIDE, SODIUM LACTATE AND CALCIUM CHLORIDE SCH MLS/HR: 600; 310; 30; 20 INJECTION, SOLUTION INTRAVENOUS at 09:44

## 2019-10-06 RX ADMIN — IPRATROPIUM BROMIDE SCH AMP: 0.5 SOLUTION RESPIRATORY (INHALATION) at 15:52

## 2019-10-06 RX ADMIN — PANTOPRAZOLE SODIUM SCH MG: 40 TABLET, DELAYED RELEASE ORAL at 09:53

## 2019-10-06 RX ADMIN — HEPARIN SODIUM SCH UNIT: 5000 INJECTION, SOLUTION INTRAVENOUS; SUBCUTANEOUS at 13:13

## 2019-10-06 RX ADMIN — FOLIC ACID SCH MG: 1 TABLET ORAL at 09:53

## 2019-10-06 RX ADMIN — IPRATROPIUM BROMIDE SCH AMP: 0.5 SOLUTION RESPIRATORY (INHALATION) at 08:28

## 2019-10-06 RX ADMIN — HEPARIN SODIUM SCH UNIT: 5000 INJECTION, SOLUTION INTRAVENOUS; SUBCUTANEOUS at 05:20

## 2019-10-06 RX ADMIN — Medication SCH EACH: at 21:43

## 2019-10-06 RX ADMIN — CARVEDILOL SCH MG: 12.5 TABLET, FILM COATED ORAL at 09:53

## 2019-10-06 RX ADMIN — HEPARIN SODIUM SCH UNIT: 5000 INJECTION, SOLUTION INTRAVENOUS; SUBCUTANEOUS at 21:44

## 2019-10-06 RX ADMIN — CARVEDILOL SCH MG: 12.5 TABLET, FILM COATED ORAL at 21:38

## 2019-10-06 RX ADMIN — BUDESONIDE AND FORMOTEROL FUMARATE DIHYDRATE SCH PUFF: 160; 4.5 AEROSOL RESPIRATORY (INHALATION) at 09:54

## 2019-10-06 RX ADMIN — IBUPROFEN PRN MG: 600 TABLET, FILM COATED ORAL at 09:52

## 2019-10-06 RX ADMIN — MULTIVITAMIN TABLET SCH TAB: TABLET at 09:53

## 2019-10-06 RX ADMIN — LIDOCAINE SCH PATCH: 50 PATCH TOPICAL at 09:54

## 2019-10-06 RX ADMIN — AMIODARONE HYDROCHLORIDE SCH MG: 200 TABLET ORAL at 09:53

## 2019-10-07 LAB
ANION GAP SERPL CALC-SCNC: 9 MMOL/L (ref 8–16)
BUN SERPL-MCNC: 11.7 MG/DL (ref 7–18)
CALCIUM SERPL-MCNC: 8.7 MG/DL (ref 8.5–10.1)
CHLORIDE SERPL-SCNC: 103 MMOL/L (ref 98–107)
CO2 SERPL-SCNC: 26 MMOL/L (ref 21–32)
CREAT SERPL-MCNC: 0.7 MG/DL (ref 0.55–1.3)
GLUCOSE SERPL-MCNC: 78 MG/DL (ref 74–106)
MAGNESIUM SERPL-MCNC: 2.1 MG/DL (ref 1.8–2.4)
PHOSPHATE SERPL-MCNC: 3.8 MG/DL (ref 2.5–4.9)
POTASSIUM SERPLBLD-SCNC: 3.2 MMOL/L (ref 3.5–5.1)
SODIUM SERPL-SCNC: 138 MMOL/L (ref 136–145)

## 2019-10-07 RX ADMIN — PANTOPRAZOLE SODIUM SCH MG: 40 TABLET, DELAYED RELEASE ORAL at 10:26

## 2019-10-07 RX ADMIN — MORPHINE SULFATE PRN MG: 2 INJECTION, SOLUTION INTRAMUSCULAR; INTRAVENOUS at 08:36

## 2019-10-07 RX ADMIN — IPRATROPIUM BROMIDE SCH AMP: 0.5 SOLUTION RESPIRATORY (INHALATION) at 16:57

## 2019-10-07 RX ADMIN — HEPARIN SODIUM SCH UNIT: 5000 INJECTION, SOLUTION INTRAVENOUS; SUBCUTANEOUS at 22:36

## 2019-10-07 RX ADMIN — SACUBITRIL AND VALSARTAN SCH TAB: 24; 26 TABLET, FILM COATED ORAL at 10:26

## 2019-10-07 RX ADMIN — IPRATROPIUM BROMIDE SCH AMP: 0.5 SOLUTION RESPIRATORY (INHALATION) at 12:28

## 2019-10-07 RX ADMIN — Medication SCH MG: at 10:26

## 2019-10-07 RX ADMIN — LIDOCAINE SCH PATCH: 50 PATCH TOPICAL at 10:25

## 2019-10-07 RX ADMIN — CARVEDILOL SCH MG: 12.5 TABLET, FILM COATED ORAL at 10:26

## 2019-10-07 RX ADMIN — Medication SCH EACH: at 22:36

## 2019-10-07 RX ADMIN — MORPHINE SULFATE PRN MG: 2 INJECTION, SOLUTION INTRAMUSCULAR; INTRAVENOUS at 02:19

## 2019-10-07 RX ADMIN — BUDESONIDE AND FORMOTEROL FUMARATE DIHYDRATE SCH PUFF: 160; 4.5 AEROSOL RESPIRATORY (INHALATION) at 10:28

## 2019-10-07 RX ADMIN — SODIUM CHLORIDE, POTASSIUM CHLORIDE, SODIUM LACTATE AND CALCIUM CHLORIDE SCH MLS/HR: 600; 310; 30; 20 INJECTION, SOLUTION INTRAVENOUS at 00:03

## 2019-10-07 RX ADMIN — MULTIVITAMIN TABLET SCH TAB: TABLET at 10:26

## 2019-10-07 RX ADMIN — MORPHINE SULFATE PRN MG: 2 INJECTION, SOLUTION INTRAMUSCULAR; INTRAVENOUS at 22:54

## 2019-10-07 RX ADMIN — IPRATROPIUM BROMIDE SCH AMP: 0.5 SOLUTION RESPIRATORY (INHALATION) at 08:45

## 2019-10-07 RX ADMIN — CARVEDILOL SCH MG: 12.5 TABLET, FILM COATED ORAL at 22:35

## 2019-10-07 RX ADMIN — AMIODARONE HYDROCHLORIDE SCH MG: 200 TABLET ORAL at 10:26

## 2019-10-07 RX ADMIN — MORPHINE SULFATE PRN MG: 2 INJECTION, SOLUTION INTRAMUSCULAR; INTRAVENOUS at 18:31

## 2019-10-07 RX ADMIN — FOLIC ACID SCH MG: 1 TABLET ORAL at 10:26

## 2019-10-07 RX ADMIN — SODIUM CHLORIDE, POTASSIUM CHLORIDE, SODIUM LACTATE AND CALCIUM CHLORIDE SCH MLS/HR: 600; 310; 30; 20 INJECTION, SOLUTION INTRAVENOUS at 10:28

## 2019-10-07 RX ADMIN — HEPARIN SODIUM SCH UNIT: 5000 INJECTION, SOLUTION INTRAVENOUS; SUBCUTANEOUS at 05:54

## 2019-10-07 RX ADMIN — AMIODARONE HYDROCHLORIDE SCH MG: 200 TABLET ORAL at 22:35

## 2019-10-07 RX ADMIN — HEPARIN SODIUM SCH UNIT: 5000 INJECTION, SOLUTION INTRAVENOUS; SUBCUTANEOUS at 13:37

## 2019-10-07 RX ADMIN — SACUBITRIL AND VALSARTAN SCH TAB: 24; 26 TABLET, FILM COATED ORAL at 22:36

## 2019-10-07 NOTE — PN
Physical Exam: 


SUBJECTIVE: Patient seen and examined at the bedside, there were no acute 

events overnight. Appears comfortable in bed.








OBJECTIVE:





 Vital Signs











 Period  Temp  Pulse  Resp  BP Sys/Tobias  Pulse Ox


 


 Last 24 Hr  97.6 F-97.7 F  80-87  18-20  140-152/90-97  98











GENERAL: The patient is awake, alert, and fully oriented, in no acute distress.


HEAD: Normal with no signs of trauma.


EYES: PERRL, extraocular movements intact, sclera anicteric, conjunctiva clear. 

No ptosis. horizontal nystagmus present but improving


ENT: Ears normal, nares patent, oropharynx clear without exudates, moist mucous 

membranes.


NECK: C-collar in place


LUNGS: Breath sounds equal, clear to auscultation bilaterally, no wheezes, some 

faint scattered wheezes in the inferior lung fields, no 


accessory muscle use. 


HEART: Regular rate and rhythm, S1, S2 without murmur, rub or gallop, no JVD


ABDOMEN: Soft, pbese, tender to palpation in the LUQ, nondistended, normoactive 

bowel sounds


EXTREMITIES: 2+ pulses, warm, well-perfused, no edema, tremor not perceptible 

but able to be felt finger tip to finger tip.


NEUROLOGICAL: Cranial nerves II through XII grossly intact. Normal speech, gait 

not observed.


PSYCH: Normal mood, normal affect.


SKIN: Warm, dry, normal turgor, no rashes or lesions noted











 Laboratory Results - last 24 hr











  10/07/19





  08:00


 


Sodium  138


 


Potassium  3.2 L


 


Chloride  103


 


Carbon Dioxide  26


 


Anion Gap  9


 


BUN  11.7


 


Creatinine  0.7


 


Est GFR (CKD-EPI)AfAm  122.27


 


Est GFR (CKD-EPI)NonAf  105.50


 


Random Glucose  78


 


Calcium  8.7


 


Phosphorus  3.8


 


Magnesium  2.1








Active Medications











Generic Name Dose Route Start Last Admin





  Trade Name Freq  PRN Reason Stop Dose Admin


 


Acetaminophen  650 mg  10/06/19 10:35  





  Tylenol -  PO   





  Q6H PRN   





  PAIN LEVEL 1-5   





     





     





     


 


Albuterol Sulfate  2 puff  10/05/19 08:04  





  Ventolin Hfa Inhaler -  IH   





  Q6H PRN   





  SHORT OF BREATH/WHEEZING   





     





     





     


 


Amiodarone HCl  200 mg  10/05/19 10:00  10/07/19 10:26





  Cordarone -  PO   200 mg





  BID RAEGAN   Administration





     





     





     





     


 


Budesonide/Formoterol Fumarate  1 puff  10/05/19 10:00  10/07/19 10:28





  Symbicort 160/4.5mcg -  IH   1 puff





  DAILY RAEGAN   Administration





     





     





     





     


 


Carvedilol  12.5 mg  10/05/19 10:00  10/07/19 10:26





  Coreg -  PO   12.5 mg





  BID RAEGAN   Administration





     





     





     





     


 


Chlordiazepoxide HCl  10 mg  10/08/19 05:00  





  Librium -  PO  10/08/19 05:01  





  ONCE ONE   





     





     





     





     


 


Chlordiazepoxide HCl  10 mg  10/07/19 06:29  





  Librium -  PO  10/07/19 21:01  





  Q8H RAEGNA   





     





     





     





     


 


Chlordiazepoxide HCl  10 mg  10/07/19 06:30  





  Librium -  PO  10/07/19 23:59  





  Q12H PRN   





  Signs/symptoms of Withdrawal   





     





     





     


 


Folic Acid  1 mg  10/05/19 10:00  10/07/19 10:26





  Folic Acid -  PO   1 mg





  DAILY RAEGAN   Administration





     





     





     





     


 


Heparin Sodium (Porcine)  5,000 unit  10/04/19 22:00  10/07/19 05:54





  Heparin -  SQ   5,000 unit





  TID RAEGAN   Administration





     





     





     





     


 


Lactated Ringer's  1,000 ml in 1,000 mls @ 150 mls/hr  10/06/19 08:30  10/07/19 

10:28





  Lactated Ringers Solution  IV   150 mls/hr





  ASDIR RAEGAN   Administration





     





     





     





     


 


Ipratropium Bromide  1 amp  10/05/19 12:00  10/07/19 08:45





  Atrovent 0.02% Nebulizer -  NEB   1 amp





  RQID RAEGAN   Administration





     





     





     





     


 


Lidocaine  1 patch  10/04/19 17:45  10/07/19 10:25





  Lidoderm Patch -  TP   1 patch





  DAILY RAEGAN   Administration





     





     





     





     


 


Miscellaneous  1 each  10/04/19 22:00  10/06/19 21:43





  Lidoderm Patch Removal  MC   1 each





  DAILY@2200 RAEGAN   Administration





     





     





     





     


 


Morphine Sulfate  2 mg  10/06/19 10:34  10/07/19 08:36





  Morphine Sulfate  IVPUSH   2 mg





  Q4H PRN   Administration





  PAIN LEVEL 6-10   





     





     





     


 


Multivitamins/Minerals/Vitamin C  1 tab  10/05/19 10:00  10/07/19 10:26





  Tab-A-Vit -  PO   1 tab





  DAILY RAEGAN   Administration





     





     





     





     


 


Pantoprazole Sodium  40 mg  10/05/19 10:00  10/07/19 10:26





  Protonix -  PO   40 mg





  DAILY RAEGAN   Administration





     





     





     





     


 


Sacubitril/Valsartan  1 tab  10/05/19 10:00  10/07/19 10:26





  Entresto 24 Mg-26 Mg Tablet  PO   1 tab





  BID RAEGAN   Administration





     





     





     





     


 


Thiamine HCl  100 mg  10/05/19 10:00  10/07/19 10:26





  Vitamin B1 -  PO   100 mg





  DAILY RAEGAN   Administration





     





     





     





     

















Imaging:


- Head/c-spine CT showed oblique oriented fracture at junction of lateral mass 

and body of C2 with mild step-off, cortical disruption. Subtle nondisplaced fx 

similar location contralateral side. Neck supple, not rigid/tender.





ASSESSMENT/PLAN:


Mr. Charles is a 54 year old man with a past medical history of afib (not on 

eliquis), HTN, CHF (EF35%), asthma, and alcohol abuse who presents to the ED s/

p 2 falls in his bedroom while drunk. 





1. Fall 2/2 acute alcohol intoxication with resultant bilateral C2 Fx on CT 

scan 


   - continue C-collar (hard)


   - Neurosurgers (dr. pineda) consulted


   - MRI c-spine still > per MRI the patient was taken down last week but was 

too anxious to tolerate the exam. Per patient, he was withdrawing and that is 

why he could not tolerate. Is willing to try againnow that his withdrawal 

improved. 


   - Valium 5 PO if too anxious for MRI


   - lidocaine patch


   - tylenol prn for pain


   - ibuprofen prn for pain


   - plan to dc pt morphine in AM and start tramadol





2- Acute pancreatitis most likely alcohol induced. No gall stones, and NL TG. 

Nl  Calcium 


   - cont IVF LR @ 150cc


   - f/u CXR to look for vol overload given hx CHF   


   - decrease fluids tomorrow to avoid vol overload


   - resume clears, advance diet as tolerated


   - cont morphine, dc in AM and start toradol since patient is ready to 

advance diet





3. Alcohol withdrawal- no evidence of wernicke's, mild tremor, good 

coordination without cerebellar signs. No asterixis. 


   - Continue librium protocol for withdrawal


   - PO thiamine daily


   - PO folate daily


   - PO multivitamin





4. CHF


   -check Cxray to evaluate for pulm congestion as he has new cough and is on 

high vol fluids for pancreatitis 


   - monitor on IVF 


   - careful rehydration to avoid vol overload, plan to decrease fluids in AM


   - Patient reported he does not have refills on his meds because he is 

uninsured and unable to follow up with his doctors. Will continue his old 

medications and discuss with social work about helping patient to apply for 

insurance


   - plan to have pt follow up at [facility]


   - will discuss with patient in the AM whether he wished to resume/ pay for 

eliquis or if he would prefer coumadin (with the understanding that it will 

require frequent blood draws and close follow up)





5. Afib


   - will hold AC for now pending results of MRI incase patient needs procedure


   - will consider resuming after result of MRI


   - continuing coreg for now








6 HTN: Cont coreg and Entresto





FEN


- Encourage PO fluid intake


- replete lytes PRN


- low fat diet





DVT px :  SQ heparin. can hold for any procedure





Dispo: admit to med-surg. Patient amenable to go to [facility] for alcohol detox 

after medically cleared





Visit type





- Emergency Visit


Emergency Visit: Yes


ED Registration Date: 10/04/19


Care time: The patient presented to the Emergency Department on the above date 

and was hospitalized for further evaluation of their emergent condition.





- New Patient


This patient is new to me today: No





- Critical Care


Critical Care patient: No





- Discharge Referral


Referred to [facility] Yes





ATTENDING PHYSICIAN STATEMENT





I saw and evaluated the patient.


I reviewed the resident's note and discussed the case with the resident.


I agree with the resident's findings and plan as documented.








SUBJECTIVE:








OBJECTIVE:








ASSESSMENT AND PLAN:

## 2019-10-07 NOTE — PN
Teaching Attending Note


Name of Resident: Dari Gibson





ATTENDING PHYSICIAN STATEMENT





I saw and evaluated the patient.


I reviewed the resident's note and discussed the case with the resident.


I agree with the resident's findings and plan as documented.








SUBJECTIVE:


No fever or chills. no HA . improved abd pain with pain meds. has cough 








OBJECTIVE:


NAD, awake, alert


HEENT: MMM. 


CV: RRR, no MRG , No JVD 


Lungs: CATB 


Abd: : obese, soft, mild TTP in LUQ, ND , NL BS. . no rebound  or guarding 

today 


Ext : No edema, no erythema, No tremor 








ASSESSMENT AND PLAN:





55 y/o man with h/o P AFib/A flutter, non ischemic cardiomyopathy, PFO, HTN, 

ETOH abuse, Asthma, who presented with a fall  while intoxicated. he was found 

to have C2 Fx and ETOH Withdrawal 





1-S/p fall with resultant  B/l C2 Fx on CT scan : 


- Cont hard collar. 


- MRI of C spine pending 


- cont pain meds 





2- Acute pancreatitis . likely alcohol induced. No gall stones, and NL TG. Nl  

Calcium 


- cont IVF. will decrease dose tomorrow as he has CHF 


- cont NPO. if pain is better tomorrow , will start clears 


- cont morphine 








3- H/o chronic Systolic heart failure: 


-check Cxray to evaluate for pulm congestion as he has new cough 


- monitor on IVF 


- His pharmacy will be open today to confirm his cardiac meds  





4- H/o AFib/A flutter. now in sinus. He is not compliant with his eliquis 


- will hold eliquis until decision on any procedure for C2 Fx 


- cont coreg 





5- ETOH WD: 


- Cont librium protocol .


- Cont folate, thiamine , and MVT. 





6- HTN: Cont coreg and Entresto, pending confirmation of his meds 





7-DVT px: SQ heparin. can hold for any procedure 








HLOC

## 2019-10-08 LAB
ALBUMIN SERPL-MCNC: 3.3 G/DL (ref 3.4–5)
ALP SERPL-CCNC: 58 U/L (ref 45–117)
ALT SERPL-CCNC: 43 U/L (ref 13–61)
ANION GAP SERPL CALC-SCNC: 9 MMOL/L (ref 8–16)
AST SERPL-CCNC: 39 U/L (ref 15–37)
BILIRUB SERPL-MCNC: 0.6 MG/DL (ref 0.2–1)
BUN SERPL-MCNC: 9 MG/DL (ref 7–18)
CALCIUM SERPL-MCNC: 8.8 MG/DL (ref 8.5–10.1)
CHLORIDE SERPL-SCNC: 102 MMOL/L (ref 98–107)
CO2 SERPL-SCNC: 27 MMOL/L (ref 21–32)
CREAT SERPL-MCNC: 0.8 MG/DL (ref 0.55–1.3)
DEPRECATED RDW RBC AUTO: 14.1 % (ref 11.9–15.9)
GLUCOSE SERPL-MCNC: 108 MG/DL (ref 74–106)
HCT VFR BLD CALC: 36.2 % (ref 35.4–49)
HGB BLD-MCNC: 12.3 GM/DL (ref 11.7–16.9)
MCH RBC QN AUTO: 31.6 PG (ref 25.7–33.7)
MCHC RBC AUTO-ENTMCNC: 33.9 G/DL (ref 32–35.9)
MCV RBC: 93.1 FL (ref 80–96)
PLATELET # BLD AUTO: 168 K/MM3 (ref 134–434)
PMV BLD: 7.2 FL (ref 7.5–11.1)
POTASSIUM SERPLBLD-SCNC: 3.6 MMOL/L (ref 3.5–5.1)
PROT SERPL-MCNC: 6.3 G/DL (ref 6.4–8.2)
RBC # BLD AUTO: 3.88 M/MM3 (ref 4–5.6)
SODIUM SERPL-SCNC: 139 MMOL/L (ref 136–145)
WBC # BLD AUTO: 6.1 K/MM3 (ref 4–10)

## 2019-10-08 RX ADMIN — IPRATROPIUM BROMIDE SCH AMP: 0.5 SOLUTION RESPIRATORY (INHALATION) at 17:00

## 2019-10-08 RX ADMIN — HEPARIN SODIUM SCH UNIT: 5000 INJECTION, SOLUTION INTRAVENOUS; SUBCUTANEOUS at 21:50

## 2019-10-08 RX ADMIN — IPRATROPIUM BROMIDE SCH AMP: 0.5 SOLUTION RESPIRATORY (INHALATION) at 07:50

## 2019-10-08 RX ADMIN — MORPHINE SULFATE PRN MG: 2 INJECTION, SOLUTION INTRAMUSCULAR; INTRAVENOUS at 06:43

## 2019-10-08 RX ADMIN — SACUBITRIL AND VALSARTAN SCH TAB: 24; 26 TABLET, FILM COATED ORAL at 09:27

## 2019-10-08 RX ADMIN — SACUBITRIL AND VALSARTAN SCH TAB: 24; 26 TABLET, FILM COATED ORAL at 21:50

## 2019-10-08 RX ADMIN — BUDESONIDE AND FORMOTEROL FUMARATE DIHYDRATE SCH PUFF: 160; 4.5 AEROSOL RESPIRATORY (INHALATION) at 09:32

## 2019-10-08 RX ADMIN — FOLIC ACID SCH MG: 1 TABLET ORAL at 09:09

## 2019-10-08 RX ADMIN — SODIUM CHLORIDE, POTASSIUM CHLORIDE, SODIUM LACTATE AND CALCIUM CHLORIDE SCH MLS/HR: 600; 310; 30; 20 INJECTION, SOLUTION INTRAVENOUS at 08:45

## 2019-10-08 RX ADMIN — Medication SCH MG: at 09:09

## 2019-10-08 RX ADMIN — MULTIVITAMIN TABLET SCH TAB: TABLET at 09:10

## 2019-10-08 RX ADMIN — LIDOCAINE SCH PATCH: 50 PATCH TOPICAL at 09:11

## 2019-10-08 RX ADMIN — CARVEDILOL SCH MG: 12.5 TABLET, FILM COATED ORAL at 09:10

## 2019-10-08 RX ADMIN — Medication SCH EACH: at 21:52

## 2019-10-08 RX ADMIN — AMIODARONE HYDROCHLORIDE SCH MG: 200 TABLET ORAL at 09:10

## 2019-10-08 RX ADMIN — PANTOPRAZOLE SODIUM SCH MG: 40 TABLET, DELAYED RELEASE ORAL at 09:09

## 2019-10-08 RX ADMIN — IPRATROPIUM BROMIDE SCH AMP: 0.5 SOLUTION RESPIRATORY (INHALATION) at 11:41

## 2019-10-08 RX ADMIN — CARVEDILOL SCH MG: 12.5 TABLET, FILM COATED ORAL at 21:50

## 2019-10-08 RX ADMIN — IPRATROPIUM BROMIDE SCH AMP: 0.5 SOLUTION RESPIRATORY (INHALATION) at 20:52

## 2019-10-08 RX ADMIN — AMIODARONE HYDROCHLORIDE SCH MG: 200 TABLET ORAL at 21:50

## 2019-10-08 RX ADMIN — ACETAMINOPHEN PRN MG: 325 TABLET ORAL at 09:25

## 2019-10-08 RX ADMIN — MORPHINE SULFATE PRN MG: 2 INJECTION, SOLUTION INTRAMUSCULAR; INTRAVENOUS at 17:59

## 2019-10-08 RX ADMIN — HEPARIN SODIUM SCH UNIT: 5000 INJECTION, SOLUTION INTRAVENOUS; SUBCUTANEOUS at 14:01

## 2019-10-08 RX ADMIN — HEPARIN SODIUM SCH UNIT: 5000 INJECTION, SOLUTION INTRAVENOUS; SUBCUTANEOUS at 06:29

## 2019-10-08 NOTE — PN
Progress Note (short form)





- Note


Progress Note: 





Patient in bed wearing collar. Stable.

## 2019-10-08 NOTE — PN
Physical Exam: 


SUBJECTIVE: Patient seen and examined at the bedside, there were no acute 

events overnight. Patient states that he had increased pain with his clear 

diet. Also states that he has worsening back pain.








OBJECTIVE:





 Vital Signs











 Period  Temp  Pulse  Resp  BP Sys/Tobias  Pulse Ox


 


 Last 24 Hr  97.8 F-98.8 F  74-90  16-20  146-157/  96-96











GENERAL: The patient is awake, alert, and fully oriented, in no acute distress.


HEAD: Normal with no signs of trauma.


EYES: PERRL, extraocular movements intact, sclera anicteric, conjunctiva clear. 

No ptosis. horizontal nystagmus present but improving


ENT: Ears normal, nares patent, oropharynx clear without exudates, moist mucous 

membranes.


NECK: C-collar in place


LUNGS: Breath sounds equal, clear to auscultation bilaterally, no wheezes, some 

faint scattered wheezes in the inferior lung fields, no 


accessory muscle use. 


HEART: Regular rate and rhythm, S1, S2 without murmur, rub or gallop, no JVD


ABDOMEN: Soft, pbese, tender to palpation in the LUQ, nondistended, normoactive 

bowel sounds


EXTREMITIES: 2+ pulses, warm, well-perfused, no edema, tremor not perceptible 

but able to be felt finger tip to finger tip.


NEUROLOGICAL: Cranial nerves II through XII grossly intact. Normal speech, gait 

not observed.


PSYCH: Normal mood, normal affect.


SKIN: Warm, dry, normal turgor, no rashes or lesions noted











 Laboratory Results - last 24 hr











  10/08/19 10/08/19





  07:40 07:40


 


WBC  6.1 


 


RBC  3.88 L 


 


Hgb  12.3 


 


Hct  36.2 


 


MCV  93.1 


 


MCH  31.6 


 


MCHC  33.9 


 


RDW  14.1 


 


Plt Count  168 


 


MPV  7.2 L 


 


Sodium   139


 


Potassium   3.6


 


Chloride   102


 


Carbon Dioxide   27


 


Anion Gap   9


 


BUN   9.0


 


Creatinine   0.8


 


Est GFR (CKD-EPI)AfAm   115.74


 


Est GFR (CKD-EPI)NonAf   99.86


 


Random Glucose   108 H


 


Calcium   8.8


 


Total Bilirubin   0.6


 


AST   39 H


 


ALT   43


 


Alkaline Phosphatase   58


 


Total Protein   6.3 L


 


Albumin   3.3 L








Active Medications











Generic Name Dose Route Start Last Admin





  Trade Name Freq  PRN Reason Stop Dose Admin


 


Acetaminophen  650 mg  10/06/19 10:35  10/08/19 09:25





  Tylenol -  PO   650 mg





  Q6H PRN   Administration





  PAIN LEVEL 1-5   





     





     





     


 


Albuterol Sulfate  2 puff  10/05/19 08:04  





  Ventolin Hfa Inhaler -  IH   





  Q6H PRN   





  SHORT OF BREATH/WHEEZING   





     





     





     


 


Amiodarone HCl  200 mg  10/05/19 10:00  10/08/19 09:10





  Cordarone -  PO   200 mg





  BID RAEGAN   Administration





     





     





     





     


 


Budesonide/Formoterol Fumarate  1 puff  10/05/19 10:00  10/08/19 09:32





  Symbicort 160/4.5mcg -  IH   1 puff





  DAILY RAEGAN   Administration





     





     





     





     


 


Carvedilol  12.5 mg  10/05/19 10:00  10/08/19 09:10





  Coreg -  PO   12.5 mg





  BID RAEGAN   Administration





     





     





     





     


 


Folic Acid  1 mg  10/05/19 10:00  10/08/19 09:09





  Folic Acid -  PO   1 mg





  DAILY RAEGAN   Administration





     





     





     





     


 


Heparin Sodium (Porcine)  5,000 unit  10/04/19 22:00  10/08/19 14:01





  Heparin -  SQ   5,000 unit





  TID RAEGAN   Administration





     





     





     





     


 


Lactated Ringer's  1,000 ml in 1,000 mls @ 150 mls/hr  10/06/19 08:30  10/08/19 

08:45





  Lactated Ringers Solution  IV   150 mls/hr





  ASDIR RAEGAN   Administration





     





     





     





     


 


Ipratropium Bromide  1 amp  10/05/19 12:00  10/08/19 11:41





  Atrovent 0.02% Nebulizer -  NEB   1 amp





  RQID RAEGAN   Administration





     





     





     





     


 


Lidocaine  1 patch  10/04/19 17:45  10/08/19 09:11





  Lidoderm Patch -  TP   1 patch





  DAILY RAEGAN   Administration





     





     





     





     


 


Miscellaneous  1 each  10/04/19 22:00  10/07/19 22:36





  Lidoderm Patch Removal  MC   1 each





  DAILY@2200 RAEGAN   Administration





     





     





     





     


 


Multivitamins/Minerals/Vitamin C  1 tab  10/05/19 10:00  10/08/19 09:10





  Tab-A-Vit -  PO   1 tab





  DAILY RAEGAN   Administration





     





     





     





     


 


Pantoprazole Sodium  40 mg  10/05/19 10:00  10/08/19 09:09





  Protonix -  PO   40 mg





  DAILY RAEGAN   Administration





     





     





     





     


 


Sacubitril/Valsartan  1 tab  10/05/19 10:00  10/08/19 09:27





  Entresto 24 Mg-26 Mg Tablet  PO   1 tab





  BID RAEGAN   Administration





     





     





     





     


 


Thiamine HCl  100 mg  10/05/19 10:00  10/08/19 09:09





  Vitamin B1 -  PO   100 mg





  DAILY RAEGAN   Administration





     





     





     





     


 


Tramadol HCl  50 mg  10/08/19 10:00  10/08/19 09:24





  Ultram -  PO   50 mg





  Q8H PRN   Administration





  PAIN LEVEL 7 - 10   





     





     





     











Imaging:


- Head/c-spine CT showed oblique oriented fracture at junction of lateral mass 

and body of C2 with mild step-off, cortical disruption. Subtle nondisplaced fx 

similar location contralateral side. Neck supple, not rigid/tender.





ASSESSMENT/PLAN:


Mr. Charles is a 54 year old man with a past medical history of afib (not on 

eliquis), HTN, CHF (EF35%), asthma, and alcohol abuse who presents to the ED s/

p 2 falls in his bedroom while drunk. 





1. Fall 2/2 acute alcohol intoxication with resultant bilateral C2 Fx on CT 

scan 


   - continue C-collar (hard)


   - Neurosurgers (dr. pineda) consulted, no surgical intervention at this time


   - MRI c-spine still > MRI showing chronic fx at C2 without edema


   - lidocaine patch


   - tylenol prn for pain


   - ibuprofen prn for pain


   - cont morphine 








2- Acute pancreatitis most likely alcohol induced. No gall stones, and NL TG. 

Nl  Calcium 


   - cont IVF LR @ 150cc- patient did not appear vol overloaded on physical 

exam this morning. Lungs sounded clear and chest x ray yesterday without 

evidence of vol overload. Will continue to monitor closely and will decrease 

rate of fluids should patient begin to show signs of vol overload. 


   - NPO, patient was tried on clears yesterday but reports worsening abdominal 

pain, will DC diet and obtain CT abdomen/pelvis to r/o necrotizing pancreatitis 

or pseudocyst


   - f/u lipase tomorrow


   - cont morphine for now, will consider transitioning to toradol once patient'

s pancreatitis is improving





3- Back pain:


   - patient complaining of new/ worsening back pain, will obtain lumbar/ 

thoracic ct to r/o any nerve impingement or compression





4. Alcohol withdrawal- no evidence of wernicke's, mild tremor, good 

coordination without cerebellar signs. No asterixis. 


   - resolved


   - PO thiamine daily


   - PO folate daily


   - PO multivitamin





5. CHF


   -check Cxray to evaluate for pulm congestion as he has new cough and is on 

high vol fluids for pancreatitis 


   - monitor on IVF 


   - careful rehydration to avoid vol overload


   - Patient reported he does not have refills on his meds because he is 

uninsured and unable to follow up with his doctors. Will continue his old 

medications and discuss with social work about helping patient to apply for 

insurance


   - plan to have pt follow up at [facility]


   - will discuss with patient in the AM whether he wished to resume/ pay for 

eliquis or if he would prefer coumadin (with the understanding that it will 

require frequent blood draws and close follow up)





6. Afib


   - will hold AC for now pending results of lumbar thoracic CT


   - will consider resuming after result of above CT 


   - continuing coreg for now








7. HTN: Cont coreg and Entresto





FEN


- Encourage PO fluid intake


- replete lytes PRN


- low fat diet





DVT px :  SQ heparin. can hold for any procedure





Dispo: admit to med-surg. Patient amenable to go to [facility] for alcohol detox 

after medically cleared











Visit type





- Emergency Visit


Emergency Visit: Yes


ED Registration Date: 10/04/19


Care time: The patient presented to the Emergency Department on the above date 

and was hospitalized for further evaluation of their emergent condition.





- New Patient


This patient is new to me today: No





- Critical Care


Critical Care patient: No





- Discharge Referral


Referred to [facility] No





ATTENDING PHYSICIAN STATEMENT





I saw and evaluated the patient.


I reviewed the resident's note and discussed the case with the resident.


I agree with the resident's findings and plan as documented.








SUBJECTIVE:








OBJECTIVE:








ASSESSMENT AND PLAN:

## 2019-10-08 NOTE — PN
Teaching Attending Note


Name of Resident: Dari Gibson





ATTENDING PHYSICIAN STATEMENT





I saw and evaluated the patient.


I reviewed the resident's note and discussed the case with the resident.


I agree with the resident's findings and plan as documented.








SUBJECTIVE:


Patient is c/o having neck pain. 


OBJECTIVE:














Temperature  97.8 F   10/08/19 09:00


 


Pulse Rate  74   10/08/19 09:00


 


Respiratory Rate  16   10/08/19 09:00


 


Blood Pressure  152/102 H  10/08/19 09:00


 


O2 Sat by Pulse Oximetry (%)  96   10/08/19 09:00








GENERAL: The patient is awake, alert, and fully oriented, in no acute distress.


HEAD: Normal with no signs of trauma. in a hard neck Collar. 


EYES: PERRL, extraocular movements intact, sclera anicteric, conjunctiva clear. 


ENT: Ears normal,  oropharynx clear without exudates, moist mucous membranes.


NECK: Trachea midline, full range of motion, supple. 


LUNGS: Breath sounds equal, clear to auscultation bilaterally, no wheezes, no 

crackles, no accessory muscle use. 


HEART: Regular rate and rhythm, S1, S2 without murmur, rub or gallop.


ABDOMEN: Soft, nontender, nondistended, normoactive bowel sounds, no guarding, 

no rebound, no hepatosplenomegaly, no masses.


EXTREMITIES: 2+ pulses, warm, well-perfused, no edema. 


NEUROLOGICAL: Cranial nerves II through XII grossly intact. Normal speech, gait 

not observed.


PSYCH: Normal mood, normal affect.


SKIN: Warm, dry, normal turgor, no rashes or lesions noted


 CBCD











WBC  6.1 K/mm3 (4.0-10.0)   10/08/19  07:40    


 


RBC  3.88 M/mm3 (4.00-5.60)  L  10/08/19  07:40    


 


Hgb  12.3 GM/dL (11.7-16.9)   10/08/19  07:40    


 


Hct  36.2 % (35.4-49)   10/08/19  07:40    


 


MCV  93.1 fl (80-96)   10/08/19  07:40    


 


MCHC  33.9 g/dl (32.0-35.9)   10/08/19  07:40    


 


RDW  14.1 % (11.9-15.9)   10/08/19  07:40    


 


Plt Count  168 K/MM3 (134-434)   10/08/19  07:40    


 


MPV  7.2 fl (7.5-11.1)  L  10/08/19  07:40    








 CMP











Sodium  139 mmol/L (136-145)   10/08/19  07:40    


 


Potassium  3.6 mmol/L (3.5-5.1)   10/08/19  07:40    


 


Chloride  102 mmol/L ()   10/08/19  07:40    


 


Carbon Dioxide  27 mmol/L (21-32)   10/08/19  07:40    


 


Anion Gap  9 MMOL/L (8-16)   10/08/19  07:40    


 


BUN  9.0 mg/dL (7-18)   10/08/19  07:40    


 


Creatinine  0.8 mg/dL (0.55-1.3)   10/08/19  07:40    


 


Random Glucose  108 mg/dL ()  H  10/08/19  07:40    


 


Calcium  8.8 mg/dL (8.5-10.1)   10/08/19  07:40    


 


Total Bilirubin  0.6 mg/dL (0.2-1)   10/08/19  07:40    


 


AST  39 U/L (15-37)  H  10/08/19  07:40    


 


ALT  43 U/L (13-61)   10/08/19  07:40    


 


Alkaline Phosphatase  58 U/L ()   10/08/19  07:40    


 


Total Protein  6.3 g/dl (6.4-8.2)  L  10/08/19  07:40    


 


Albumin  3.3 g/dl (3.4-5.0)  L  10/08/19  07:40    








 CARDIAC ENZYMES











Creatine Kinase  134 U/L ()   10/04/19  12:30    


 


Troponin I  < 0.02 ng/ml (0.00-0.05)   10/04/19  12:30    








 Current Medications











Generic Name Dose Route Start Last Admin





  Trade Name Freq  PRN Reason Stop Dose Admin


 


Acetaminophen  650 mg  10/06/19 10:35  10/08/19 09:25





  Tylenol -  PO   650 mg





  Q6H PRN   Administration





  PAIN LEVEL 1-5   





     





     





     


 


Albuterol Sulfate  2 puff  10/05/19 08:04  





  Ventolin Hfa Inhaler -  IH   





  Q6H PRN   





  SHORT OF BREATH/WHEEZING   





     





     





     


 


Amiodarone HCl  200 mg  10/05/19 10:00  10/08/19 09:10





  Cordarone -  PO   200 mg





  BID RAEGAN   Administration





     





     





     





     


 


Budesonide/Formoterol Fumarate  1 puff  10/05/19 10:00  10/08/19 09:32





  Symbicort 160/4.5mcg -  IH   1 puff





  DAILY RAEGAN   Administration





     





     





     





     


 


Carvedilol  12.5 mg  10/05/19 10:00  10/08/19 09:10





  Coreg -  PO   12.5 mg





  BID RAEGAN   Administration





     





     





     





     


 


Folic Acid  1 mg  10/05/19 10:00  10/08/19 09:09





  Folic Acid -  PO   1 mg





  DAILY RAEGAN   Administration





     





     





     





     


 


Heparin Sodium (Porcine)  5,000 unit  10/04/19 22:00  10/08/19 14:01





  Heparin -  SQ   5,000 unit





  TID RAEGAN   Administration





     





     





     





     


 


Lactated Ringer's  1,000 ml in 1,000 mls @ 100 mls/hr  10/08/19 16:17  





  Lactated Ringers Solution  IV   





  ASDIR RAEGAN   





     





     





     





     


 


Ipratropium Bromide  1 amp  10/05/19 12:00  10/08/19 20:52





  Atrovent 0.02% Nebulizer -  NEB   1 amp





  RQID RAEGAN   Administration





     





     





     





     


 


Lidocaine  1 patch  10/04/19 17:45  10/08/19 09:11





  Lidoderm Patch -  TP   1 patch





  DAILY RAEGAN   Administration





     





     





     





     


 


Miscellaneous  1 each  10/04/19 22:00  10/07/19 22:36





  Lidoderm Patch Removal  MC   1 each





  DAILY@2200 RAEGAN   Administration





     





     





     





     


 


Morphine Sulfate  2 mg  10/08/19 16:18  10/08/19 17:59





  Morphine Sulfate  IVPUSH   2 mg





  Q6H PRN   Administration





  PAIN LEVEL 7 - 10   





     





     





     


 


Multivitamins/Minerals/Vitamin C  1 tab  10/05/19 10:00  10/08/19 09:10





  Tab-A-Vit -  PO   1 tab





  DAILY RAEGAN   Administration





     





     





     





     


 


Pantoprazole Sodium  40 mg  10/05/19 10:00  10/08/19 09:09





  Protonix -  PO   40 mg





  DAILY RAEGAN   Administration





     





     





     





     


 


Sacubitril/Valsartan  1 tab  10/05/19 10:00  10/08/19 09:27





  Entresto 24 Mg-26 Mg Tablet  PO   1 tab





  BID RAEGAN   Administration





     





     





     





     


 


Thiamine HCl  100 mg  10/05/19 10:00  10/08/19 09:09





  Vitamin B1 -  PO   100 mg





  DAILY RAEGAN   Administration





     





     





     





     








 Home Medications











 Medication  Instructions  Recorded


 


Clonazepam [Klonopin] 0.5 mg PO DAILY 10/05/19


 


Escitalopram Oxalate [Lexapro -] 20 mg PO DAILY 10/05/19


 


Gabapentin [Neurontin] 300 mg PO DAILY 10/05/19





Assessment and plan: 


Patient is a 53 y/o man with h/o P AFib/A flutter, non ischemic cardiomyopathy, 

PFO, HTN, ETOH abuse, Asthma, who presented with a fall  while intoxicated. he 

was found to have C2 Fx and ETOH Withdrawal 





#S/p fall with  B/l C2 Fx on CT scan :  Cont hard collar. MRI of spine , neuro 

sx on the case


# Acute pancreatitis . likely alcohol induced. No gall stones, and NL TG. Nl  

Calcium , continue Ivf and morphine, npo will monitor 


# H/o chronic Systolic heart failure:stable cont coreg 


# H/o AFib/A flutter. now in sinus. He is not compliant with his eliquis 


- will hold eliquis until decision on any procedure for C2 Fx 


# ETOH WD:  Cont librium protocol . Cont folate, thiamine , and MVT. 


# HTN: Cont coreg and Entresto, pending confirmation of his meds 





DVT px: SQ heparin.

## 2019-10-09 LAB
ALBUMIN SERPL-MCNC: 3.7 G/DL (ref 3.4–5)
ALP SERPL-CCNC: 67 U/L (ref 45–117)
ALT SERPL-CCNC: 69 U/L (ref 13–61)
ANION GAP SERPL CALC-SCNC: 9 MMOL/L (ref 8–16)
AST SERPL-CCNC: 58 U/L (ref 15–37)
BASOPHILS # BLD: 0.7 % (ref 0–2)
BILIRUB SERPL-MCNC: 0.7 MG/DL (ref 0.2–1)
BUN SERPL-MCNC: 9.2 MG/DL (ref 7–18)
CALCIUM SERPL-MCNC: 9.2 MG/DL (ref 8.5–10.1)
CHLORIDE SERPL-SCNC: 102 MMOL/L (ref 98–107)
CO2 SERPL-SCNC: 28 MMOL/L (ref 21–32)
CREAT SERPL-MCNC: 0.9 MG/DL (ref 0.55–1.3)
DEPRECATED RDW RBC AUTO: 14.8 % (ref 11.9–15.9)
EOSINOPHIL # BLD: 7.6 % (ref 0–4.5)
GLUCOSE SERPL-MCNC: 92 MG/DL (ref 74–106)
HCT VFR BLD CALC: 40.7 % (ref 35.4–49)
HGB BLD-MCNC: 13.6 GM/DL (ref 11.7–16.9)
LYMPHOCYTES # BLD: 24.5 % (ref 8–40)
MCH RBC QN AUTO: 31.5 PG (ref 25.7–33.7)
MCHC RBC AUTO-ENTMCNC: 33.3 G/DL (ref 32–35.9)
MCV RBC: 94.5 FL (ref 80–96)
MONOCYTES # BLD AUTO: 12.3 % (ref 3.8–10.2)
NEUTROPHILS # BLD: 54.9 % (ref 42.8–82.8)
PLATELET # BLD AUTO: 193 K/MM3 (ref 134–434)
PMV BLD: 7.4 FL (ref 7.5–11.1)
POTASSIUM SERPLBLD-SCNC: 3.4 MMOL/L (ref 3.5–5.1)
PROT SERPL-MCNC: 7.1 G/DL (ref 6.4–8.2)
RBC # BLD AUTO: 4.31 M/MM3 (ref 4–5.6)
SODIUM SERPL-SCNC: 138 MMOL/L (ref 136–145)
WBC # BLD AUTO: 7.2 K/MM3 (ref 4–10)

## 2019-10-09 RX ADMIN — IPRATROPIUM BROMIDE SCH AMP: 0.5 SOLUTION RESPIRATORY (INHALATION) at 16:05

## 2019-10-09 RX ADMIN — CARVEDILOL SCH MG: 12.5 TABLET, FILM COATED ORAL at 21:42

## 2019-10-09 RX ADMIN — FOLIC ACID SCH MG: 1 TABLET ORAL at 09:31

## 2019-10-09 RX ADMIN — AMIODARONE HYDROCHLORIDE SCH MG: 200 TABLET ORAL at 21:42

## 2019-10-09 RX ADMIN — Medication SCH MG: at 09:31

## 2019-10-09 RX ADMIN — SACUBITRIL AND VALSARTAN SCH TAB: 24; 26 TABLET, FILM COATED ORAL at 09:31

## 2019-10-09 RX ADMIN — MORPHINE SULFATE PRN MG: 2 INJECTION, SOLUTION INTRAMUSCULAR; INTRAVENOUS at 04:34

## 2019-10-09 RX ADMIN — IPRATROPIUM BROMIDE SCH AMP: 0.5 SOLUTION RESPIRATORY (INHALATION) at 07:35

## 2019-10-09 RX ADMIN — PANTOPRAZOLE SODIUM SCH MG: 40 TABLET, DELAYED RELEASE ORAL at 09:31

## 2019-10-09 RX ADMIN — SODIUM CHLORIDE, POTASSIUM CHLORIDE, SODIUM LACTATE AND CALCIUM CHLORIDE SCH MLS/HR: 600; 310; 30; 20 INJECTION, SOLUTION INTRAVENOUS at 04:26

## 2019-10-09 RX ADMIN — HEPARIN SODIUM SCH UNIT: 5000 INJECTION, SOLUTION INTRAVENOUS; SUBCUTANEOUS at 21:41

## 2019-10-09 RX ADMIN — BUDESONIDE AND FORMOTEROL FUMARATE DIHYDRATE SCH PUFF: 160; 4.5 AEROSOL RESPIRATORY (INHALATION) at 09:32

## 2019-10-09 RX ADMIN — HEPARIN SODIUM SCH UNIT: 5000 INJECTION, SOLUTION INTRAVENOUS; SUBCUTANEOUS at 14:15

## 2019-10-09 RX ADMIN — IPRATROPIUM BROMIDE SCH AMP: 0.5 SOLUTION RESPIRATORY (INHALATION) at 21:27

## 2019-10-09 RX ADMIN — ACETAMINOPHEN PRN MG: 325 TABLET ORAL at 06:38

## 2019-10-09 RX ADMIN — SACUBITRIL AND VALSARTAN SCH TAB: 24; 26 TABLET, FILM COATED ORAL at 21:42

## 2019-10-09 RX ADMIN — IPRATROPIUM BROMIDE SCH AMP: 0.5 SOLUTION RESPIRATORY (INHALATION) at 11:21

## 2019-10-09 RX ADMIN — Medication SCH EACH: at 21:43

## 2019-10-09 RX ADMIN — MORPHINE SULFATE PRN MG: 2 INJECTION, SOLUTION INTRAMUSCULAR; INTRAVENOUS at 11:26

## 2019-10-09 RX ADMIN — MULTIVITAMIN TABLET SCH TAB: TABLET at 09:31

## 2019-10-09 RX ADMIN — LIDOCAINE SCH PATCH: 50 PATCH TOPICAL at 09:31

## 2019-10-09 RX ADMIN — AMIODARONE HYDROCHLORIDE SCH MG: 200 TABLET ORAL at 09:32

## 2019-10-09 RX ADMIN — CARVEDILOL SCH MG: 12.5 TABLET, FILM COATED ORAL at 09:31

## 2019-10-09 RX ADMIN — MORPHINE SULFATE PRN MG: 2 INJECTION, SOLUTION INTRAMUSCULAR; INTRAVENOUS at 21:50

## 2019-10-09 RX ADMIN — HEPARIN SODIUM SCH UNIT: 5000 INJECTION, SOLUTION INTRAVENOUS; SUBCUTANEOUS at 06:32

## 2019-10-09 NOTE — PN
Physical Exam: 


SUBJECTIVE: Patient seen and examined








OBJECTIVE:





 Vital Signs











 Period  Temp  Pulse  Resp  BP Sys/Tobias  Pulse Ox


 


 Last 24 Hr  97.9 F-98.5 F  72-77  18-20  135-157/  











GENERAL: The patient is awake, alert, and fully oriented, in no acute distress.


HEAD: Normal with no signs of trauma.


EYES: PERRL, extraocular movements intact, sclera anicteric, conjunctiva clear. 

No ptosis. 


ENT: Ears normal, nares patent, oropharynx clear without exudates, moist mucous 


membranes.


NECK: Trachea midline, full range of motion, supple. 


LUNGS: Breath sounds equal, clear to auscultation bilaterally, no wheezes, no 

crackles, no 


accessory muscle use. 


HEART: Regular rate and rhythm, S1, S2 without murmur, rub or gallop.


ABDOMEN: Soft, nontender, nondistended, normoactive bowel sounds, no guarding, 

no 


rebound, no hepatosplenomegaly, no masses.


EXTREMITIES: 2+ pulses, warm, well-perfused, no edema. 


NEUROLOGICAL: Cranial nerves II through XII grossly intact. Normal speech, gait 

not 


observed.


PSYCH: Normal mood, normal affect.


SKIN: Warm, dry, normal turgor, no rashes or lesions noted














 Laboratory Results - last 24 hr











  10/09/19 10/09/19





  08:35 08:35


 


WBC  7.2 


 


RBC  4.31 


 


Hgb  13.6 


 


Hct  40.7 


 


MCV  94.5 


 


MCH  31.5 


 


MCHC  33.3 


 


RDW  14.8 


 


Plt Count  193 


 


MPV  7.4 L 


 


Absolute Neuts (auto)  3.9 


 


Neutrophils %  54.9 


 


Lymphocytes %  24.5 


 


Monocytes %  12.3 H 


 


Eosinophils %  7.6 H 


 


Basophils %  0.7 


 


Nucleated RBC %  0 


 


Sodium   138


 


Potassium   3.4 L


 


Chloride   102


 


Carbon Dioxide   28


 


Anion Gap   9


 


BUN   9.2


 


Creatinine   0.9


 


Est GFR (CKD-EPI)AfAm   110.27


 


Est GFR (CKD-EPI)NonAf   95.14


 


Random Glucose   92


 


Calcium   9.2


 


Total Bilirubin   0.7


 


AST   58 H


 


ALT   69 H


 


Alkaline Phosphatase   67


 


Total Protein   7.1


 


Albumin   3.7


 


Lipase   3794 H








Active Medications











Generic Name Dose Route Start Last Admin





  Trade Name Freq  PRN Reason Stop Dose Admin


 


Acetaminophen  650 mg  10/06/19 10:35  10/09/19 06:38





  Tylenol -  PO   650 mg





  Q6H PRN   Administration





  PAIN LEVEL 1-5   





     





     





     


 


Albuterol Sulfate  2 puff  10/05/19 08:04  





  Ventolin Hfa Inhaler -  IH   





  Q6H PRN   





  SHORT OF BREATH/WHEEZING   





     





     





     


 


Amiodarone HCl  200 mg  10/05/19 10:00  10/09/19 09:32





  Cordarone -  PO   200 mg





  BID RAEGAN   Administration





     





     





     





     


 


Budesonide/Formoterol Fumarate  1 puff  10/05/19 10:00  10/09/19 09:32





  Symbicort 160/4.5mcg -  IH   1 puff





  DAILY RAEGAN   Administration





     





     





     





     


 


Carvedilol  12.5 mg  10/05/19 10:00  10/09/19 09:31





  Coreg -  PO   12.5 mg





  BID RAEGAN   Administration





     





     





     





     


 


Folic Acid  1 mg  10/05/19 10:00  10/09/19 09:31





  Folic Acid -  PO   1 mg





  DAILY RAEGAN   Administration





     





     





     





     


 


Heparin Sodium (Porcine)  5,000 unit  10/04/19 22:00  10/09/19 14:15





  Heparin -  SQ   5,000 unit





  TID RAEGAN   Administration





     





     





     





     


 


Lactated Ringer's  1,000 ml in 1,000 mls @ 100 mls/hr  10/08/19 16:17  10/09/19 

04:26





  Lactated Ringers Solution  IV   100 mls/hr





  ASDIR RAEGAN   Administration





     





     





     





     


 


Ipratropium Bromide  1 amp  10/05/19 12:00  10/09/19 16:05





  Atrovent 0.02% Nebulizer -  NEB   1 amp





  RQID RAEGAN   Administration





     





     





     





     


 


Lidocaine  1 patch  10/04/19 17:45  10/09/19 09:31





  Lidoderm Patch -  TP   1 patch





  DAILY RAEGAN   Administration





     





     





     





     


 


Miscellaneous  1 each  10/04/19 22:00  10/08/19 21:52





  Lidoderm Patch Removal  MC   1 each





  DAILY@2200 RAEGAN   Administration





     





     





     





     


 


Morphine Sulfate  2 mg  10/08/19 16:18  10/09/19 11:26





  Morphine Sulfate  IVPUSH   2 mg





  Q6H PRN   Administration





  PAIN LEVEL 7 - 10   





     





     





     


 


Multivitamins/Minerals/Vitamin C  1 tab  10/05/19 10:00  10/09/19 09:31





  Tab-A-Vit -  PO   1 tab





  DAILY RAEGAN   Administration





     





     





     





     


 


Pantoprazole Sodium  40 mg  10/05/19 10:00  10/09/19 09:31





  Protonix -  PO   40 mg





  DAILY RAEGAN   Administration





     





     





     





     


 


Sacubitril/Valsartan  1 tab  10/05/19 10:00  10/09/19 09:31





  Entresto 24 Mg-26 Mg Tablet  PO   1 tab





  BID RAEGAN   Administration





     





     





     





     


 


Thiamine HCl  100 mg  10/05/19 10:00  10/09/19 09:31





  Vitamin B1 -  PO   100 mg





  DAILY RAEGAN   Administration





     





     





     





     




















Imaging:


- Head/c-spine CT showed oblique oriented fracture at junction of lateral mass 

and body of C2 with mild step-off, cortical disruption. Subtle nondisplaced fx 

similar location contralateral side. Neck supple, not rigid/tender.





ASSESSMENT/PLAN:


Mr. Charles is a 54 year old man with a past medical history of afib (not on 

eliquis), HTN, CHF (EF35%), asthma, and alcohol abuse who presents to the ED s/

p 2 falls in his bedroom while drunk. 





1. Fall 2/2 acute alcohol intoxication with resultant bilateral C2 Fx on CT 

scan 


   - continue C-collar (hard)


   - Neurosurgers (dr. pineda) consulted, no surgical intervention at this time


   - MRI c-spine still > MRI showing chronic fx at C2 without edema


   - lidocaine patch


   - tylenol prn for pain


   - ibuprofen prn for pain


   - cont morphine 





2- Acute pancreatitis most likely alcohol induced. No gall stones, and NL TG. 

Nl  Calcium 


   - cont IVF LR @ 100cc- patient did not appear vol overloaded on physical 

exam this morning. Lungs sounded clear and chest x ray yesterday without 

evidence of vol overload. Will continue to monitor closely and will decrease 

rate of fluids should patient begin to show signs of vol overload. 


   - CT scan showing some peripancreatic fat stranding, consistent with 

pancreatitis, no evidence of necrosis 


   - advance diet to clear liquid diet


   - cont morphine for now, will consider transitioning to toradol once patient'

s pancreatitis is improving


   - GI following, appreciate recommendations; Mild pancreatitis by [facility] and 

other criteria.  Would continue with pain management and advance diet as 

tolerated.  OK to advance diet to clear liquids at this time. If unable to 

tolerate dietary advance, would consider parenteral nutrition. 





3- Back pain:


   - patient complaining of new/ worsening back pain, will obtain lumbar/ 

thoracic ct to r/o any nerve impingement or compression





4. Alcohol withdrawal- no evidence of wernicke's, mild tremor, good 

coordination without cerebellar signs. No asterixis. 


   - resolved


   - PO thiamine daily


   - PO folate daily


   - PO multivitamin





5. CHF


   -check Cxray to evaluate for pulm congestion as he has new cough and is on 

high vol fluids for pancreatitis 


   - monitor on IVF 


   - careful rehydration to avoid vol overload


   - Patient reported he does not have refills on his meds because he is 

uninsured and unable to follow up with his doctors. Will continue his old 

medications and discuss with social work about helping patient to apply for 

insurance


   - plan to have pt follow up at [facility]


   - will discuss with patient in the AM whether he wished to resume/ pay for 

eliquis or if he would prefer coumadin (with the understanding that it will 

require frequent blood draws and close follow up)





6. Afib


   - will hold AC for now pending results of lumbar thoracic CT


   - will consider resuming after result of above CT 


   - continuing coreg for now








7. HTN: Cont coreg and Entresto





FEN


- LR@100cc/hr


- replete lytes PRN


- low fat diet





DVT px :  SQ heparin. can hold for any procedure





Dispo: advancing diet as tolerated











Visit type





- Emergency Visit


Emergency Visit: Yes


ED Registration Date: 10/04/19


Care time: The patient presented to the Emergency Department on the above date 

and was hospitalized for further evaluation of their emergent condition.





- New Patient


This patient is new to me today: No





- Critical Care


Critical Care patient: No





- Discharge Referral


Referred to [facility] No





ATTENDING PHYSICIAN STATEMENT





I saw and evaluated the patient.


I reviewed the resident's note and discussed the case with the resident.


I agree with the resident's findings and plan as documented.








SUBJECTIVE:








OBJECTIVE:








ASSESSMENT AND PLAN:

## 2019-10-09 NOTE — CON.GI
Consult


Consult Specialty:: GI


Reason for Consultation:: Management of acute pancreatitis





- History of Present Illness


Chief Complaint: Abdominal pain


History of Present Illness: 





Patient reports abdominal pain of few month's duration.  2 types of pain.  One 

in LLQ and one epigastric in location.  Neither with radiation or association 

with nausea, vomiting, change in bowel habit.  Over last week, both have been 

exacerbated by oral intake, but neither when any specific alleviating features.

  Noted to have elevated lipase and amylase.  Ultrasound without gallstones or 

any GB changes (no pericholecystic fluid, sludge, wall thickening).  CT scan 

yesterday with mild changes noted to pancreatic tail; no pseudocyst or evidence 

of necrosis.  EtOH has become a problem for him, leading in part to fall that 

result in C-spine fracture.  With no stones, only trivially elevated 

triglycerides, suspect that pancreatitis alcohol in etiology.   With BUN at or 

near normal range, likely mild pancreatitis, though perhaps persistent.





- Past Medical History


Cardio/Vascular: Yes: AFIB, CHF, HTN


Pulmonary: Yes: Asthma, Bronchitis





- Past Surgical History


Past Surgical History: Yes: Laminectomy (cervical)





- Alcohol/Substance Use


Hx Alcohol Use: Yes





- Smoking History


Smoking history: Former smoker


Have you smoked in the past 12 months: Yes


Aproximately how many cigarettes per day: 3





- Social History


ADL: Independent


Occupation:  [employer]


History of Recent Travel: No





Home Medications





- Allergies


Allergies/Adverse Reactions: 


 Allergies











Allergy/AdvReac Type Severity Reaction Status Date / Time


 


No Known Allergies Allergy   Verified 10/13/14 10:16














- Home Medications


Home Medications: 


Ambulatory Orders





Clonazepam [Klonopin] 0.5 mg PO DAILY 10/05/19 


Escitalopram Oxalate [Lexapro -] 20 mg PO DAILY 10/05/19 


Gabapentin [Neurontin] 300 mg PO DAILY 10/05/19 











Review of Systems





- Review of Systems


Gastrointestinal: reports: Abdominal Pain.  denies: Constipation, Diarrhea, 

Dysphagia, Vomiting





Physical Exam-GI


Vital Signs: 


 Vital Signs











Temperature  98.5 F   10/09/19 02:00


 


Pulse Rate  75   10/09/19 02:00


 


Respiratory Rate  18   10/09/19 02:00


 


Blood Pressure  138/98   10/09/19 02:00


 


O2 Sat by Pulse Oximetry (%)  96   10/08/19 09:00











Constitutional: Yes: Well Nourished, No Distress, Calm


...Auscultate: Yes: Normoactive Bowel Sounds


...Palpate: Yes: Soft.  No: Hepatomegaly, Mass, Splenomegaly, Tenderness


Labs: 


 CBC, BMP





 10/09/19 08:35 





 10/09/19 08:35 





 INR, PTT











INR  0.97  (0.83-1.09)   10/05/19  09:00    














Imaging





- Results


Cat Scan: Report Reviewed


Ultrasound: Report Reviewed





Problem List





- Problems


(1) Pancreatitis, alcoholic, acute


Assessment/Plan: 


Mild pancreatitis by [facility] and other criteria.  Would continue with pain 

management and advance diet as tolerated.  OK to advance diet to clear liquids 

at this time.  Serial lipase not of known value in management, but following 

symptoms, as you are, is.  If unable to tolerate dietary advance, would 

consider parenteral nutrition.  Alcohol avoidance reviewed with patient.


Code(s): K85.20 - ALCOHOL INDUCED ACUTE PANCREATITIS WITHOUT NECROSIS OR INFCT 

  


Qualifiers: 


   Acute pancreatitis complication: no infection or necrosis   Qualified Code(s)

: K85.20 - Alcohol induced acute pancreatitis without necrosis or infection

## 2019-10-09 NOTE — PN
Teaching Attending Note


Name of Resident: Dari Gibson





ATTENDING PHYSICIAN STATEMENT





I saw and evaluated the patient.


I reviewed the resident's note and discussed the case with the resident.


I agree with the resident's findings and plan as documented.








SUBJECTIVE:


Patient continues to have mild abdominal pain but feels hungry. 


OBJECTIVE:


 Vital Signs











Temperature  98.5 F   10/09/19 02:00


 


Pulse Rate  76   10/09/19 15:00


 


Respiratory Rate  20   10/09/19 15:00


 


Blood Pressure  145/90   10/09/19 15:00


 


O2 Sat by Pulse Oximetry (%)  96   10/08/19 09:00








GENERAL: The patient is awake, alert, and fully oriented, in no acute distress.


HEAD: Normal with no signs of trauma. in a hard neck Collar. 


EYES: PERRL, extraocular movements intact, sclera anicteric, conjunctiva clear. 


ENT: Ears normal,  oropharynx clear without exudates, moist mucous membranes.


NECK: Trachea midline, full range of motion, supple. 


LUNGS: Breath sounds equal, clear to auscultation bilaterally, no wheezes, no 

crackles, no accessory muscle use. 


HEART: Regular rate and rhythm, S1, S2 without murmur, rub or gallop.


ABDOMEN: Soft, mild midepigastric tenderness, ND, normoactive bowel sounds, no 

guarding, no rebound, no hepatosplenomegaly, no masses.


EXTREMITIES: 2+ pulses, warm, well-perfused, no edema. 


NEUROLOGICAL: Cranial nerves II through XII grossly intact. Normal speech, gait 

not observed.


PSYCH: Normal mood, normal affect.


SKIN: Warm, dry, normal turgor, no rashes or lesions noted


 CBCD











WBC  7.2 K/mm3 (4.0-10.0)   10/09/19  08:35    


 


RBC  4.31 M/mm3 (4.00-5.60)   10/09/19  08:35    


 


Hgb  13.6 GM/dL (11.7-16.9)   10/09/19  08:35    


 


Hct  40.7 % (35.4-49)   10/09/19  08:35    


 


MCV  94.5 fl (80-96)   10/09/19  08:35    


 


MCHC  33.3 g/dl (32.0-35.9)   10/09/19  08:35    


 


RDW  14.8 % (11.9-15.9)   10/09/19  08:35    


 


Plt Count  193 K/MM3 (134-434)   10/09/19  08:35    


 


MPV  7.4 fl (7.5-11.1)  L  10/09/19  08:35    








 CMP











Sodium  138 mmol/L (136-145)   10/09/19  08:35    


 


Potassium  3.4 mmol/L (3.5-5.1)  L  10/09/19  08:35    


 


Chloride  102 mmol/L ()   10/09/19  08:35    


 


Carbon Dioxide  28 mmol/L (21-32)   10/09/19  08:35    


 


Anion Gap  9 MMOL/L (8-16)   10/09/19  08:35    


 


BUN  9.2 mg/dL (7-18)   10/09/19  08:35    


 


Creatinine  0.9 mg/dL (0.55-1.3)   10/09/19  08:35    


 


Random Glucose  92 mg/dL ()   10/09/19  08:35    


 


Calcium  9.2 mg/dL (8.5-10.1)   10/09/19  08:35    


 


Total Bilirubin  0.7 mg/dL (0.2-1)   10/09/19  08:35    


 


AST  58 U/L (15-37)  H  10/09/19  08:35    


 


ALT  69 U/L (13-61)  H  10/09/19  08:35    


 


Alkaline Phosphatase  67 U/L ()   10/09/19  08:35    


 


Total Protein  7.1 g/dl (6.4-8.2)   10/09/19  08:35    


 


Albumin  3.7 g/dl (3.4-5.0)   10/09/19  08:35    








 CARDIAC ENZYMES











Creatine Kinase  134 U/L ()   10/04/19  12:30    


 


Troponin I  < 0.02 ng/ml (0.00-0.05)   10/04/19  12:30    








 Current Medications











Generic Name Dose Route Start Last Admin





  Trade Name Freq  PRN Reason Stop Dose Admin


 


Acetaminophen  650 mg  10/06/19 10:35  10/09/19 06:38





  Tylenol -  PO   650 mg





  Q6H PRN   Administration





  PAIN LEVEL 1-5   





     





     





     


 


Albuterol Sulfate  2 puff  10/05/19 08:04  





  Ventolin Hfa Inhaler -  IH   





  Q6H PRN   





  SHORT OF BREATH/WHEEZING   





     





     





     


 


Amiodarone HCl  200 mg  10/05/19 10:00  10/09/19 09:32





  Cordarone -  PO   200 mg





  BID RAEGAN   Administration





     





     





     





     


 


Budesonide/Formoterol Fumarate  1 puff  10/05/19 10:00  10/09/19 09:32





  Symbicort 160/4.5mcg -  IH   1 puff





  DAILY RAEGAN   Administration





     





     





     





     


 


Carvedilol  12.5 mg  10/05/19 10:00  10/09/19 09:31





  Coreg -  PO   12.5 mg





  BID RAEGAN   Administration





     





     





     





     


 


Folic Acid  1 mg  10/05/19 10:00  10/09/19 09:31





  Folic Acid -  PO   1 mg





  DAILY RAEGAN   Administration





     





     





     





     


 


Heparin Sodium (Porcine)  5,000 unit  10/04/19 22:00  10/09/19 14:15





  Heparin -  SQ   5,000 unit





  TID RAEGAN   Administration





     





     





     





     


 


Lactated Ringer's  1,000 ml in 1,000 mls @ 100 mls/hr  10/08/19 16:17  10/09/19 

04:26





  Lactated Ringers Solution  IV   100 mls/hr





  ASDIR RAEGAN   Administration





     





     





     





     


 


Ipratropium Bromide  1 amp  10/05/19 12:00  10/09/19 16:05





  Atrovent 0.02% Nebulizer -  NEB   1 amp





  RQID RAEGAN   Administration





     





     





     





     


 


Lidocaine  1 patch  10/04/19 17:45  10/09/19 09:31





  Lidoderm Patch -  TP   1 patch





  DAILY RAEGAN   Administration





     





     





     





     


 


Miscellaneous  1 each  10/04/19 22:00  10/08/19 21:52





  Lidoderm Patch Removal  MC   1 each





  DAILY@2200 RAEGAN   Administration





     





     





     





     


 


Morphine Sulfate  2 mg  10/08/19 16:18  10/09/19 11:26





  Morphine Sulfate  IVPUSH   2 mg





  Q6H PRN   Administration





  PAIN LEVEL 7 - 10   





     





     





     


 


Multivitamins/Minerals/Vitamin C  1 tab  10/05/19 10:00  10/09/19 09:31





  Tab-A-Vit -  PO   1 tab





  DAILY RAEGAN   Administration





     





     





     





     


 


Pantoprazole Sodium  40 mg  10/05/19 10:00  10/09/19 09:31





  Protonix -  PO   40 mg





  DAILY RAEGAN   Administration





     





     





     





     


 


Sacubitril/Valsartan  1 tab  10/05/19 10:00  10/09/19 09:31





  Entresto 24 Mg-26 Mg Tablet  PO   1 tab





  BID RAEGAN   Administration





     





     





     





     


 


Thiamine HCl  100 mg  10/05/19 10:00  10/09/19 09:31





  Vitamin B1 -  PO   100 mg





  DAILY RAEGAN   Administration





     





     





     





     








 Home Medications











 Medication  Instructions  Recorded


 


Clonazepam [Klonopin] 0.5 mg PO DAILY 10/05/19


 


Escitalopram Oxalate [Lexapro -] 20 mg PO DAILY 10/05/19


 


Gabapentin [Neurontin] 300 mg PO DAILY 10/05/19

















Assessment and plan: 


Patient is a 53 y/o man with h/o P AFib/A flutter, non ischemic cardiomyopathy, 

PFO, HTN, ETOH abuse, Asthma, who presented with a fall  while intoxicated. he 

was found to have C2 Fx and ETOH Withdrawal 





#S/p fall with  B/l C2 Fx on CT scan :  Cont hard collar. MRI of spine , neuro 

sx on the case


# Acute alcoholic pancreatitis . will advance diet as tolerated , no gall stones

, and NL TG. continue Ivf and morphine.  


# H/o chronic Systolic heart failure:stable cont coreg 


# H/o AFib/A flutter. now in sinus. He is not compliant with his eliquis 


- will hold eliquis until decision on any procedure for C2 Fx 


# ETOH WD:  Cont librium protocol . Cont folate, thiamine , and MVT. 


# HTN: Cont coreg and Entresto. 





DVT px: SQ heparin.

## 2019-10-10 LAB
ALBUMIN SERPL-MCNC: 3.6 G/DL (ref 3.4–5)
ALP SERPL-CCNC: 62 U/L (ref 45–117)
ALT SERPL-CCNC: 78 U/L (ref 13–61)
ANION GAP SERPL CALC-SCNC: 8 MMOL/L (ref 8–16)
AST SERPL-CCNC: 56 U/L (ref 15–37)
BASOPHILS # BLD: 1.2 % (ref 0–2)
BILIRUB SERPL-MCNC: 0.6 MG/DL (ref 0.2–1)
BUN SERPL-MCNC: 6.9 MG/DL (ref 7–18)
CALCIUM SERPL-MCNC: 9 MG/DL (ref 8.5–10.1)
CHLORIDE SERPL-SCNC: 105 MMOL/L (ref 98–107)
CO2 SERPL-SCNC: 26 MMOL/L (ref 21–32)
CREAT SERPL-MCNC: 0.9 MG/DL (ref 0.55–1.3)
DEPRECATED RDW RBC AUTO: 14.6 % (ref 11.9–15.9)
EOSINOPHIL # BLD: 7.9 % (ref 0–4.5)
GLUCOSE SERPL-MCNC: 148 MG/DL (ref 74–106)
HCT VFR BLD CALC: 38 % (ref 35.4–49)
HGB BLD-MCNC: 13 GM/DL (ref 11.7–16.9)
LYMPHOCYTES # BLD: 29.6 % (ref 8–40)
MCH RBC QN AUTO: 31.6 PG (ref 25.7–33.7)
MCHC RBC AUTO-ENTMCNC: 34.1 G/DL (ref 32–35.9)
MCV RBC: 92.6 FL (ref 80–96)
MONOCYTES # BLD AUTO: 12.4 % (ref 3.8–10.2)
NEUTROPHILS # BLD: 48.9 % (ref 42.8–82.8)
PLATELET # BLD AUTO: 196 K/MM3 (ref 134–434)
PMV BLD: 7.2 FL (ref 7.5–11.1)
POTASSIUM SERPLBLD-SCNC: 3.3 MMOL/L (ref 3.5–5.1)
PROT SERPL-MCNC: 6.7 G/DL (ref 6.4–8.2)
RBC # BLD AUTO: 4.11 M/MM3 (ref 4–5.6)
SODIUM SERPL-SCNC: 139 MMOL/L (ref 136–145)
WBC # BLD AUTO: 5.3 K/MM3 (ref 4–10)

## 2019-10-10 RX ADMIN — LIDOCAINE SCH PATCH: 50 PATCH TOPICAL at 09:56

## 2019-10-10 RX ADMIN — MULTIVITAMIN TABLET SCH TAB: TABLET at 09:56

## 2019-10-10 RX ADMIN — IPRATROPIUM BROMIDE SCH AMP: 0.5 SOLUTION RESPIRATORY (INHALATION) at 07:38

## 2019-10-10 RX ADMIN — MORPHINE SULFATE PRN MG: 2 INJECTION, SOLUTION INTRAMUSCULAR; INTRAVENOUS at 06:28

## 2019-10-10 RX ADMIN — CARVEDILOL SCH MG: 12.5 TABLET, FILM COATED ORAL at 09:57

## 2019-10-10 RX ADMIN — HEPARIN SODIUM SCH UNIT: 5000 INJECTION, SOLUTION INTRAVENOUS; SUBCUTANEOUS at 06:08

## 2019-10-10 RX ADMIN — CARVEDILOL SCH MG: 12.5 TABLET, FILM COATED ORAL at 21:31

## 2019-10-10 RX ADMIN — ACETAMINOPHEN PRN MG: 325 TABLET ORAL at 01:53

## 2019-10-10 RX ADMIN — SODIUM CHLORIDE, POTASSIUM CHLORIDE, SODIUM LACTATE AND CALCIUM CHLORIDE SCH MLS/HR: 600; 310; 30; 20 INJECTION, SOLUTION INTRAVENOUS at 23:14

## 2019-10-10 RX ADMIN — AMIODARONE HYDROCHLORIDE SCH MG: 200 TABLET ORAL at 09:56

## 2019-10-10 RX ADMIN — IPRATROPIUM BROMIDE SCH AMP: 0.5 SOLUTION RESPIRATORY (INHALATION) at 11:20

## 2019-10-10 RX ADMIN — SODIUM CHLORIDE, POTASSIUM CHLORIDE, SODIUM LACTATE AND CALCIUM CHLORIDE SCH MLS/HR: 600; 310; 30; 20 INJECTION, SOLUTION INTRAVENOUS at 01:38

## 2019-10-10 RX ADMIN — FOLIC ACID SCH MG: 1 TABLET ORAL at 09:57

## 2019-10-10 RX ADMIN — SODIUM CHLORIDE, POTASSIUM CHLORIDE, SODIUM LACTATE AND CALCIUM CHLORIDE SCH MLS/HR: 600; 310; 30; 20 INJECTION, SOLUTION INTRAVENOUS at 13:35

## 2019-10-10 RX ADMIN — BUDESONIDE AND FORMOTEROL FUMARATE DIHYDRATE SCH PUFF: 160; 4.5 AEROSOL RESPIRATORY (INHALATION) at 09:57

## 2019-10-10 RX ADMIN — HEPARIN SODIUM SCH UNIT: 5000 INJECTION, SOLUTION INTRAVENOUS; SUBCUTANEOUS at 21:31

## 2019-10-10 RX ADMIN — IPRATROPIUM BROMIDE SCH AMP: 0.5 SOLUTION RESPIRATORY (INHALATION) at 15:00

## 2019-10-10 RX ADMIN — AMIODARONE HYDROCHLORIDE SCH MG: 200 TABLET ORAL at 21:31

## 2019-10-10 RX ADMIN — SACUBITRIL AND VALSARTAN SCH TAB: 24; 26 TABLET, FILM COATED ORAL at 09:57

## 2019-10-10 RX ADMIN — IPRATROPIUM BROMIDE SCH AMP: 0.5 SOLUTION RESPIRATORY (INHALATION) at 19:35

## 2019-10-10 RX ADMIN — HEPARIN SODIUM SCH UNIT: 5000 INJECTION, SOLUTION INTRAVENOUS; SUBCUTANEOUS at 15:35

## 2019-10-10 RX ADMIN — Medication SCH MG: at 09:58

## 2019-10-10 RX ADMIN — PANTOPRAZOLE SODIUM SCH MG: 40 TABLET, DELAYED RELEASE ORAL at 09:57

## 2019-10-10 RX ADMIN — MORPHINE SULFATE PRN MG: 2 INJECTION, SOLUTION INTRAMUSCULAR; INTRAVENOUS at 23:26

## 2019-10-10 RX ADMIN — SACUBITRIL AND VALSARTAN SCH TAB: 24; 26 TABLET, FILM COATED ORAL at 21:40

## 2019-10-10 RX ADMIN — Medication SCH EACH: at 21:41

## 2019-10-10 NOTE — PN
Teaching Attending Note


Name of Resident: Dari Gibson





ATTENDING PHYSICIAN STATEMENT





I saw and evaluated the patient.


I reviewed the resident's note and discussed the case with the resident.


I agree with the resident's findings and plan as documented.








SUBJECTIVE:


Patient is comfortable, tolerating diet today. 


OBJECTIVE:


 Vital Signs











Temperature  97.3 F L  10/10/19 18:00


 


Pulse Rate  85   10/10/19 18:00


 


Respiratory Rate  20   10/10/19 18:00


 


Blood Pressure  143/89   10/10/19 18:00


 


O2 Sat by Pulse Oximetry (%)  96   10/08/19 09:00








GENERAL: The patient is awake, alert, and fully oriented, in no acute distress.


HEAD: Normal with no signs of trauma. in a hard neck Collar. 


EYES: PERRL, extraocular movements intact, sclera anicteric, conjunctiva clear. 


ENT: Ears normal,  oropharynx clear without exudates, moist mucous membranes.


NECK: Trachea midline, full range of motion, supple. 


LUNGS: Breath sounds equal, clear to auscultation bilaterally, no wheezes, no 

crackles, no accessory muscle use. 


HEART: Regular rate and rhythm, S1, S2 without murmur, rub or gallop.


ABDOMEN: Soft, mild midepigastric tenderness improving ,  ND, +BS,  no guarding

, no rebound, no hepatosplenomegaly, no masses.


EXTREMITIES: 2+ pulses, warm, well-perfused, no edema. 


NEUROLOGICAL: Cranial nerves II through XII grossly intact. Normal speech, gait 

not observed.


PSYCH: Normal mood, normal affect.


SKIN: Warm, dry, normal turgor, no rashes or lesions noted       CBCD











WBC  5.3 K/mm3 (4.0-10.0)   10/10/19  08:40    


 


RBC  4.11 M/mm3 (4.00-5.60)   10/10/19  08:40    


 


Hgb  13.0 GM/dL (11.7-16.9)   10/10/19  08:40    


 


Hct  38.0 % (35.4-49)   10/10/19  08:40    


 


MCV  92.6 fl (80-96)   10/10/19  08:40    


 


MCHC  34.1 g/dl (32.0-35.9)   10/10/19  08:40    


 


RDW  14.6 % (11.9-15.9)   10/10/19  08:40    


 


Plt Count  196 K/MM3 (134-434)   10/10/19  08:40    


 


MPV  7.2 fl (7.5-11.1)  L  10/10/19  08:40    








 CMP











Sodium  139 mmol/L (136-145)   10/10/19  08:40    


 


Potassium  3.3 mmol/L (3.5-5.1)  L  10/10/19  08:40    


 


Chloride  105 mmol/L ()   10/10/19  08:40    


 


Carbon Dioxide  26 mmol/L (21-32)   10/10/19  08:40    


 


Anion Gap  8 MMOL/L (8-16)   10/10/19  08:40    


 


BUN  6.9 mg/dL (7-18)  L  10/10/19  08:40    


 


Creatinine  0.9 mg/dL (0.55-1.3)   10/10/19  08:40    


 


Random Glucose  148 mg/dL ()  H  10/10/19  08:40    


 


Calcium  9.0 mg/dL (8.5-10.1)   10/10/19  08:40    


 


Total Bilirubin  0.6 mg/dL (0.2-1)   10/10/19  08:40    


 


AST  56 U/L (15-37)  H  10/10/19  08:40    


 


ALT  78 U/L (13-61)  H  10/10/19  08:40    


 


Alkaline Phosphatase  62 U/L ()   10/10/19  08:40    


 


Total Protein  6.7 g/dl (6.4-8.2)   10/10/19  08:40    


 


Albumin  3.6 g/dl (3.4-5.0)   10/10/19  08:40    








 CARDIAC ENZYMES











Creatine Kinase  134 U/L ()   10/04/19  12:30    


 


Troponin I  < 0.02 ng/ml (0.00-0.05)   10/04/19  12:30    








 Home Medications











 Medication  Instructions  Recorded


 


Clonazepam [Klonopin] 0.5 mg PO DAILY 10/05/19


 


Escitalopram Oxalate [Lexapro -] 20 mg PO DAILY 10/05/19


 


Gabapentin [Neurontin] 300 mg PO DAILY 10/05/19





 Current Medications











Generic Name Dose Route Start Last Admin





  Trade Name Freq  PRN Reason Stop Dose Admin


 


Acetaminophen  650 mg  10/06/19 10:35  10/10/19 01:53





  Tylenol -  PO   650 mg





  Q6H PRN   Administration





  PAIN LEVEL 1-5   





     





     





     


 


Albuterol Sulfate  2 puff  10/05/19 08:04  





  Ventolin Hfa Inhaler -  IH   





  Q6H PRN   





  SHORT OF BREATH/WHEEZING   





     





     





     


 


Amiodarone HCl  200 mg  10/05/19 10:00  10/10/19 09:56





  Cordarone -  PO   200 mg





  BID RAEGAN   Administration





     





     





     





     


 


Budesonide/Formoterol Fumarate  1 puff  10/05/19 10:00  10/10/19 09:57





  Symbicort 160/4.5mcg -  IH   1 puff





  DAILY RAEGAN   Administration





     





     





     





     


 


Carvedilol  12.5 mg  10/05/19 10:00  10/10/19 09:57





  Coreg -  PO   12.5 mg





  BID RAEGAN   Administration





     





     





     





     


 


Folic Acid  1 mg  10/05/19 10:00  10/10/19 09:57





  Folic Acid -  PO   1 mg





  DAILY RAEGAN   Administration





     





     





     





     


 


Heparin Sodium (Porcine)  5,000 unit  10/04/19 22:00  10/10/19 15:35





  Heparin -  SQ   5,000 unit





  TID RAEGAN   Administration





     





     





     





     


 


Lactated Ringer's  1,000 ml in 1,000 mls @ 100 mls/hr  10/08/19 16:17  10/10/19 

13:35





  Lactated Ringers Solution  IV   100 mls/hr





  ASDIR RAEGAN   Administration





     





     





     





     


 


Ipratropium Bromide  1 amp  10/05/19 12:00  10/10/19 19:35





  Atrovent 0.02% Nebulizer -  NEB   1 amp





  RQID RAEGAN   Administration





     





     





     





     


 


Lidocaine  1 patch  10/04/19 17:45  10/10/19 09:56





  Lidoderm Patch -  TP   1 patch





  DAILY RAEGAN   Administration





     





     





     





     


 


Miscellaneous  1 each  10/04/19 22:00  10/09/19 21:43





  Lidoderm Patch Removal  MC   1 each





  DAILY@2200 RAEGAN   Administration





     





     





     





     


 


Morphine Sulfate  2 mg  10/08/19 16:18  10/10/19 06:28





  Morphine Sulfate  IVPUSH   2 mg





  Q6H PRN   Administration





  PAIN LEVEL 7 - 10   





     





     





     


 


Multivitamins/Minerals/Vitamin C  1 tab  10/05/19 10:00  10/10/19 09:56





  Tab-A-Vit -  PO   1 tab





  DAILY RAEGAN   Administration





     





     





     





     


 


Ondansetron HCl  4 mg  10/10/19 16:36  





  Zofran Injection  IVPB   





  Q8H PRN   





  NAUSEA AND/OR VOMITING   





     





     





     


 


Sacubitril/Valsartan  1 tab  10/05/19 10:00  10/10/19 09:57





  Entresto 24 Mg-26 Mg Tablet  PO   1 tab





  BID RAEGAN   Administration





     





     





     





     


 


Thiamine HCl  100 mg  10/05/19 10:00  10/10/19 09:58





  Vitamin B1 -  PO   100 mg





  DAILY RAEGAN   Administration





     





     





     





     

















Assessment and plan: 


Patient is a 53 y/o man with h/o P AFib/A flutter, non ischemic cardiomyopathy, 

PFO, HTN, ETOH abuse, Asthma, who presented with a fall  while intoxicated. he 

was found to have C2 Fx and ETOH Withdrawal 





#S/p fall with  B/l C2 Fx on CT scan :  Cont hard collar. MRI of spine , neuro 

sx on the case


# Acute alcoholic pancreatitis . will advance diet as tolerated since 

tolerating the diet, no gall stones, and NL TG. continue Ivf and morphine.  


# H/o chronic Systolic heart failure:stable cont coreg 


# H/o AFib/A flutter. now in sinus. He is not compliant with his eliquis 


- will hold eliquis until decision on any procedure for C2 Fx 


# ETOH WD:  completed  librium protocol . Cont folate, thiamine , and MVT. 


# HTN: Cont coreg and Entresto. 





DVT px: SQ heparin. 





once tolerates diet will discharge the patient home.

## 2019-10-10 NOTE — PN.GI
GI Progress Note


Subjective: 





No acute events


Abdominal pain improved


Complains of nausea, no vomiting








- Objective


Vital Signs: 


 Vital Signs











Temperature  97.5 F L  10/10/19 14:08


 


Pulse Rate  69   10/10/19 14:08


 


Respiratory Rate  20   10/10/19 09:00


 


Blood Pressure  137/88   10/10/19 14:08


 


O2 Sat by Pulse Oximetry (%)  96   10/08/19 09:00











Constitutional: Calm


Eyes: No: Sclera Icterus


Cardiovascular: Yes: Regular Rate and Rhythm


Respiratory: Yes: Diminished (at bases bilaterally with poor insp effort)


Gastrointestinal Inspection: No: Distention


...Auscultate: Yes: Normoactive Bowel Sounds


...Palpate: Yes: Soft.  No: Hepatomegaly, Splenomegaly, Tenderness


...Percussion: No: Tympanitic


Edema: No (No LE edema)


Neurological: Yes: Alert


Labs: 


 CBC, BMP





 10/10/19 08:40 





 10/10/19 08:40 





 INR, PTT











INR  0.97  (0.83-1.09)   10/05/19  09:00    








 Hepatic Panel











Total Bilirubin  0.6 mg/dL (0.2-1)   10/10/19  08:40    


 


AST  56 U/L (15-37)  H  10/10/19  08:40    


 


ALT  78 U/L (13-61)  H  10/10/19  08:40    


 


Alkaline Phosphatase  62 U/L ()   10/10/19  08:40    


 


Albumin  3.6 g/dl (3.4-5.0)   10/10/19  08:40    














Problem List





- Problems


(1) Pancreatitis, alcoholic, acute


Assessment/Plan: 


Clinically improved


Stop following lipase levels. Follow clinically


Outpatient repeat imaging of pancreas if continued improvement, in  4 weeks


Advance diet as tolerated


If continued improvement, D/C IV fluids and advance to low fat diet


Advised the need for complete alcohol abstinence


 





Code(s): K85.20 - ALCOHOL INDUCED ACUTE PANCREATITIS WITHOUT NECROSIS OR INFCT 

  


Qualifiers: 


   Acute pancreatitis complication: no infection or necrosis   Qualified Code(s)

: K85.20 - Alcohol induced acute pancreatitis without necrosis or infection

## 2019-10-10 NOTE — PN
Physical Exam: 


SUBJECTIVE: Patient seen and examined at the bedside, reports pain is improved. 

However still having some pain with clear diet, despite this would like to 

attempt full liquid diet either this evening or tomorrow morning. 








OBJECTIVE:





 Vital Signs











 Period  Temp  Pulse  Resp  BP Sys/Tobias  Pulse Ox


 


 Last 24 Hr  97.3 F-98.4 F  69-82  20-20  137-154/  











GENERAL: The patient is awake, alert, and fully oriented, in no acute distress.


HEAD: Normal with no signs of trauma.


EYES: PERRL, extraocular movements intact, sclera anicteric, conjunctiva clear. 

No ptosis. horizontal nystagmus present but improving


ENT: Ears normal, nares patent, oropharynx clear without exudates, moist mucous 

membranes.


NECK: C-collar in place


LUNGS: Breath sounds equal, clear to auscultation bilaterally, no wheezes, some 

faint scattered wheezes in the inferior lung fields, no 


accessory muscle use. 


HEART: Regular rate and rhythm, S1, S2 without murmur, rub or gallop, no JVD


ABDOMEN: Soft, obese, very mildly tender to palpation in the LUQ, nondistended, 

normoactive bowel sounds


EXTREMITIES: 2+ pulses, warm, well-perfused, no edema, tremor not perceptible 

but able to be felt finger tip to finger tip.


NEUROLOGICAL: Cranial nerves II through XII grossly intact. Normal speech, gait 

not observed.


PSYCH: Normal mood, normal affect.


SKIN: Warm, dry, normal turgor, no rashes or lesions noted











 Laboratory Results - last 24 hr











  10/10/19 10/10/19





  08:40 08:40


 


WBC  5.3 


 


RBC  4.11 


 


Hgb  13.0 


 


Hct  38.0 


 


MCV  92.6 


 


MCH  31.6 


 


MCHC  34.1 


 


RDW  14.6 


 


Plt Count  196 


 


MPV  7.2 L 


 


Absolute Neuts (auto)  2.6 


 


Neutrophils %  48.9 


 


Lymphocytes %  29.6  D 


 


Monocytes %  12.4 H 


 


Eosinophils %  7.9 H 


 


Basophils %  1.2 


 


Nucleated RBC %  0 


 


Sodium   139


 


Potassium   3.3 L


 


Chloride   105


 


Carbon Dioxide   26


 


Anion Gap   8


 


BUN   6.9 L


 


Creatinine   0.9


 


Est GFR (CKD-EPI)AfAm   110.27


 


Est GFR (CKD-EPI)NonAf   95.14


 


Random Glucose   148 H


 


Calcium   9.0


 


Total Bilirubin   0.6


 


AST   56 H


 


ALT   78 H


 


Alkaline Phosphatase   62


 


Total Protein   6.7


 


Albumin   3.6








Active Medications











Generic Name Dose Route Start Last Admin





  Trade Name Freq  PRN Reason Stop Dose Admin


 


Acetaminophen  650 mg  10/06/19 10:35  10/10/19 01:53





  Tylenol -  PO   650 mg





  Q6H PRN   Administration





  PAIN LEVEL 1-5   





     





     





     


 


Albuterol Sulfate  2 puff  10/05/19 08:04  





  Ventolin Hfa Inhaler -  IH   





  Q6H PRN   





  SHORT OF BREATH/WHEEZING   





     





     





     


 


Amiodarone HCl  200 mg  10/05/19 10:00  10/10/19 09:56





  Cordarone -  PO   200 mg





  BID RAEGAN   Administration





     





     





     





     


 


Budesonide/Formoterol Fumarate  1 puff  10/05/19 10:00  10/10/19 09:57





  Symbicort 160/4.5mcg -  IH   1 puff





  DAILY RAEGAN   Administration





     





     





     





     


 


Carvedilol  12.5 mg  10/05/19 10:00  10/10/19 09:57





  Coreg -  PO   12.5 mg





  BID RAEGAN   Administration





     





     





     





     


 


Folic Acid  1 mg  10/05/19 10:00  10/10/19 09:57





  Folic Acid -  PO   1 mg





  DAILY RAEGAN   Administration





     





     





     





     


 


Heparin Sodium (Porcine)  5,000 unit  10/04/19 22:00  10/10/19 06:08





  Heparin -  SQ   5,000 unit





  TID RAEGAN   Administration





     





     





     





     


 


Lactated Ringer's  1,000 ml in 1,000 mls @ 100 mls/hr  10/08/19 16:17  10/10/19 

01:38





  Lactated Ringers Solution  IV   100 mls/hr





  ASDIR RAEGAN   Administration





     





     





     





     


 


Ipratropium Bromide  1 amp  10/05/19 12:00  10/10/19 15:00





  Atrovent 0.02% Nebulizer -  NEB   1 amp





  RQID RAEGAN   Administration





     





     





     





     


 


Lidocaine  1 patch  10/04/19 17:45  10/10/19 09:56





  Lidoderm Patch -  TP   1 patch





  DAILY RAEGAN   Administration





     





     





     





     


 


Miscellaneous  1 each  10/04/19 22:00  10/09/19 21:43





  Lidoderm Patch Removal  MC   1 each





  DAILY@2200 RAEGAN   Administration





     





     





     





     


 


Morphine Sulfate  2 mg  10/08/19 16:18  10/10/19 06:28





  Morphine Sulfate  IVPUSH   2 mg





  Q6H PRN   Administration





  PAIN LEVEL 7 - 10   





     





     





     


 


Multivitamins/Minerals/Vitamin C  1 tab  10/05/19 10:00  10/10/19 09:56





  Tab-A-Vit -  PO   1 tab





  DAILY RAEGAN   Administration





     





     





     





     


 


Ondansetron HCl  4 mg  10/10/19 16:36  





  Zofran Injection  IVPB   





  Q8H PRN   





  NAUSEA AND/OR VOMITING   





     





     





     


 


Sacubitril/Valsartan  1 tab  10/05/19 10:00  10/10/19 09:57





  Entresto 24 Mg-26 Mg Tablet  PO   1 tab





  BID RAEGAN   Administration





     





     





     





     


 


Thiamine HCl  100 mg  10/05/19 10:00  10/10/19 09:58





  Vitamin B1 -  PO   100 mg





  DAILY RAEGAN   Administration





     





     





     





     














Imaging:


- Head/c-spine CT showed oblique oriented fracture at junction of lateral mass 

and body of C2 with mild step-off, cortical disruption. Subtle nondisplaced fx 

similar location contralateral side. Neck supple, not rigid/tender.





ASSESSMENT/PLAN:


Mr. Charles is a 54 year old man with a past medical history of afib (not on 

eliquis), HTN, CHF (EF35%), asthma, and alcohol abuse who presents to the ED s/

p 2 falls in his bedroom while drunk. 





1. Fall 2/2 acute alcohol intoxication with resultant bilateral C2 Fx on CT 

scan 


   - continue C-collar (hard)


   - Neurosurgers (dr. pineda) consulted, no surgical intervention at this time


   - MRI c-spine still > MRI showing chronic fx at C2 without edema


   - lidocaine patch


   - tylenol prn for pain


   - ibuprofen prn for pain


   - cont morphine 





2- Acute pancreatitis most likely alcohol induced. No gall stones, and NL TG. 

Nl  Calcium 


   - cont IVF LR @ 100cc- patient did not appear vol overloaded on physical 

exam this morning. Lungs sounded clear and chest x ray yesterday without 

evidence of vol overload. Will continue to monitor closely and will decrease 

rate of fluids should patient begin to show signs of vol overload. 


   - CT scan showing some peripancreatic fat stranding, consistent with 

pancreatitis, no evidence of necrosis 


   - advance diet to full liquid diet, if continues to improve can d/c IV 

fluids and advance to low fat diet


   - cont morphine for now, will consider transitioning to toradol once patient'

s pancreatitis is improving


   - GI following, appreciate recommendations; Mild pancreatitis by Gaye and 

other criteria.  Would continue with pain management and advance diet as 

tolerated.  OK to advance diet to clear liquids at this time. If unable to 

tolerate dietary advance, would consider parenteral nutrition. 


- Outpatient repeat imaging of pancreas if continued improvement, in  4 weeks





3- Back pain:


   - patient complaining of new/ worsening back pain, will obtain lumbar/ 

thoracic ct to r/o any nerve impingement or compression





4. Alcohol withdrawal- no evidence of wernicke's, mild tremor, good 

coordination without cerebellar signs. No asterixis. 


   - resolved


   - PO thiamine daily


   - PO folate daily


   - PO multivitamin





5. CHF


   -check Cxray to evaluate for pulm congestion as he has new cough and is on 

high vol fluids for pancreatitis 


   - monitor on IVF 


   - careful rehydration to avoid vol overload


   - Patient reported he does not have refills on his meds because he is 

uninsured and unable to follow up with his doctors. Will continue his old 

medications and discuss with social work about helping patient to apply for 

insurance


   - plan to have pt follow up at [facility]


   - will discuss with patient in the AM whether he wished to resume/ pay for 

eliquis or if he would prefer coumadin (with the understanding that it will 

require frequent blood draws and close follow up)





6. Afib


   - will hold AC for now pending results of lumbar thoracic CT


   - will consider resuming after result of above CT 


   - continuing coreg for now








7. HTN: Cont coreg and Entresto





FEN


- LR@100cc/hr


- replete lytes PRN


- low fat diet





DVT px :  SQ heparin. can hold for any procedure





Dispo: advancing diet as tolerated. Outpatient repeat imaging of pancreas if 

continued improvement, in  4 weeks.











Visit type





- Emergency Visit


Emergency Visit: Yes


ED Registration Date: 10/04/19


Care time: The patient presented to the Emergency Department on the above date 

and was hospitalized for further evaluation of their emergent condition.





- New Patient


This patient is new to me today: No





- Critical Care


Critical Care patient: No





- Discharge Referral


Referred to [facility] No





ATTENDING PHYSICIAN STATEMENT





I saw and evaluated the patient.


I reviewed the resident's note and discussed the case with the resident.


I agree with the resident's findings and plan as documented.








SUBJECTIVE:








OBJECTIVE:








ASSESSMENT AND PLAN:

## 2019-10-11 LAB
ALBUMIN SERPL-MCNC: 3.6 G/DL (ref 3.4–5)
ALP SERPL-CCNC: 61 U/L (ref 45–117)
ALT SERPL-CCNC: 77 U/L (ref 13–61)
ANION GAP SERPL CALC-SCNC: 9 MMOL/L (ref 8–16)
AST SERPL-CCNC: 50 U/L (ref 15–37)
BASOPHILS # BLD: 0.8 % (ref 0–2)
BILIRUB SERPL-MCNC: 0.5 MG/DL (ref 0.2–1)
BUN SERPL-MCNC: 7.1 MG/DL (ref 7–18)
CALCIUM SERPL-MCNC: 9.2 MG/DL (ref 8.5–10.1)
CHLORIDE SERPL-SCNC: 103 MMOL/L (ref 98–107)
CO2 SERPL-SCNC: 25 MMOL/L (ref 21–32)
CREAT SERPL-MCNC: 0.9 MG/DL (ref 0.55–1.3)
DEPRECATED RDW RBC AUTO: 14.6 % (ref 11.9–15.9)
EOSINOPHIL # BLD: 6.5 % (ref 0–4.5)
GLUCOSE SERPL-MCNC: 159 MG/DL (ref 74–106)
HCT VFR BLD CALC: 39.6 % (ref 35.4–49)
HGB BLD-MCNC: 13.1 GM/DL (ref 11.7–16.9)
LYMPHOCYTES # BLD: 24 % (ref 8–40)
MCH RBC QN AUTO: 31 PG (ref 25.7–33.7)
MCHC RBC AUTO-ENTMCNC: 33.1 G/DL (ref 32–35.9)
MCV RBC: 93.7 FL (ref 80–96)
MONOCYTES # BLD AUTO: 12.6 % (ref 3.8–10.2)
NEUTROPHILS # BLD: 56.1 % (ref 42.8–82.8)
PLATELET # BLD AUTO: 205 K/MM3 (ref 134–434)
PMV BLD: 7.9 FL (ref 7.5–11.1)
POTASSIUM SERPLBLD-SCNC: 3.5 MMOL/L (ref 3.5–5.1)
PROT SERPL-MCNC: 6.7 G/DL (ref 6.4–8.2)
RBC # BLD AUTO: 4.22 M/MM3 (ref 4–5.6)
SODIUM SERPL-SCNC: 137 MMOL/L (ref 136–145)
WBC # BLD AUTO: 6.1 K/MM3 (ref 4–10)

## 2019-10-11 RX ADMIN — IPRATROPIUM BROMIDE SCH AMP: 0.5 SOLUTION RESPIRATORY (INHALATION) at 20:11

## 2019-10-11 RX ADMIN — SODIUM CHLORIDE, POTASSIUM CHLORIDE, SODIUM LACTATE AND CALCIUM CHLORIDE SCH MLS/HR: 600; 310; 30; 20 INJECTION, SOLUTION INTRAVENOUS at 09:29

## 2019-10-11 RX ADMIN — LIDOCAINE SCH PATCH: 50 PATCH TOPICAL at 09:34

## 2019-10-11 RX ADMIN — CARVEDILOL SCH MG: 12.5 TABLET, FILM COATED ORAL at 21:51

## 2019-10-11 RX ADMIN — Medication SCH EACH: at 21:52

## 2019-10-11 RX ADMIN — CARVEDILOL SCH MG: 12.5 TABLET, FILM COATED ORAL at 09:34

## 2019-10-11 RX ADMIN — HEPARIN SODIUM SCH UNIT: 5000 INJECTION, SOLUTION INTRAVENOUS; SUBCUTANEOUS at 05:55

## 2019-10-11 RX ADMIN — Medication SCH MG: at 09:34

## 2019-10-11 RX ADMIN — AMIODARONE HYDROCHLORIDE SCH MG: 200 TABLET ORAL at 09:34

## 2019-10-11 RX ADMIN — IPRATROPIUM BROMIDE SCH AMP: 0.5 SOLUTION RESPIRATORY (INHALATION) at 12:10

## 2019-10-11 RX ADMIN — IPRATROPIUM BROMIDE SCH AMP: 0.5 SOLUTION RESPIRATORY (INHALATION) at 07:35

## 2019-10-11 RX ADMIN — SACUBITRIL AND VALSARTAN SCH TAB: 24; 26 TABLET, FILM COATED ORAL at 09:45

## 2019-10-11 RX ADMIN — HEPARIN SODIUM SCH UNIT: 5000 INJECTION, SOLUTION INTRAVENOUS; SUBCUTANEOUS at 21:49

## 2019-10-11 RX ADMIN — AMIODARONE HYDROCHLORIDE SCH MG: 200 TABLET ORAL at 21:49

## 2019-10-11 RX ADMIN — MULTIVITAMIN TABLET SCH TAB: TABLET at 09:34

## 2019-10-11 RX ADMIN — HEPARIN SODIUM SCH UNIT: 5000 INJECTION, SOLUTION INTRAVENOUS; SUBCUTANEOUS at 14:32

## 2019-10-11 RX ADMIN — ALBUTEROL SULFATE PRN PUFF: 90 AEROSOL, METERED RESPIRATORY (INHALATION) at 05:55

## 2019-10-11 RX ADMIN — IPRATROPIUM BROMIDE SCH AMP: 0.5 SOLUTION RESPIRATORY (INHALATION) at 15:40

## 2019-10-11 RX ADMIN — FOLIC ACID SCH MG: 1 TABLET ORAL at 09:34

## 2019-10-11 RX ADMIN — BUDESONIDE AND FORMOTEROL FUMARATE DIHYDRATE SCH PUFF: 160; 4.5 AEROSOL RESPIRATORY (INHALATION) at 09:44

## 2019-10-11 RX ADMIN — SACUBITRIL AND VALSARTAN SCH TAB: 24; 26 TABLET, FILM COATED ORAL at 21:51

## 2019-10-11 NOTE — PN
Physical Exam: 


SUBJECTIVE: Patient seen and examined at the bedside, improving. Will try low 

fat lactose free diet today. 








OBJECTIVE:





 Vital Signs











 Period  Temp  Pulse  Resp  BP Sys/Tobias  Pulse Ox


 


 Last 24 Hr  97.3 F-98.7 F  70-86  20-20  143-157/  











GENERAL: The patient is awake, alert, and fully oriented, in no acute distress.


HEAD: Normal with no signs of trauma.


EYES: PERRL, extraocular movements intact, sclera anicteric, conjunctiva clear. 

No ptosis. horizontal nystagmus present but improving


ENT: Ears normal, nares patent, oropharynx clear without exudates, moist mucous 

membranes.


NECK: C-collar in place


LUNGS: Breath sounds equal, clear to auscultation bilaterally, no wheezes, some 

faint scattered wheezes in the inferior lung fields, no 


accessory muscle use. 


HEART: Regular rate and rhythm, S1, S2 without murmur, rub or gallop, no JVD


ABDOMEN: Soft, obese, very mildly tender to palpation in the LUQ, nondistended, 

normoactive bowel sounds


EXTREMITIES: 2+ pulses, warm, well-perfused, no edema, tremor not perceptible 

but able to be felt finger tip to finger tip.


NEUROLOGICAL: Cranial nerves II through XII grossly intact. Normal speech, gait 

not observed.


PSYCH: Normal mood, normal affect.


SKIN: Warm, dry, normal turgor, no rashes or lesions noted














 Laboratory Results - last 24 hr











  10/11/19 10/11/19





  09:05 09:05


 


WBC  6.1 


 


RBC  4.22 


 


Hgb  13.1 


 


Hct  39.6 


 


MCV  93.7 


 


MCH  31.0 


 


MCHC  33.1 


 


RDW  14.6 


 


Plt Count  205 


 


MPV  7.9 


 


Absolute Neuts (auto)  3.4 


 


Neutrophils %  56.1 


 


Lymphocytes %  24.0 


 


Monocytes %  12.6 H 


 


Eosinophils %  6.5 H 


 


Basophils %  0.8 


 


Nucleated RBC %  0 


 


Sodium   137


 


Potassium   3.5


 


Chloride   103


 


Carbon Dioxide   25


 


Anion Gap   9


 


BUN   7.1


 


Creatinine   0.9


 


Est GFR (CKD-EPI)AfAm   110.27


 


Est GFR (CKD-EPI)NonAf   95.14


 


Random Glucose   159 H


 


Calcium   9.2


 


Total Bilirubin   0.5


 


AST   50 H


 


ALT   77 H


 


Alkaline Phosphatase   61


 


Total Protein   6.7


 


Albumin   3.6








Active Medications











Generic Name Dose Route Start Last Admin





  Trade Name Freq  PRN Reason Stop Dose Admin


 


Acetaminophen  650 mg  10/06/19 10:35  10/10/19 01:53





  Tylenol -  PO   650 mg





  Q6H PRN   Administration





  PAIN LEVEL 1-5   





     





     





     


 


Albuterol Sulfate  2 puff  10/05/19 08:04  10/11/19 05:55





  Ventolin Hfa Inhaler -  IH   2 puff





  Q6H PRN   Administration





  SHORT OF BREATH/WHEEZING   





     





     





     


 


Amiodarone HCl  200 mg  10/05/19 10:00  10/11/19 09:34





  Cordarone -  PO   200 mg





  BID RAEGAN   Administration





     





     





     





     


 


Budesonide/Formoterol Fumarate  1 puff  10/05/19 10:00  10/11/19 09:44





  Symbicort 160/4.5mcg -  IH   1 puff





  DAILY RAEGAN   Administration





     





     





     





     


 


Carvedilol  12.5 mg  10/05/19 10:00  10/11/19 09:34





  Coreg -  PO   12.5 mg





  BID RAEGAN   Administration





     





     





     





     


 


Folic Acid  1 mg  10/05/19 10:00  10/11/19 09:34





  Folic Acid -  PO   1 mg





  DAILY RAEGAN   Administration





     





     





     





     


 


Heparin Sodium (Porcine)  5,000 unit  10/04/19 22:00  10/11/19 14:32





  Heparin -  SQ   5,000 unit





  TID RAEGAN   Administration





     





     





     





     


 


Ipratropium Bromide  1 amp  10/05/19 12:00  10/11/19 15:40





  Atrovent 0.02% Nebulizer -  NEB   1 amp





  RQID RAEGAN   Administration





     





     





     





     


 


Lidocaine  1 patch  10/04/19 17:45  10/11/19 09:34





  Lidoderm Patch -  TP   1 patch





  DAILY RAEGAN   Administration





     





     





     





     


 


Miscellaneous  1 each  10/04/19 22:00  10/10/19 21:41





  Lidoderm Patch Removal  MC   1 each





  DAILY@2200 RAEGAN   Administration





     





     





     





     


 


Multivitamins/Minerals/Vitamin C  1 tab  10/05/19 10:00  10/11/19 09:34





  Tab-A-Vit -  PO   1 tab





  DAILY RAEGAN   Administration





     





     





     





     


 


Ondansetron HCl  4 mg  10/10/19 16:36  





  Zofran Injection  IVPB   





  Q8H PRN   





  NAUSEA AND/OR VOMITING   





     





     





     


 


Oxycodone HCl  5 mg  10/11/19 09:07  





  Roxicodone -  PO   





  Q6H PRN   





  PAIN LEVEL 1-5   





     





     





     


 


Oxycodone HCl  10 mg  10/11/19 09:07  10/11/19 11:37





  Roxicodone -  PO   10 mg





  Q6H PRN   Administration





  PAIN LEVEL 6-10   





     





     





     


 


Sacubitril/Valsartan  1 tab  10/05/19 10:00  10/11/19 09:45





  Entresto 24 Mg-26 Mg Tablet  PO   1 tab





  BID RAEGAN   Administration





     





     





     





     


 


Thiamine HCl  100 mg  10/05/19 10:00  10/11/19 09:34





  Vitamin B1 -  PO   100 mg





  DAILY RAEGAN   Administration





     





     





     





     














Imaging:


- Head/c-spine CT showed oblique oriented fracture at junction of lateral mass 

and body of C2 with mild step-off, cortical disruption. Subtle nondisplaced fx 

similar location contralateral side. Neck supple, not rigid/tender.





ASSESSMENT/PLAN:


Mr. Charles is a 54 year old man with a past medical history of afib (not on 

eliquis), HTN, CHF (EF35%), asthma, and alcohol abuse who presents to the ED s/

p 2 falls in his bedroom while drunk. 





1. Fall 2/2 acute alcohol intoxication with resultant bilateral C2 Fx on CT 

scan 


   - continue C-collar (hard), per Dr. Escudero if the patient does not have 

neck pain can be switched to a soft collar for two weeks. If the patient begins 

to have pain should switch back to hard collar. 


   - Neurosurgers (Dr. Escudero ) consulted, no surgical intervention at this 

time


   - MRI c-spine still > MRI showing chronic fx at C2 without edema


   - lidocaine patch


   - tylenol prn for pain


   - ibuprofen prn for pain


   - d/c morphine 





2- Acute pancreatitis most likely alcohol induced. No gall stones, and NL TG. 

Nl  Calcium 


   - cont IVF LR @ 100cc- patient did not appear vol overloaded on physical 

exam this morning. Lungs sounded clear and chest x ray yesterday without 

evidence of vol overload. Will continue to monitor closely and will decrease 

rate of fluids should patient begin to show signs of vol overload. 


   - CT scan showing some peripancreatic fat stranding, consistent with 

pancreatitis, no evidence of necrosis 


   - d/c IV fluids and advance to low fat diet


   - d/c morphine> now oxy 5 or 10 depending on pain level 


   - GI following, appreciate recommendations


- Outpatient repeat imaging of pancreas if continued improvement, in  4 weeks





3- Back pain:


   - patient complaining of new/ worsening back pain, will obtain lumbar/ 

thoracic ct to r/o any nerve impingement or compression





4. Alcohol withdrawal- no evidence of wernicke's, mild tremor, good 

coordination without cerebellar signs. No asterixis. 


   - resolved


   - PO thiamine daily


   - PO folate daily


   - PO multivitamin





5. CHF 


   - d/c IVF 


   - Patient reported he does not have refills on his meds because he is 

uninsured and unable to follow up with his doctors. Will continue his old 

medications and discuss with social work about helping patient to apply for 

insurance


   - plan to have pt follow up at [facility]


   - will discuss with patient in the AM whether he wished to resume/ pay for 

eliquis or if he would prefer coumadin (with the understanding that it will 

require frequent blood draws and close follow up)





6. Afib


   - will hold AC for now pending results of lumbar thoracic CT


   - will consider resuming after result of above CT 


   - continuing coreg for now








7. HTN: Cont coreg and Entresto





FEN


-PO fluids


- replete lytes PRN


- low fat diet





DVT px :  SQ heparin. can hold for any procedure





Dispo: advancing diet as tolerated. Outpatient repeat imaging of pancreas if 

continued improvement, in  4 weeks.








Visit type





- Emergency Visit


Emergency Visit: Yes


ED Registration Date: 10/04/19


Care time: The patient presented to the Emergency Department on the above date 

and was hospitalized for further evaluation of their emergent condition.





- New Patient


This patient is new to me today: No





- Critical Care


Critical Care patient: No





- Discharge Referral


Referred to [facility] No





ATTENDING PHYSICIAN STATEMENT





I saw and evaluated the patient.


I reviewed the resident's note and discussed the case with the resident.


I agree with the resident's findings and plan as documented.








SUBJECTIVE:








OBJECTIVE:








ASSESSMENT AND PLAN:

## 2019-10-11 NOTE — PN.GI
GI Progress Note


Subjective: 





No acute events


some abdominal cramping, otherwise improved





- Objective


Vital Signs: 


 Vital Signs











Temperature  98.5 F   10/11/19 06:00


 


Pulse Rate  70   10/11/19 06:00


 


Respiratory Rate  20   10/11/19 06:00


 


Blood Pressure  157/109 H  10/11/19 06:00


 


O2 Sat by Pulse Oximetry (%)  96   10/08/19 09:00











Constitutional: Calm


Eyes: No: Sclera Icterus


Cardiovascular: Yes: Regular Rate and Rhythm


Respiratory: Yes: CTA Bilaterally


Gastrointestinal Inspection: No: Distention


...Auscultate: Yes: Normoactive Bowel Sounds


...Palpate: Yes: Soft.  No: Hepatomegaly, Splenomegaly, Tenderness


...Percussion: No: Tympanitic


Edema: No (No LE edema)


Neurological: Yes: Alert


Labs: 


 INR, PTT











INR  0.97  (0.83-1.09)   10/05/19  09:00    














Problem List





- Problems


(1) Pancreatitis, alcoholic, acute


Assessment/Plan: 


Clinically improved:


Advance diet: low fat 


If continued improvement, repeat imaging of pancreas in 4 weeks as outpatient


Code(s): K85.20 - ALCOHOL INDUCED ACUTE PANCREATITIS WITHOUT NECROSIS OR INFCT 

  


Qualifiers: 


   Acute pancreatitis complication: no infection or necrosis   Qualified Code(s)

: K85.20 - Alcohol induced acute pancreatitis without necrosis or infection

## 2019-10-11 NOTE — PN
Teaching Attending Note


Name of Resident: Dari Gibson





ATTENDING PHYSICIAN STATEMENT





I saw and evaluated the patient.


I reviewed the resident's note and discussed the case with the resident.


I agree with the resident's findings and plan as documented.








SUBJECTIVE:


Patient feels better with no acute distress, no nausea or vomiting. would like 

the food to be advanced. 


OBJECTIVE:


 Vital Signs











Temperature  98.7 F   10/11/19 15:00


 


Pulse Rate  86   10/11/19 15:00


 


Respiratory Rate  20   10/11/19 15:00


 


Blood Pressure  148/96   10/11/19 15:00


 


O2 Sat by Pulse Oximetry (%)  96   10/08/19 09:00








GENERAL: The patient is awake, alert, and fully oriented, in no acute distress.


HEAD: Normal with no signs of trauma. in a hard neck Collar. 


EYES: PERRL, extraocular movements intact, sclera anicteric, conjunctiva clear. 


ENT: Ears normal,  oropharynx clear without exudates, moist mucous membranes.


NECK: Trachea midline, full range of motion, supple. 


LUNGS: Breath sounds equal, clear to auscultation bilaterally, no wheezes, no 

crackles, no accessory muscle use. 


HEART: Regular rate and rhythm, S1, S2 without murmur, rub or gallop.


ABDOMEN: Soft, mild midepigastric tenderness improving ,  ND, +BS,  no guarding

, no rebound, no hepatosplenomegaly, no masses.


EXTREMITIES: 2+ pulses, warm, well-perfused, no edema. 


NEUROLOGICAL: Cranial nerves II through XII grossly intact. Normal speech, gait 

not observed.


PSYCH: Normal mood, normal affect.


SKIN: Warm, dry, normal turgor, no rashes or lesions noted           CBCD











WBC  6.1 K/mm3 (4.0-10.0)   10/11/19  09:05    


 


RBC  4.22 M/mm3 (4.00-5.60)   10/11/19  09:05    


 


Hgb  13.1 GM/dL (11.7-16.9)   10/11/19  09:05    


 


Hct  39.6 % (35.4-49)   10/11/19  09:05    


 


MCV  93.7 fl (80-96)   10/11/19  09:05    


 


MCHC  33.1 g/dl (32.0-35.9)   10/11/19  09:05    


 


RDW  14.6 % (11.9-15.9)   10/11/19  09:05    


 


Plt Count  205 K/MM3 (134-434)   10/11/19  09:05    


 


MPV  7.9 fl (7.5-11.1)   10/11/19  09:05    








 CMP











Sodium  137 mmol/L (136-145)   10/11/19  09:05    


 


Potassium  3.5 mmol/L (3.5-5.1)   10/11/19  09:05    


 


Chloride  103 mmol/L ()   10/11/19  09:05    


 


Carbon Dioxide  25 mmol/L (21-32)   10/11/19  09:05    


 


Anion Gap  9 MMOL/L (8-16)   10/11/19  09:05    


 


BUN  7.1 mg/dL (7-18)   10/11/19  09:05    


 


Creatinine  0.9 mg/dL (0.55-1.3)   10/11/19  09:05    


 


Random Glucose  159 mg/dL ()  H  10/11/19  09:05    


 


Calcium  9.2 mg/dL (8.5-10.1)   10/11/19  09:05    


 


Total Bilirubin  0.5 mg/dL (0.2-1)   10/11/19  09:05    


 


AST  50 U/L (15-37)  H  10/11/19  09:05    


 


ALT  77 U/L (13-61)  H  10/11/19  09:05    


 


Alkaline Phosphatase  61 U/L ()   10/11/19  09:05    


 


Total Protein  6.7 g/dl (6.4-8.2)   10/11/19  09:05    


 


Albumin  3.6 g/dl (3.4-5.0)   10/11/19  09:05    








 CARDIAC ENZYMES











Creatine Kinase  134 U/L ()   10/04/19  12:30    


 


Troponin I  < 0.02 ng/ml (0.00-0.05)   10/04/19  12:30    








 Current Medications











Generic Name Dose Route Start Last Admin





  Trade Name Freq  PRN Reason Stop Dose Admin


 


Acetaminophen  650 mg  10/06/19 10:35  10/10/19 01:53





  Tylenol -  PO   650 mg





  Q6H PRN   Administration





  PAIN LEVEL 1-5   





     





     





     


 


Albuterol Sulfate  2 puff  10/05/19 08:04  10/11/19 05:55





  Ventolin Hfa Inhaler -  IH   2 puff





  Q6H PRN   Administration





  SHORT OF BREATH/WHEEZING   





     





     





     


 


Amiodarone HCl  200 mg  10/05/19 10:00  10/11/19 09:34





  Cordarone -  PO   200 mg





  BID RAEGAN   Administration





     





     





     





     


 


Budesonide/Formoterol Fumarate  1 puff  10/05/19 10:00  10/11/19 09:44





  Symbicort 160/4.5mcg -  IH   1 puff





  DAILY RAEGAN   Administration





     





     





     





     


 


Carvedilol  12.5 mg  10/05/19 10:00  10/11/19 09:34





  Coreg -  PO   12.5 mg





  BID RAEGAN   Administration





     





     





     





     


 


Folic Acid  1 mg  10/05/19 10:00  10/11/19 09:34





  Folic Acid -  PO   1 mg





  DAILY RAEGAN   Administration





     





     





     





     


 


Heparin Sodium (Porcine)  5,000 unit  10/04/19 22:00  10/11/19 14:32





  Heparin -  SQ   5,000 unit





  TID RAEGAN   Administration





     





     





     





     


 


Ipratropium Bromide  1 amp  10/05/19 12:00  10/11/19 15:40





  Atrovent 0.02% Nebulizer -  NEB   1 amp





  RQID RAEGAN   Administration





     





     





     





     


 


Lidocaine  1 patch  10/04/19 17:45  10/11/19 09:34





  Lidoderm Patch -  TP   1 patch





  DAILY RAEGAN   Administration





     





     





     





     


 


Miscellaneous  1 each  10/04/19 22:00  10/10/19 21:41





  Lidoderm Patch Removal  MC   1 each





  DAILY@2200 RAEGAN   Administration





     





     





     





     


 


Multivitamins/Minerals/Vitamin C  1 tab  10/05/19 10:00  10/11/19 09:34





  Tab-A-Vit -  PO   1 tab





  DAILY RAEGAN   Administration





     





     





     





     


 


Ondansetron HCl  4 mg  10/10/19 16:36  





  Zofran Injection  IVPB   





  Q8H PRN   





  NAUSEA AND/OR VOMITING   





     





     





     


 


Oxycodone HCl  5 mg  10/11/19 09:07  





  Roxicodone -  PO   





  Q6H PRN   





  PAIN LEVEL 1-5   





     





     





     


 


Oxycodone HCl  10 mg  10/11/19 09:07  10/11/19 11:37





  Roxicodone -  PO   10 mg





  Q6H PRN   Administration





  PAIN LEVEL 6-10   





     





     





     


 


Sacubitril/Valsartan  1 tab  10/05/19 10:00  10/11/19 09:45





  Entresto 24 Mg-26 Mg Tablet  PO   1 tab





  BID RAEGAN   Administration





     





     





     





     


 


Thiamine HCl  100 mg  10/05/19 10:00  10/11/19 09:34





  Vitamin B1 -  PO   100 mg





  DAILY RAEGAN   Administration





     





     





     





     








 Home Medications











 Medication  Instructions  Recorded


 


Clonazepam [Klonopin] 0.5 mg PO DAILY 10/05/19


 


Escitalopram Oxalate [Lexapro -] 20 mg PO DAILY 10/05/19


 


Gabapentin [Neurontin] 300 mg PO DAILY 10/05/19

















Assessment and plan: 


Patient is a 55 y/o man with h/o P AFib/A flutter, non ischemic cardiomyopathy, 

PFO, HTN, ETOH abuse, Asthma, who presented with a fall  while intoxicated. he 

was found to have C2 Fx and ETOH Withdrawal 





#S/p fall with  B/l C2 Fx on CT scan :  Cont hard collar.  neuro sx on the case


# Acute alcoholic pancreatitis . advance diet as tolerated, low fat diet. 

oxycodone for pain, If continued improvement, repeat imaging of pancreas in 4 

weeks as outpatient


# H/o chronic Systolic heart failure: stable cont coreg 


# H/o AFib/A flutter. now in sinus. He is not compliant with his Eliquis 


# ETOH WD: completed  librium protocol . Cont folate, thiamine , and MVT. 


# HTN: Cont coreg and Entresto. 





DVT px: SQ heparin. 





once tolerates diet will discharge the patient home.

## 2019-10-12 LAB
ALBUMIN SERPL-MCNC: 3.6 G/DL (ref 3.4–5)
ALP SERPL-CCNC: 64 U/L (ref 45–117)
ALT SERPL-CCNC: 72 U/L (ref 13–61)
ANION GAP SERPL CALC-SCNC: 7 MMOL/L (ref 8–16)
AST SERPL-CCNC: 41 U/L (ref 15–37)
BASOPHILS # BLD: 1 % (ref 0–2)
BILIRUB SERPL-MCNC: 0.5 MG/DL (ref 0.2–1)
BUN SERPL-MCNC: 10.7 MG/DL (ref 7–18)
CALCIUM SERPL-MCNC: 9.3 MG/DL (ref 8.5–10.1)
CHLORIDE SERPL-SCNC: 102 MMOL/L (ref 98–107)
CO2 SERPL-SCNC: 26 MMOL/L (ref 21–32)
CREAT SERPL-MCNC: 0.9 MG/DL (ref 0.55–1.3)
DEPRECATED RDW RBC AUTO: 14.3 % (ref 11.9–15.9)
EOSINOPHIL # BLD: 6.7 % (ref 0–4.5)
GLUCOSE SERPL-MCNC: 145 MG/DL (ref 74–106)
HCT VFR BLD CALC: 38.6 % (ref 35.4–49)
HGB BLD-MCNC: 13 GM/DL (ref 11.7–16.9)
LYMPHOCYTES # BLD: 23.2 % (ref 8–40)
MCH RBC QN AUTO: 31.3 PG (ref 25.7–33.7)
MCHC RBC AUTO-ENTMCNC: 33.8 G/DL (ref 32–35.9)
MCV RBC: 92.7 FL (ref 80–96)
MONOCYTES # BLD AUTO: 14.2 % (ref 3.8–10.2)
NEUTROPHILS # BLD: 54.9 % (ref 42.8–82.8)
PLATELET # BLD AUTO: 223 K/MM3 (ref 134–434)
PMV BLD: 8.2 FL (ref 7.5–11.1)
POTASSIUM SERPLBLD-SCNC: 3.7 MMOL/L (ref 3.5–5.1)
PROT SERPL-MCNC: 6.8 G/DL (ref 6.4–8.2)
RBC # BLD AUTO: 4.16 M/MM3 (ref 4–5.6)
SODIUM SERPL-SCNC: 135 MMOL/L (ref 136–145)
WBC # BLD AUTO: 6.2 K/MM3 (ref 4–10)

## 2019-10-12 RX ADMIN — MULTIVITAMIN TABLET SCH TAB: TABLET at 09:33

## 2019-10-12 RX ADMIN — HEPARIN SODIUM SCH UNIT: 5000 INJECTION, SOLUTION INTRAVENOUS; SUBCUTANEOUS at 06:37

## 2019-10-12 RX ADMIN — HEPARIN SODIUM SCH UNIT: 5000 INJECTION, SOLUTION INTRAVENOUS; SUBCUTANEOUS at 21:58

## 2019-10-12 RX ADMIN — LIDOCAINE SCH PATCH: 50 PATCH TOPICAL at 09:33

## 2019-10-12 RX ADMIN — IPRATROPIUM BROMIDE SCH AMP: 0.5 SOLUTION RESPIRATORY (INHALATION) at 12:46

## 2019-10-12 RX ADMIN — Medication SCH EACH: at 21:59

## 2019-10-12 RX ADMIN — BUDESONIDE AND FORMOTEROL FUMARATE DIHYDRATE SCH PUFF: 160; 4.5 AEROSOL RESPIRATORY (INHALATION) at 09:35

## 2019-10-12 RX ADMIN — IPRATROPIUM BROMIDE SCH AMP: 0.5 SOLUTION RESPIRATORY (INHALATION) at 16:25

## 2019-10-12 RX ADMIN — AMIODARONE HYDROCHLORIDE SCH MG: 200 TABLET ORAL at 21:58

## 2019-10-12 RX ADMIN — FOLIC ACID SCH MG: 1 TABLET ORAL at 09:33

## 2019-10-12 RX ADMIN — Medication SCH MG: at 09:33

## 2019-10-12 RX ADMIN — IPRATROPIUM BROMIDE SCH AMP: 0.5 SOLUTION RESPIRATORY (INHALATION) at 09:03

## 2019-10-12 RX ADMIN — HEPARIN SODIUM SCH UNIT: 5000 INJECTION, SOLUTION INTRAVENOUS; SUBCUTANEOUS at 14:25

## 2019-10-12 RX ADMIN — AMIODARONE HYDROCHLORIDE SCH MG: 200 TABLET ORAL at 09:33

## 2019-10-12 RX ADMIN — IPRATROPIUM BROMIDE SCH AMP: 0.5 SOLUTION RESPIRATORY (INHALATION) at 20:50

## 2019-10-12 RX ADMIN — CARVEDILOL SCH MG: 12.5 TABLET, FILM COATED ORAL at 09:33

## 2019-10-12 RX ADMIN — SACUBITRIL AND VALSARTAN SCH TAB: 24; 26 TABLET, FILM COATED ORAL at 21:59

## 2019-10-12 RX ADMIN — CARVEDILOL SCH MG: 12.5 TABLET, FILM COATED ORAL at 21:58

## 2019-10-12 RX ADMIN — SACUBITRIL AND VALSARTAN SCH TAB: 24; 26 TABLET, FILM COATED ORAL at 09:35

## 2019-10-12 NOTE — PN
Progress Note (short form)





- Note


Progress Note: 





Patient is feeling better with no acute distress. tolerating diet as in a very 

small amount. 





 Vital Signs











Temperature  97.7 F   10/12/19 05:03


 


Pulse Rate  68   10/12/19 05:03


 


Respiratory Rate  20   10/12/19 05:03


 


Blood Pressure  127/68   10/12/19 05:03


 


O2 Sat by Pulse Oximetry (%)  96   10/08/19 09:00








GENERAL: The patient is awake, alert, and fully oriented, in no acute distress.


HEAD: Normal with no signs of trauma. in a hard neck Collar. 


EYES: PERRL, extraocular movements intact, sclera anicteric, conjunctiva clear. 


ENT: Ears normal,  oropharynx clear without exudates, moist mucous membranes.


NECK: Trachea midline, full range of motion, supple. 


LUNGS: Breath sounds equal, clear to auscultation bilaterally, no wheezes, no 

crackles, no accessory muscle use. 


HEART: Regular rate and rhythm, S1, S2 without murmur, rub or gallop.


ABDOMEN: Soft, mild midepigastric tenderness improving ,  ND, +BS,  no guarding

, no rebound, no hepatosplenomegaly, no masses.


EXTREMITIES: 2+ pulses, warm, well-perfused, no edema. 


NEUROLOGICAL: Cranial nerves II through XII grossly intact. Normal speech, gait 

not observed.


PSYCH: Normal mood, normal affect.


SKIN: Warm, dry, normal turgor, no rashes or lesions noted                  CBCD











WBC  6.2 K/mm3 (4.0-10.0)   10/12/19  09:40    


 


RBC  4.16 M/mm3 (4.00-5.60)   10/12/19  09:40    


 


Hgb  13.0 GM/dL (11.7-16.9)   10/12/19  09:40    


 


Hct  38.6 % (35.4-49)   10/12/19  09:40    


 


MCV  92.7 fl (80-96)   10/12/19  09:40    


 


MCHC  33.8 g/dl (32.0-35.9)   10/12/19  09:40    


 


RDW  14.3 % (11.9-15.9)   10/12/19  09:40    


 


Plt Count  223 K/MM3 (134-434)   10/12/19  09:40    


 


MPV  8.2 fl (7.5-11.1)   10/12/19  09:40    








 CMP











Sodium  137 mmol/L (136-145)   10/11/19  09:05    


 


Potassium  3.5 mmol/L (3.5-5.1)   10/11/19  09:05    


 


Chloride  103 mmol/L ()   10/11/19  09:05    


 


Carbon Dioxide  25 mmol/L (21-32)   10/11/19  09:05    


 


Anion Gap  9 MMOL/L (8-16)   10/11/19  09:05    


 


BUN  7.1 mg/dL (7-18)   10/11/19  09:05    


 


Creatinine  0.9 mg/dL (0.55-1.3)   10/11/19  09:05    


 


Random Glucose  159 mg/dL ()  H  10/11/19  09:05    


 


Calcium  9.2 mg/dL (8.5-10.1)   10/11/19  09:05    


 


Total Bilirubin  0.5 mg/dL (0.2-1)   10/11/19  09:05    


 


AST  50 U/L (15-37)  H  10/11/19  09:05    


 


ALT  77 U/L (13-61)  H  10/11/19  09:05    


 


Alkaline Phosphatase  61 U/L ()   10/11/19  09:05    


 


Total Protein  6.7 g/dl (6.4-8.2)   10/11/19  09:05    


 


Albumin  3.6 g/dl (3.4-5.0)   10/11/19  09:05    








 CARDIAC ENZYMES











Creatine Kinase  134 U/L ()   10/04/19  12:30    


 


Troponin I  < 0.02 ng/ml (0.00-0.05)   10/04/19  12:30    








 Current Medications











Generic Name Dose Route Start Last Admin





  Trade Name Freq  PRN Reason Stop Dose Admin


 


Acetaminophen  650 mg  10/06/19 10:35  10/10/19 01:53





  Tylenol -  PO   650 mg





  Q6H PRN   Administration





  PAIN LEVEL 1-5   





     





     





     


 


Albuterol Sulfate  2 puff  10/05/19 08:04  10/11/19 05:55





  Ventolin Hfa Inhaler -  IH   2 puff





  Q6H PRN   Administration





  SHORT OF BREATH/WHEEZING   





     





     





     


 


Amiodarone HCl  200 mg  10/05/19 10:00  10/12/19 09:33





  Cordarone -  PO   200 mg





  BID RAEGAN   Administration





     





     





     





     


 


Budesonide/Formoterol Fumarate  1 puff  10/05/19 10:00  10/12/19 09:35





  Symbicort 160/4.5mcg -  IH   1 puff





  DAILY RAEGAN   Administration





     





     





     





     


 


Carvedilol  12.5 mg  10/05/19 10:00  10/12/19 09:33





  Coreg -  PO   12.5 mg





  BID RAEGAN   Administration





     





     





     





     


 


Folic Acid  1 mg  10/05/19 10:00  10/12/19 09:33





  Folic Acid -  PO   1 mg





  DAILY RAEGAN   Administration





     





     





     





     


 


Heparin Sodium (Porcine)  5,000 unit  10/04/19 22:00  10/12/19 06:37





  Heparin -  SQ   5,000 unit





  TID RAEGAN   Administration





     





     





     





     


 


Ipratropium Bromide  1 amp  10/05/19 12:00  10/12/19 09:03





  Atrovent 0.02% Nebulizer -  NEB   1 amp





  RQID RAEGAN   Administration





     





     





     





     


 


Lidocaine  1 patch  10/04/19 17:45  10/12/19 09:33





  Lidoderm Patch -  TP   1 patch





  DAILY RAEGAN   Administration





     





     





     





     


 


Miscellaneous  1 each  10/04/19 22:00  10/11/19 21:52





  Lidoderm Patch Removal  MC   1 each





  DAILY@2200 RAEGAN   Administration





     





     





     





     


 


Multivitamins/Minerals/Vitamin C  1 tab  10/05/19 10:00  10/12/19 09:33





  Tab-A-Vit -  PO   1 tab





  DAILY RAEGAN   Administration





     





     





     





     


 


Ondansetron HCl  4 mg  10/10/19 16:36  10/11/19 23:28





  Zofran Injection  IVPB   4 mg





  Q8H PRN   Administration





  NAUSEA AND/OR VOMITING   





     





     





     


 


Oxycodone HCl  5 mg  10/11/19 09:07  





  Roxicodone -  PO   





  Q6H PRN   





  PAIN LEVEL 1-5   





     





     





     


 


Oxycodone HCl  10 mg  10/11/19 09:07  10/12/19 10:19





  Roxicodone -  PO   10 mg





  Q6H PRN   Administration





  PAIN LEVEL 6-10   





     





     





     


 


Sacubitril/Valsartan  1 tab  10/05/19 10:00  10/12/19 09:35





  Entresto 24 Mg-26 Mg Tablet  PO   1 tab





  BID RAEGAN   Administration





     





     





     





     


 


Thiamine HCl  100 mg  10/05/19 10:00  10/12/19 09:33





  Vitamin B1 -  PO   100 mg





  DAILY RAEGAN   Administration





     





     





     





     








 Home Medications











 Medication  Instructions  Recorded


 


Clonazepam [Klonopin] 0.5 mg PO DAILY 10/05/19


 


Escitalopram Oxalate [Lexapro -] 20 mg PO DAILY 10/05/19


 


Gabapentin [Neurontin] 300 mg PO DAILY 10/05/19








Assessment and plan: 


Patient is a 55 y/o man with h/o P AFib/A flutter, non ischemic cardiomyopathy, 

PFO, HTN, ETOH abuse, Asthma, who presented with a fall  while intoxicated. he 

was found to have C2 Fx and ETOH Withdrawal 





#S/p fall with  B/l C2 Fx on CT scan :  Cont hard collar as per neurosurgeon , 

patient needs to wear it for one month. 


# Acute alcoholic pancreatitis . advance diet as tolerated, low fat diet. 

oxycodone for pain, If continued improvement, repeat imaging of pancreas in 4 

weeks as outpatient


# H/o chronic Systolic heart failure: stable cont coreg 


# H/o AFib/A flutter. now in sinus. Patient cannot be on Eliquis since patient s

/p C2 fx s/p fall, and drinks alcohol . patient is not compliant with Eliquis 

at this time. Risk > than the benefit. 


# ETOH WD: completed librium protocol . Cont folate, thiamine , and MVT. 


# HTN: Cont coreg , as per patient cannot continue taking Entresto since 

patient cannot afford. 





DVT px: SQ heparin. 


once tolerates diet will discharge the patient home. 








Visit type





- Emergency Visit


Emergency Visit: Yes


ED Registration Date: 10/04/19


Care time: The patient presented to the Emergency Department on the above date 

and was hospitalized for further evaluation of their emergent condition.





- New Patient


This patient is new to me today: No





- Critical Care


Critical Care patient: No





- Discharge Referral


Referred to [facility] No

## 2019-10-13 RX ADMIN — HEPARIN SODIUM SCH UNIT: 5000 INJECTION, SOLUTION INTRAVENOUS; SUBCUTANEOUS at 06:40

## 2019-10-13 RX ADMIN — IPRATROPIUM BROMIDE SCH AMP: 0.5 SOLUTION RESPIRATORY (INHALATION) at 08:50

## 2019-10-13 RX ADMIN — ALBUTEROL SULFATE PRN PUFF: 90 AEROSOL, METERED RESPIRATORY (INHALATION) at 09:33

## 2019-10-13 RX ADMIN — LIDOCAINE SCH PATCH: 50 PATCH TOPICAL at 09:30

## 2019-10-13 RX ADMIN — IPRATROPIUM BROMIDE SCH AMP: 0.5 SOLUTION RESPIRATORY (INHALATION) at 16:42

## 2019-10-13 RX ADMIN — CARVEDILOL SCH MG: 12.5 TABLET, FILM COATED ORAL at 09:32

## 2019-10-13 RX ADMIN — SACUBITRIL AND VALSARTAN SCH TAB: 24; 26 TABLET, FILM COATED ORAL at 09:31

## 2019-10-13 RX ADMIN — MULTIVITAMIN TABLET SCH TAB: TABLET at 09:31

## 2019-10-13 RX ADMIN — HEPARIN SODIUM SCH UNIT: 5000 INJECTION, SOLUTION INTRAVENOUS; SUBCUTANEOUS at 14:12

## 2019-10-13 RX ADMIN — Medication SCH MG: at 09:32

## 2019-10-13 RX ADMIN — Medication SCH EACH: at 21:04

## 2019-10-13 RX ADMIN — IPRATROPIUM BROMIDE SCH AMP: 0.5 SOLUTION RESPIRATORY (INHALATION) at 21:05

## 2019-10-13 RX ADMIN — BUDESONIDE AND FORMOTEROL FUMARATE DIHYDRATE SCH PUFF: 160; 4.5 AEROSOL RESPIRATORY (INHALATION) at 11:17

## 2019-10-13 RX ADMIN — HEPARIN SODIUM SCH UNIT: 5000 INJECTION, SOLUTION INTRAVENOUS; SUBCUTANEOUS at 21:03

## 2019-10-13 RX ADMIN — IPRATROPIUM BROMIDE SCH AMP: 0.5 SOLUTION RESPIRATORY (INHALATION) at 12:30

## 2019-10-13 RX ADMIN — AMIODARONE HYDROCHLORIDE SCH MG: 200 TABLET ORAL at 09:32

## 2019-10-13 RX ADMIN — CARVEDILOL SCH MG: 12.5 TABLET, FILM COATED ORAL at 21:04

## 2019-10-13 RX ADMIN — FOLIC ACID SCH MG: 1 TABLET ORAL at 09:32

## 2019-10-13 NOTE — PN
Physical Exam: 


SUBJECTIVE: Patient seen and examined at the bedside, there were no acute 

events overnight. 








OBJECTIVE:





 Vital Signs











 Period  Temp  Pulse  Resp  BP Sys/Tobias  Pulse Ox


 


 Last 24 Hr  97.6 F-98.2 F  65-85  20-20  117-136/78-90  











GENERAL: The patient is awake, alert, and fully oriented, in no acute distress.


HEAD: Normal with no signs of trauma.


EYES: PERRL, extraocular movements intact, sclera anicteric, conjunctiva clear. 

No ptosis. 


ENT: Ears normal, nares patent, oropharynx clear without exudates, moist mucous 

membranes.


NECK: C-collar in place


LUNGS: Breath sounds equal, clear to auscultation bilaterally, no wheezes, some 

faint scattered wheezes in the inferior lung fields, no 


accessory muscle use. 


HEART: Regular rate and rhythm, S1, S2 without murmur, rub or gallop, no JVD


ABDOMEN: Soft, obese, non- tender to palpation, nondistended, normoactive bowel 

sounds


EXTREMITIES: 2+ pulses, warm, well-perfused, no edema, 


NEUROLOGICAL: Cranial nerves II through XII grossly intact. Normal speech, gait 

not observed.


PSYCH: Normal mood, normal affect.


SKIN: Warm, dry, normal turgor, no rashes or lesions noted














Active Medications











Generic Name Dose Route Start Last Admin





  Trade Name Freq  PRN Reason Stop Dose Admin


 


Acetaminophen  650 mg  10/06/19 10:35  10/10/19 01:53





  Tylenol -  PO   650 mg





  Q6H PRN   Administration





  PAIN LEVEL 1-5   





     





     





     


 


Albuterol Sulfate  2 puff  10/05/19 08:04  10/13/19 09:33





  Ventolin Hfa Inhaler -  IH   2 puff





  Q6H PRN   Administration





  SHORT OF BREATH/WHEEZING   





     





     





     


 


Aspirin  81 mg  10/14/19 10:00  





  Ecotrin -  PO   





  DAILY RAEGAN   





     





     





     





     


 


Budesonide/Formoterol Fumarate  1 puff  10/05/19 10:00  10/13/19 11:17





  Symbicort 160/4.5mcg -  IH   1 puff





  DAILY RAEGAN   Administration





     





     





     





     


 


Carvedilol  12.5 mg  10/05/19 10:00  10/13/19 09:32





  Coreg -  PO   12.5 mg





  BID RAEGAN   Administration





     





     





     





     


 


Folic Acid  1 mg  10/05/19 10:00  10/13/19 09:32





  Folic Acid -  PO   1 mg





  DAILY RAEGAN   Administration





     





     





     





     


 


Heparin Sodium (Porcine)  5,000 unit  10/04/19 22:00  10/13/19 14:12





  Heparin -  SQ   5,000 unit





  TID RAEGAN   Administration





     





     





     





     


 


Ipratropium Bromide  1 amp  10/05/19 12:00  10/13/19 08:50





  Atrovent 0.02% Nebulizer -  NEB   1 amp





  RQID RAEGAN   Administration





     





     





     





     


 


Lidocaine  1 patch  10/04/19 17:45  10/13/19 09:30





  Lidoderm Patch -  TP   1 patch





  DAILY RAEGAN   Administration





     





     





     





     


 


Miscellaneous  1 each  10/04/19 22:00  10/12/19 21:59





  Lidoderm Patch Removal  MC   1 each





  DAILY@2200 RAEGAN   Administration





     





     





     





     


 


Multivitamins/Minerals/Vitamin C  1 tab  10/05/19 10:00  10/13/19 09:31





  Tab-A-Vit -  PO   1 tab





  DAILY RAEGAN   Administration





     





     





     





     


 


Ondansetron HCl  4 mg  10/10/19 16:36  10/11/19 23:28





  Zofran Injection  IVPB   4 mg





  Q8H PRN   Administration





  NAUSEA AND/OR VOMITING   





     





     





     


 


Oxycodone HCl  5 mg  10/11/19 09:07  





  Roxicodone -  PO   





  Q6H PRN   





  PAIN LEVEL 1-5   





     





     





     


 


Oxycodone HCl  10 mg  10/11/19 09:07  10/13/19 12:24





  Roxicodone -  PO   10 mg





  Q6H PRN   Administration





  PAIN LEVEL 6-10   





     





     





     


 


Thiamine HCl  100 mg  10/05/19 10:00  10/13/19 09:32





  Vitamin B1 -  PO   100 mg





  DAILY RAEGAN   Administration





     





     





     





     


 


Valsartan  40 mg  10/14/19 10:00  





  Diovan -  PO   





  [facility]   





     





     





     





     











Imaging:


- Head/c-spine CT showed oblique oriented fracture at junction of lateral mass 

and body of C2 with mild step-off, cortical disruption. Subtle nondisplaced fx 

similar location contralateral side. Neck supple, not rigid/tender.





ASSESSMENT/PLAN:


Mr. Charles is a 54 year old man with a past medical history of afib (not on 

eliquis), HTN, CHF (EF35%), asthma, and alcohol abuse who presents to the ED s/

p 2 falls in his bedroom while drunk. 





1. Fall 2/2 acute alcohol intoxication with resultant bilateral C2 Fx on CT 

scan 


   - Neurosurgers (Dr. Escudero ) consulted, no surgical intervention at this 

time


   - MRI c-spine still > MRI showing chronic fx at C2 without edema


   - continue C-collar (hard) for 4 weeks until he follows up in Dr. Escudero's 

office. 


   - lidocaine patch


   - tylenol prn for pain


   - ibuprofen prn for pain





2- Acute pancreatitis most likely alcohol induced. No gall stones, and NL TG. 

Nl  Calcium 


   - CT scan showing some peripancreatic fat stranding, consistent with 

pancreatitis, no evidence of necrosis 


   - tolerating low fat diet


   - oxy 5 or 10 depending on pain level 


   - GI following, appreciate recommendations


- Outpatient repeat imaging of pancreas if continued improvement, in  4 weeks





3- Back pain:


   - patient complaining of new/ worsening back pain, will obtain lumbar/ 

thoracic ct to r/o any nerve impingement or compression





4. Alcohol withdrawal- no evidence of wernicke's, mild tremor, good 

coordination without cerebellar signs. No asterixis. 


   - resolved


   - PO thiamine daily


   - PO folate daily


   - PO multivitamin





5. CHF 


   - Patient reported he does not have refills on his meds because he is 

uninsured and unable to follow up with his doctors. Will continue his old 

medications and discuss with social work about helping patient to apply for 

insurance


   - plan to have pt follow up at [facility] ([facility])


   - patient does not wish to resume eliquis due to the cost. Further, will not 

d/c the patient on AC now as he is a fall risk with recent history of falls 

resulting in fractures. The patient is very high risk of bleed and so we will d/

c him on ASA 81 until he is no longer consuming alcohol regularly. 





6. Afib


   - will hold AC for now pending results of lumbar thoracic CT


   - will consider resuming after result of above CT 


   - continuing coreg for now








7. HTN: Cont coreg


- holding amiodarine for now as patient felt in the past and currently it makes 

his breathing worse. Will also observe off medications for 1 day before 

discharging home with instructions and a referral to follow up with cardiology 

within 1 week of discharge. 





FEN


-PO fluids


- replete lytes PRN


- low fat diet





DVT px :  SQ heparin. 





Dispo: advancing diet as tolerated. Outpatient repeat imaging of pancreas if 

continued improvement, in  4 weeks.











Visit type





- Emergency Visit


Emergency Visit: Yes


ED Registration Date: 10/04/19


Care time: The patient presented to the Emergency Department on the above date 

and was hospitalized for further evaluation of their emergent condition.





- New Patient


This patient is new to me today: No





- Critical Care


Critical Care patient: No





- Discharge Referral


Referred to [facility] No





ATTENDING PHYSICIAN STATEMENT





I saw and evaluated the patient.


I reviewed the resident's note and discussed the case with the resident.


I agree with the resident's findings and plan as documented.








SUBJECTIVE:








OBJECTIVE:








ASSESSMENT AND PLAN:

## 2019-10-13 NOTE — PN
Teaching Attending Note


Name of Resident: Dari Gibson





ATTENDING PHYSICIAN STATEMENT





I saw and evaluated the patient.


I reviewed the resident's note and discussed the case with the resident.


I agree with the resident's findings and plan as documented.








SUBJECTIVE:


Patient is able to tolerate diet today with no acute distress. 


OBJECTIVE:


 Vital Signs











Temperature  98.2 F   10/13/19 05:00


 


Pulse Rate  65   10/13/19 05:00


 


Respiratory Rate  20   10/13/19 05:00


 


Blood Pressure  130/90   10/13/19 05:00


 


O2 Sat by Pulse Oximetry (%)  96   10/08/19 09:00








GENERAL: The patient is awake, alert, and fully oriented, in no acute distress.


HEAD: Normal with no signs of trauma. in a hard neck Collar. 


EYES: PERRL, extraocular movements intact, sclera anicteric, conjunctiva clear. 


ENT: Ears normal,  oropharynx clear without exudates, moist mucous membranes.


NECK: Trachea midline, full range of motion, supple. 


LUNGS: Breath sounds equal, clear to auscultation bilaterally, no wheezes, no 

crackles, no accessory muscle use. 


HEART: Regular rate and rhythm, S1, S2 without murmur, rub or gallop.


ABDOMEN: Soft, no further abdominal tenderness ,  ND, +BS,  no guarding, no 

rebound, no hepatosplenomegaly, no masses.


EXTREMITIES: 2+ pulses, warm, well-perfused, no edema. 


NEUROLOGICAL: Cranial nerves II through XII grossly intact. Normal speech, gait 

not observed.


PSYCH: Normal mood, normal affect.


SKIN: Warm, dry, normal turgor, no rashes or lesions noted 


 CBCD











WBC  6.2 K/mm3 (4.0-10.0)   10/12/19  09:40    


 


RBC  4.16 M/mm3 (4.00-5.60)   10/12/19  09:40    


 


Hgb  13.0 GM/dL (11.7-16.9)   10/12/19  09:40    


 


Hct  38.6 % (35.4-49)   10/12/19  09:40    


 


MCV  92.7 fl (80-96)   10/12/19  09:40    


 


MCHC  33.8 g/dl (32.0-35.9)   10/12/19  09:40    


 


RDW  14.3 % (11.9-15.9)   10/12/19  09:40    


 


Plt Count  223 K/MM3 (134-434)   10/12/19  09:40    


 


MPV  8.2 fl (7.5-11.1)   10/12/19  09:40    








 CMP











Sodium  135 mmol/L (136-145)  L  10/12/19  09:40    


 


Potassium  3.7 mmol/L (3.5-5.1)   10/12/19  09:40    


 


Chloride  102 mmol/L ()   10/12/19  09:40    


 


Carbon Dioxide  26 mmol/L (21-32)   10/12/19  09:40    


 


Anion Gap  7 MMOL/L (8-16)  L  10/12/19  09:40    


 


BUN  10.7 mg/dL (7-18)   10/12/19  09:40    


 


Creatinine  0.9 mg/dL (0.55-1.3)   10/12/19  09:40    


 


Random Glucose  145 mg/dL ()  H  10/12/19  09:40    


 


Calcium  9.3 mg/dL (8.5-10.1)   10/12/19  09:40    


 


Total Bilirubin  0.5 mg/dL (0.2-1)   10/12/19  09:40    


 


AST  41 U/L (15-37)  H  10/12/19  09:40    


 


ALT  72 U/L (13-61)  H  10/12/19  09:40    


 


Alkaline Phosphatase  64 U/L ()   10/12/19  09:40    


 


Total Protein  6.8 g/dl (6.4-8.2)   10/12/19  09:40    


 


Albumin  3.6 g/dl (3.4-5.0)   10/12/19  09:40    








 CARDIAC ENZYMES











Creatine Kinase  134 U/L ()   10/04/19  12:30    


 


Troponin I  < 0.02 ng/ml (0.00-0.05)   10/04/19  12:30    








 Current Medications











Generic Name Dose Route Start Last Admin





  Trade Name Freq  PRN Reason Stop Dose Admin


 


Acetaminophen  650 mg  10/06/19 10:35  10/10/19 01:53





  Tylenol -  PO   650 mg





  Q6H PRN   Administration





  PAIN LEVEL 1-5   





     





     





     


 


Albuterol Sulfate  2 puff  10/05/19 08:04  10/13/19 09:33





  Ventolin Hfa Inhaler -  IH   2 puff





  Q6H PRN   Administration





  SHORT OF BREATH/WHEEZING   





     





     





     


 


Amiodarone HCl  200 mg  10/05/19 10:00  10/13/19 09:32





  Cordarone -  PO   200 mg





  BID RAEGAN   Administration





     





     





     





     


 


Budesonide/Formoterol Fumarate  1 puff  10/05/19 10:00  10/12/19 09:35





  Symbicort 160/4.5mcg -  IH   1 puff





  DAILY RAEGAN   Administration





     





     





     





     


 


Carvedilol  12.5 mg  10/05/19 10:00  10/13/19 09:32





  Coreg -  PO   12.5 mg





  BID RAEGAN   Administration





     





     





     





     


 


Folic Acid  1 mg  10/05/19 10:00  10/13/19 09:32





  Folic Acid -  PO   1 mg





  DAILY RAEGAN   Administration





     





     





     





     


 


Heparin Sodium (Porcine)  5,000 unit  10/04/19 22:00  10/13/19 06:40





  Heparin -  SQ   5,000 unit





  TID RAEGAN   Administration





     





     





     





     


 


Ipratropium Bromide  1 amp  10/05/19 12:00  10/13/19 08:50





  Atrovent 0.02% Nebulizer -  NEB   1 amp





  RQID RAEGAN   Administration





     





     





     





     


 


Lidocaine  1 patch  10/04/19 17:45  10/13/19 09:30





  Lidoderm Patch -  TP   1 patch





  DAILY RAEGAN   Administration





     





     





     





     


 


Miscellaneous  1 each  10/04/19 22:00  10/12/19 21:59





  Lidoderm Patch Removal  MC   1 each





  DAILY@2200 RAEGAN   Administration





     





     





     





     


 


Multivitamins/Minerals/Vitamin C  1 tab  10/05/19 10:00  10/13/19 09:31





  Tab-A-Vit -  PO   1 tab





  DAILY RAEGAN   Administration





     





     





     





     


 


Ondansetron HCl  4 mg  10/10/19 16:36  10/11/19 23:28





  Zofran Injection  IVPB   4 mg





  Q8H PRN   Administration





  NAUSEA AND/OR VOMITING   





     





     





     


 


Oxycodone HCl  5 mg  10/11/19 09:07  





  Roxicodone -  PO   





  Q6H PRN   





  PAIN LEVEL 1-5   





     





     





     


 


Oxycodone HCl  10 mg  10/11/19 09:07  10/13/19 02:29





  Roxicodone -  PO   10 mg





  Q6H PRN   Administration





  PAIN LEVEL 6-10   





     





     





     


 


Sacubitril/Valsartan  1 tab  10/05/19 10:00  10/13/19 09:31





  Entresto 24 Mg-26 Mg Tablet  PO   1 tab





  BID RAEGAN   Administration





     





     





     





     


 


Thiamine HCl  100 mg  10/05/19 10:00  10/13/19 09:32





  Vitamin B1 -  PO   100 mg





  DAILY RAEGAN   Administration





     





     





     





     








 Home Medications











 Medication  Instructions  Recorded


 


Clonazepam [Klonopin] 0.5 mg PO DAILY 10/05/19


 


Escitalopram Oxalate [Lexapro -] 20 mg PO DAILY 10/05/19


 


Gabapentin [Neurontin] 300 mg PO DAILY 10/05/19











Assessment and plan: 


Patient is a 53 y/o man with h/o P AFib/A flutter, non ischemic cardiomyopathy, 

PFO, HTN, ETOH abuse, Asthma, who presented with a fall  while intoxicated. he 

was found to have C2 Fx and ETOH Withdrawal 





#S/p fall with  B/l C2 Fx on CT scan :  Cont hard collar as per neurosurgeon , 

patient needs to wear it for one month. 


# Acute alcoholic pancreatitis . advance diet as tolerated, low fat diet. 

oxycodone for pain, repeat imaging of pancreas in 4 weeks as outpatient


# H/o chronic Systolic heart failure: stable cont coreg 


# H/o AFib/A flutter. now in sinus. Patient cannot be on Eliquis since patient s

/p C2 fx s/p fall, and drinks alcohol . patient is not compliant with Eliquis 

or coumadin  at this time. Risk > than the benefit, since drinks extensively 

and had a fracture of C2.  


# ETOH WD: completed librium protocol . Cont folate, thiamine , and MVT. 


# HTN: Cont coreg , patient does not want to take Entresto since patient stated 

that it is so expensive an does not have any insurance. will get social service 

involve.  





DVT px: SQ heparin. 


once tolerates diet will discharge the patient home.

## 2019-10-14 VITALS — HEART RATE: 78 BPM | DIASTOLIC BLOOD PRESSURE: 82 MMHG | TEMPERATURE: 98.8 F | SYSTOLIC BLOOD PRESSURE: 127 MMHG

## 2019-10-14 LAB
ALBUMIN SERPL-MCNC: 4 G/DL (ref 3.4–5)
ALP SERPL-CCNC: 87 U/L (ref 45–117)
ALT SERPL-CCNC: 61 U/L (ref 13–61)
ANION GAP SERPL CALC-SCNC: 8 MMOL/L (ref 8–16)
AST SERPL-CCNC: 34 U/L (ref 15–37)
BASOPHILS # BLD: 1.5 % (ref 0–2)
BILIRUB SERPL-MCNC: 0.6 MG/DL (ref 0.2–1)
BUN SERPL-MCNC: 12.7 MG/DL (ref 7–18)
CALCIUM SERPL-MCNC: 9.7 MG/DL (ref 8.5–10.1)
CHLORIDE SERPL-SCNC: 102 MMOL/L (ref 98–107)
CO2 SERPL-SCNC: 28 MMOL/L (ref 21–32)
CREAT SERPL-MCNC: 1 MG/DL (ref 0.55–1.3)
DEPRECATED RDW RBC AUTO: 14.5 % (ref 11.9–15.9)
EOSINOPHIL # BLD: 7.3 % (ref 0–4.5)
GLUCOSE SERPL-MCNC: 98 MG/DL (ref 74–106)
HCT VFR BLD CALC: 41.3 % (ref 35.4–49)
HGB BLD-MCNC: 13.8 GM/DL (ref 11.7–16.9)
LYMPHOCYTES # BLD: 29 % (ref 8–40)
MCH RBC QN AUTO: 31.5 PG (ref 25.7–33.7)
MCHC RBC AUTO-ENTMCNC: 33.5 G/DL (ref 32–35.9)
MCV RBC: 93.8 FL (ref 80–96)
MONOCYTES # BLD AUTO: 15.8 % (ref 3.8–10.2)
NEUTROPHILS # BLD: 46.4 % (ref 42.8–82.8)
PLATELET # BLD AUTO: 268 K/MM3 (ref 134–434)
PMV BLD: 8.3 FL (ref 7.5–11.1)
POTASSIUM SERPLBLD-SCNC: 3.9 MMOL/L (ref 3.5–5.1)
PROT SERPL-MCNC: 7.4 G/DL (ref 6.4–8.2)
RBC # BLD AUTO: 4.4 M/MM3 (ref 4–5.6)
SODIUM SERPL-SCNC: 138 MMOL/L (ref 136–145)
WBC # BLD AUTO: 5.8 K/MM3 (ref 4–10)

## 2019-10-14 RX ADMIN — LIDOCAINE SCH PATCH: 50 PATCH TOPICAL at 09:06

## 2019-10-14 RX ADMIN — BUDESONIDE AND FORMOTEROL FUMARATE DIHYDRATE SCH PUFF: 160; 4.5 AEROSOL RESPIRATORY (INHALATION) at 09:11

## 2019-10-14 RX ADMIN — IPRATROPIUM BROMIDE SCH AMP: 0.5 SOLUTION RESPIRATORY (INHALATION) at 11:40

## 2019-10-14 RX ADMIN — IPRATROPIUM BROMIDE SCH AMP: 0.5 SOLUTION RESPIRATORY (INHALATION) at 07:10

## 2019-10-14 RX ADMIN — CARVEDILOL SCH MG: 12.5 TABLET, FILM COATED ORAL at 09:06

## 2019-10-14 RX ADMIN — HEPARIN SODIUM SCH UNIT: 5000 INJECTION, SOLUTION INTRAVENOUS; SUBCUTANEOUS at 05:15

## 2019-10-14 RX ADMIN — MULTIVITAMIN TABLET SCH TAB: TABLET at 09:06

## 2019-10-14 RX ADMIN — FOLIC ACID SCH MG: 1 TABLET ORAL at 09:06

## 2019-10-14 RX ADMIN — Medication SCH MG: at 09:06

## 2019-10-14 NOTE — DS
Physical Exam: 


SUBJECTIVE: Patient seen and examined at the bedside, no acute events 

overnight. Patient reports he is feeling well. 








OBJECTIVE:





 Vital Signs











 Period  Temp  Pulse  Resp  BP Sys/Tobias  Pulse Ox


 


 Last 24 Hr  97.6 F-98.8 F  62-78  16-20  115-135/67-89  








PHYSICAL EXAM





GENERAL: The patient is awake, alert, and fully oriented, in no acute distress.


HEAD: Normal with no signs of trauma.


EYES: PERRL, extraocular movements intact, sclera anicteric, conjunctiva clear. 

No ptosis. 


ENT: Ears normal, nares patent, oropharynx clear without exudates, moist mucous 

membranes.


NECK: C-collar in place


LUNGS: Breath sounds equal, clear to auscultation bilaterally, no wheezes, some 

faint scattered wheezes in the inferior lung fields, no 


accessory muscle use. 


HEART: Regular rate and rhythm, S1, S2 without murmur, rub or gallop, no JVD


ABDOMEN: Soft, obese, non- tender to palpation, nondistended, normoactive bowel 

sounds


EXTREMITIES: 2+ pulses, warm, well-perfused, no edema, 


NEUROLOGICAL: Cranial nerves II through XII grossly intact. Normal speech, gait 

not observed.


PSYCH: Normal mood, normal affect.


SKIN: Warm, dry, normal turgor, no rashes or lesions noted





LABS


 Laboratory Results - last 24 hr











  10/14/19 10/14/19





  07:45 07:45


 


WBC  5.8 


 


RBC  4.40 


 


Hgb  13.8 


 


Hct  41.3 


 


MCV  93.8 


 


MCH  31.5 


 


MCHC  33.5 


 


RDW  14.5 


 


Plt Count  268  D 


 


MPV  8.3 


 


Absolute Neuts (auto)  2.7 


 


Neutrophils %  46.4 


 


Lymphocytes %  29.0  D 


 


Monocytes %  15.8 H 


 


Eosinophils %  7.3 H 


 


Basophils %  1.5 


 


Nucleated RBC %  0 


 


Sodium   138


 


Potassium   3.9


 


Chloride   102


 


Carbon Dioxide   28


 


Anion Gap   8


 


BUN   12.7


 


Creatinine   1.0


 


Est GFR (CKD-EPI)AfAm   97.08


 


Est GFR (CKD-EPI)NonAf   83.76


 


Random Glucose   98


 


Calcium   9.7


 


Total Bilirubin   0.6


 


AST   34


 


ALT   61


 


Alkaline Phosphatase   87


 


Total Protein   7.4


 


Albumin   4.0








Imaging: 





CT Cervical spine w/o con: Obliquely oriented fracture at the junction of the 

lateral mass of C2, body of C2 with mild step- off, cortical disruption. Subtle 

nondisplaced fracture is seen in the similar location on contralateral side. 

Fractures of indeterminate age. 





MRI cervical spine: C2 fractures appear to be chronic in nature without bone 

marrow edema. Normal signal intensity of the spinal cord. No evidence of acute 

compression deformities, or bone marrow edema. 





head c/t non-con: No evidence of acute intracranial hemorrhage, edema, midline 

shift, mass effect, or skull fracture. There is no CT evidence of acute 

territorial infarction. 





RUQ US: Hepatomegaly with mild fatty infiltration versus hepatocellular 

disease. Please correlate with liver enzymes. Over distended gallbladder 

without intraluminal stones, wall thickening or pericholecystic free fluid. 

Correlate clinically for further evaluation and follow-up. 





Abd CT: There appears to be minimal to mild peripancreatic soft tissue 

stranding in the region of the tail - ? acute pancreatitis. No pseudocyst is 

identified. Direct comparison with prior CT/MRI studies is suggested if 

available from a different facility. Borderline splenic size. 





HOSPITAL COURSE:





Date of Admission:10/04/19





ASSESSMENT/PLAN:


Mr. Charles is a 54 year old man with a past medical history of afib (not on 

eliquis), HTN, CHF (EF35%), asthma, and alcohol abuse who presents to the ED s/

p 2 falls in his bedroom while drunk. Patient was found to have bilateral C2fx 

on imaging. Evaluated by neurosurgery who determined there was no surgical 

intervention at this time but that the patient needed to wear a hard c-collar 

for 4 weeks until he could follow up in Dr. Escudero's office in 4 weeks. While 

hospitalized the patient also developed alcohol induced pancreatitis. There was 

no evidence of galstones, the patient was continued on IVF and kept NPO until 

pain resolved. Dr. Wolf was consulted and recommended outpatient imaging of 

pancreas in 4 weeks. After 10 days of hospitalization the patient's clinical 

condition improved and he was discharged home with instructions to follow up as 

an outpatient with his PCP, neurosurgeon, and GI. Patient d/c'd on tylenol for 

pain, PO thiamine daily, and a multivitamin. Additionally, patient reported he 

does not have refills on his home meds because he is uninsured and unable to 

follow up with his doctors. Plan to have pt follow up at [facility]. 

patient does not wish to resume eliquis due to the cost. Further, will not d/c 

the patient on AC now as he is a fall risk with recent history of falls 

resulting in fractures. The patient is very high risk of bleed and so we will d/

c him on ASA 81 until he is no longer consuming alcohol regularly. Continued 

patient on his home coreg. 





medication changes (patient instructions):


Please STOP taking Eliquis. Please discuss with your PCP when to resume as you 

are increased risk for bleeding due to your drinking and frequent falls. Please 

know that because you are not taking this medication you are at increased risk 

of making blood clots. We have determined that the risk of you falling and 

bleeding however is greater than the risk of clots. Please discuss Detox and 

rehabilitation with your PCP so that you can resume anticoagulation   





Please STOP taking Entresto.





Please resume your other home medications as prescribed.





We have started you on some new medications


Please take Carvedilol (Coreg) 12.5mg TWICE a Day


Please take Valsartan (Diovan) 40mg bid a Day


Thiamine 100mg ONCE a Day


Folic Acid 1mg ONCE a Day











Date of Discharge: 10/14/19











Minutes to complete discharge: 40





Discharge Summary


Problems reviewed: Yes


Reason For Visit: Alcohol intoxication, Fracture of second cervical


Condition: Improved





- Instructions


Diet, Activity, Other Instructions: 


You came in for a fall while you were intoxicated with alcohol . We imaged your 

head and it showed nothing of immediately wrong. We imaged your neck and found 

that you have a C2 spine fracture. Neurosurgeon Dr. Faulkner evaluated you and 

you were found not to need surgery. Neurosurgry placed you on a Neck Collar 

that you need to wear to at least 4-6 weeks. We also treated you for alcohol 

withdrawal. Please STOP drinking alcohol. We imaged your abdomen and you were 

found to have acute inflammation of your pancreas. We treated you with a short 

course of antibiotics, fluids and diet restriction. Please follow up in 4 weeks 

for a repeat imaging of your pancreas.





Outpatient repeat imaging of pancreas if continued improvement, in 4 weeks.





Please STOP taking Eliquis. Please discuss with your PCP when to resume as you 

are increased risk for bleeding due to your drinking and frequent falls. Please 

know that because you are not taking this medication you are at increased risk 

of making blood clots. We have determined that the risk of you falling and 

bleeding however is greater than the risk of clots. Please discuss Detox and 

rehabilitation with your PCP so that you can resume anticoagulation   





Please STOP taking Entresto.





Please resume your other home medications as prescribed.





We have started you on some new medications


Please take Carvedilol (Coreg) 12.5mg TWICE a Day


Please take Valsartan (Diovan) 40mg bid a Day


Thiamine 100mg ONCE a Day


Folic Acid 1mg ONCE a Day





Please follow up at [facility] to receive your prescription 

medications.





Please wear your hard collar for 4-6 weeks  





Please follow up with your Neurosurgeon, Dr. Spencer, within 1 month. 


Please follow up with your Primary Care Physician within 1 week. If you do not 

have one please make an appointment with Dr. Delatorre


Please follow up with your Gastroenterologist, Dr. Wolf, within 1 week. 

Please discuss the re-imaging of your pancreas at this time.


Please follow up with Dr. Cantu within 1 week to discuss Drug and Alcohol 

Rehabilitation





Please return to the ED if you are experiencing numbness/tingling, weakness in 

your extremities, chest pain, abdominal pain that wont go away, difficulty 

breathing or any concerning symptoms. 








Referrals: 


Hans Delatorre MD [Staff Physician] - 1 Week


Neel Spencer MD, FAANS [Staff Physician] - 1 Week


Aries Wolf DO [Staff Physician] - 1 Week


Sam Ricardo MD [Staff Physician] - 1 Week


Steph Cantu DO [Staff Physician] - 1 Week


Disposition: HOME





- Home Medications


Comprehensive Discharge Medication List: 


Ambulatory Orders





Albuterol Sulfate Inhaler - [Ventolin HFA Inhaler -] 2 puff IH Q6H PRN  inhaler 

10/14/19 


Aspirin Coated [Ecotrin -] 81 mg PO DAILY  tablet.ec 10/14/19 


Budesonide/Formeterol Fumarate [SYMBICORT 160/4.5mcg -] 1 puff IH DAILY  

inhaler 10/14/19 


Carvedilol [Coreg -] 12.5 mg PO BID #60 tablet 10/14/19 


Docusate Sodium [Colace -] 200 mg PO HS #20 capsule 10/14/19 


Ipratropium 0.02% Nebulizer [Atrovent 0.02% Nebulizer -] 1 amp NEB RQID #25 amp 

10/14/19 


Multivitamins [Multivit ([facility] Formul[facility])] 1 tab PO DAILY  tab 10/14/19 


Thiamine HCl [Vitamin B1 -] 100 mg PO DAILY  tablet 10/14/19 


Valsartan [Diovan] 40 mg PO BID #60 tablet 10/14/19 








This patient is new to me today: No


Emergency Visit: Yes


ED Registration Date: 10/04/19


Care time: The patient presented to the Emergency Department on the above date 

and was hospitalized for further evaluation of their emergent condition.


Critical Care patient: No





- Discharge Referral


Referred to [facility] No





ATTENDING PHYSICIAN STATEMENT





I saw and evaluated the patient.


I reviewed the resident's note and discussed the case with the resident.


I agree with the resident's findings and plan as documented.








SUBJECTIVE:








OBJECTIVE:








ASSESSMENT AND PLAN:

## 2019-10-14 NOTE — PN
Teaching Attending Note


Name of Resident: Dari Gibson





ATTENDING PHYSICIAN STATEMENT





I saw and evaluated the patient.


I reviewed the resident's note and discussed the case with the resident.


I agree with the resident's findings and plan as documented.








SUBJECTIVE:


Patient is comfortable with no acute distress, tolerating diet well. 


OBJECTIVE:


 Vital Signs











Temperature  98.8 F   10/14/19 08:54


 


Pulse Rate  78   10/14/19 08:54


 


Respiratory Rate  16   10/14/19 08:54


 


Blood Pressure  127/82   10/14/19 08:54


 


O2 Sat by Pulse Oximetry (%)  96   10/08/19 09:00








GENERAL: The patient is awake, alert, and fully oriented, in no acute distress.


HEAD: Normal with no signs of trauma. in a hard neck Collar. 


EYES: PERRL, extraocular movements intact, sclera anicteric, conjunctiva clear. 


ENT: Ears normal,  oropharynx clear without exudates, moist mucous membranes.


NECK: Trachea midline, full range of motion, supple. 


LUNGS: Breath sounds equal, clear to auscultation bilaterally, no wheezes, no 

crackles, no accessory muscle use. 


HEART: Regular rate and rhythm, S1, S2 without murmur, rub or gallop.


ABDOMEN: Soft, no further abdominal tenderness ,  ND, +BS,  no guarding, no 

rebound, no hepatosplenomegaly, no masses.


EXTREMITIES: 2+ pulses, warm, well-perfused, no edema. 


NEUROLOGICAL: Cranial nerves II through XII grossly intact. Normal speech, gait 

is stable .


PSYCH: Normal mood, normal affect.


SKIN: Warm, dry, normal turgor, no rashes or lesions noted           CBCD











WBC  5.8 K/mm3 (4.0-10.0)   10/14/19  07:45    


 


RBC  4.40 M/mm3 (4.00-5.60)   10/14/19  07:45    


 


Hgb  13.8 GM/dL (11.7-16.9)   10/14/19  07:45    


 


Hct  41.3 % (35.4-49)   10/14/19  07:45    


 


MCV  93.8 fl (80-96)   10/14/19  07:45    


 


MCHC  33.5 g/dl (32.0-35.9)   10/14/19  07:45    


 


RDW  14.5 % (11.9-15.9)   10/14/19  07:45    


 


Plt Count  268 K/MM3 (134-434)  D 10/14/19  07:45    


 


MPV  8.3 fl (7.5-11.1)   10/14/19  07:45    








 CMP











Sodium  138 mmol/L (136-145)   10/14/19  07:45    


 


Potassium  3.9 mmol/L (3.5-5.1)   10/14/19  07:45    


 


Chloride  102 mmol/L ()   10/14/19  07:45    


 


Carbon Dioxide  28 mmol/L (21-32)   10/14/19  07:45    


 


Anion Gap  8 MMOL/L (8-16)   10/14/19  07:45    


 


BUN  12.7 mg/dL (7-18)   10/14/19  07:45    


 


Creatinine  1.0 mg/dL (0.55-1.3)   10/14/19  07:45    


 


Random Glucose  98 mg/dL ()   10/14/19  07:45    


 


Calcium  9.7 mg/dL (8.5-10.1)   10/14/19  07:45    


 


Total Bilirubin  0.6 mg/dL (0.2-1)   10/14/19  07:45    


 


AST  34 U/L (15-37)   10/14/19  07:45    


 


ALT  61 U/L (13-61)   10/14/19  07:45    


 


Alkaline Phosphatase  87 U/L ()   10/14/19  07:45    


 


Total Protein  7.4 g/dl (6.4-8.2)   10/14/19  07:45    


 


Albumin  4.0 g/dl (3.4-5.0)   10/14/19  07:45    








 CARDIAC ENZYMES











Creatine Kinase  134 U/L ()   10/04/19  12:30    


 


Troponin I  < 0.02 ng/ml (0.00-0.05)   10/04/19  12:30    








 Current Medications











Generic Name Dose Route Start Last Admin





  Trade Name Freq  PRN Reason Stop Dose Admin


 


Acetaminophen  650 mg  10/06/19 10:35  10/10/19 01:53





  Tylenol -  PO   650 mg





  Q6H PRN   Administration





  PAIN LEVEL 1-5   





     





     





     


 


Albuterol Sulfate  2 puff  10/05/19 08:04  10/13/19 09:33





  Ventolin Hfa Inhaler -  IH   2 puff





  Q6H PRN   Administration





  SHORT OF BREATH/WHEEZING   





     





     





     


 


Aspirin  81 mg  10/14/19 10:00  





  Ecotrin -  PO   





  DAILY RAEGAN   





     





     





     





     


 


Budesonide/Formoterol Fumarate  1 puff  10/05/19 10:00  10/13/19 11:17





  Symbicort 160/4.5mcg -  IH   1 puff





  DAILY RAEGAN   Administration





     





     





     





     


 


Carvedilol  12.5 mg  10/05/19 10:00  10/13/19 21:04





  Coreg -  PO   12.5 mg





  BID RAEGAN   Administration





     





     





     





     


 


Folic Acid  1 mg  10/05/19 10:00  10/13/19 09:32





  Folic Acid -  PO   1 mg





  DAILY RAEGAN   Administration





     





     





     





     


 


Heparin Sodium (Porcine)  5,000 unit  10/04/19 22:00  10/14/19 05:15





  Heparin -  SQ   5,000 unit





  TID RAEGAN   Administration





     





     





     





     


 


Ipratropium Bromide  1 amp  10/05/19 12:00  10/14/19 07:10





  Atrovent 0.02% Nebulizer -  NEB   1 amp





  RQID RAEGAN   Administration





     





     





     





     


 


Lidocaine  1 patch  10/04/19 17:45  10/13/19 09:30





  Lidoderm Patch -  TP   1 patch





  DAILY RAEGAN   Administration





     





     





     





     


 


Miscellaneous  1 each  10/04/19 22:00  10/13/19 21:04





  Lidoderm Patch Removal  MC   1 each





  DAILY@2200 RAEGAN   Administration





     





     





     





     


 


Multivitamins/Minerals/Vitamin C  1 tab  10/05/19 10:00  10/13/19 09:31





  Tab-A-Vit -  PO   1 tab





  DAILY RAEGAN   Administration





     





     





     





     


 


Ondansetron HCl  4 mg  10/10/19 16:36  10/11/19 23:28





  Zofran Injection  IVPB   4 mg





  Q8H PRN   Administration





  NAUSEA AND/OR VOMITING   





     





     





     


 


Thiamine HCl  100 mg  10/05/19 10:00  10/13/19 09:32





  Vitamin B1 -  PO   100 mg





  DAILY RAEGAN   Administration





     





     





     





     


 


Valsartan  40 mg  10/14/19 10:00  





  Diovan -  PO   





  BID [facility]   





     





     





     





     








 Home Medications











 Medication  Instructions  Recorded


 


Clonazepam [Klonopin] 0.5 mg PO DAILY 10/05/19


 


Escitalopram Oxalate [Lexapro -] 20 mg PO DAILY 10/05/19


 


Gabapentin [Neurontin] 300 mg PO DAILY 10/05/19














ASSESSMENT AND PLAN:


Patient is a 53 y/o man with h/o P AFib/A flutter, non ischemic cardiomyopathy, 

PFO, HTN, ETOH abuse, Asthma, who presented with a fall  while intoxicated. he 

was found to have C2 Fx and ETOH Withdrawal 





#S/p fall with  B/l C2 Fx on CT scan :  Cont hard collar as per neurosurgeon , 

patient needs to wear the hard collar for one month. 


# Acute alcoholic pancreatitis . advance diet as tolerated, low fat diet. 

oxycodone for pain, repeat imaging of pancreas in 4 weeks as outpatient


# H/o chronic Systolic heart failure: stable cont coreg 


# H/o AFib/A flutter. now in sinus. Patient cannot be on Eliquis or any other 

AC since patient is s/p C2 fx s/p fall and presented with elevated alcohol 

level. patient is not compliant with Eliquis or coumadin  at this time. Risk is 

worse > than the benefit, since drinks extensively and had a fracture of C2.  


# ETOH WD: completed librium protocol . Cont folate, thiamine , and MVT. 


# HTN: Cont coreg , patient does not want to take Entresto since patient stated 

that it is so expensive an does not have any insurance. will get social service 

involve.  





DVT px: SQ heparin. 


patient tolerated diet will discharge the patient home.


will discharge on ASA 81mg EC


Valsartan 40mg po bid in place of Entresto


coreg 12.5mg po bid


no eliquis/coumadin sine patient has a fall with fx and presented with alcohol 

level of 372.

## 2019-10-25 ENCOUNTER — HOSPITAL ENCOUNTER (EMERGENCY)
Dept: HOSPITAL 74 - JER | Age: 54
Discharge: HOME | End: 2019-10-25
Payer: COMMERCIAL

## 2019-10-25 VITALS — HEART RATE: 89 BPM | DIASTOLIC BLOOD PRESSURE: 78 MMHG | TEMPERATURE: 98.5 F | SYSTOLIC BLOOD PRESSURE: 136 MMHG

## 2019-10-25 VITALS — BODY MASS INDEX: 29.2 KG/M2

## 2019-10-25 DIAGNOSIS — I11.0: ICD-10-CM

## 2019-10-25 DIAGNOSIS — Z86.79: ICD-10-CM

## 2019-10-25 DIAGNOSIS — J45.909: ICD-10-CM

## 2019-10-25 DIAGNOSIS — R10.13: ICD-10-CM

## 2019-10-25 DIAGNOSIS — I50.9: ICD-10-CM

## 2019-10-25 DIAGNOSIS — Z87.891: ICD-10-CM

## 2019-10-25 DIAGNOSIS — K86.9: ICD-10-CM

## 2019-10-25 DIAGNOSIS — F10.10: ICD-10-CM

## 2019-10-25 DIAGNOSIS — R07.89: Primary | ICD-10-CM

## 2019-10-25 LAB
ALBUMIN SERPL-MCNC: 4.1 G/DL (ref 3.4–5)
ALP SERPL-CCNC: 67 U/L (ref 45–117)
ALT SERPL-CCNC: 40 U/L (ref 13–61)
ANION GAP SERPL CALC-SCNC: 11 MMOL/L (ref 8–16)
AST SERPL-CCNC: 25 U/L (ref 15–37)
BASOPHILS # BLD: 1.5 % (ref 0–2)
BILIRUB SERPL-MCNC: 0.3 MG/DL (ref 0.2–1)
BUN SERPL-MCNC: 5.3 MG/DL (ref 7–18)
CALCIUM SERPL-MCNC: 9.2 MG/DL (ref 8.5–10.1)
CHLORIDE SERPL-SCNC: 95 MMOL/L (ref 98–107)
CO2 SERPL-SCNC: 25 MMOL/L (ref 21–32)
CREAT SERPL-MCNC: 0.8 MG/DL (ref 0.55–1.3)
DEPRECATED RDW RBC AUTO: 13.7 % (ref 11.9–15.9)
EOSINOPHIL # BLD: 4.2 % (ref 0–4.5)
GLUCOSE SERPL-MCNC: 117 MG/DL (ref 74–106)
HCT VFR BLD CALC: 45 % (ref 35.4–49)
HGB BLD-MCNC: 15.1 GM/DL (ref 11.7–16.9)
LIPASE SERPL-CCNC: 271 U/L (ref 73–393)
LYMPHOCYTES # BLD: 21.4 % (ref 8–40)
MAGNESIUM SERPL-MCNC: 2 MG/DL (ref 1.8–2.4)
MCH RBC QN AUTO: 30.9 PG (ref 25.7–33.7)
MCHC RBC AUTO-ENTMCNC: 33.4 G/DL (ref 32–35.9)
MCV RBC: 92.4 FL (ref 80–96)
MONOCYTES # BLD AUTO: 11.3 % (ref 3.8–10.2)
NEUTROPHILS # BLD: 61.6 % (ref 42.8–82.8)
PHOSPHATE SERPL-MCNC: 3.4 MG/DL (ref 2.5–4.9)
PLATELET # BLD AUTO: 382 K/MM3 (ref 134–434)
PMV BLD: 8.2 FL (ref 7.5–11.1)
POTASSIUM SERPLBLD-SCNC: 4.6 MMOL/L (ref 3.5–5.1)
PROT SERPL-MCNC: 7.5 G/DL (ref 6.4–8.2)
RBC # BLD AUTO: 4.87 M/MM3 (ref 4–5.6)
SODIUM SERPL-SCNC: 132 MMOL/L (ref 136–145)
WBC # BLD AUTO: 6.6 K/MM3 (ref 4–10)

## 2019-10-25 PROCEDURE — 3E033GC INTRODUCTION OF OTHER THERAPEUTIC SUBSTANCE INTO PERIPHERAL VEIN, PERCUTANEOUS APPROACH: ICD-10-PCS | Performed by: EMERGENCY MEDICINE

## 2019-10-25 NOTE — PDOC
Rapid Medical Evaluation


Chief Complaint: Chest Pain


Time Seen by Provider: 10/25/19 13:21


Medical Evaluation: 


 Allergies











Allergy/AdvReac Type Severity Reaction Status Date / Time


 


No Known Allergies Allergy   Verified 10/13/14 10:16











10/25/19 13:21


I have performed a brief in-person evaluation of this patient.





The patient presents with a chief complaint of: 


H/o Afib, CHF, asthma, alcohol abuse, c/o shortness of breath and chest 

tightness since midnight last night. Pt used neb which did not help.


(+)h/o pancreatitis but CP does not radiate to the back (feels like tightness)





I have ordered the following: EKG, CXR, labs, cardiac enzymes, lipase





The patient will proceed to the ED for further evaluation.


10/25/19 13:25








**Discharge Disposition





- Diagnosis


 Chest tightness








- Referrals





- Patient Instructions





- Post Discharge Activity

## 2019-10-25 NOTE — PDOC
History of Present Illness





- General


Chief Complaint: Chest Pain


Stated Complaint: CHEST PAIN, DIFF BREATHING


Time Seen by Provider: 10/25/19 13:21


History Source: Patient


Exam Limitations: No Limitations





- History of Present Illness


Initial Comments: 





HPI: 


53 y/o male presenting to [facility] ER complaining of epigastric pain, chest 

tightness, and upper back pain. Symptoms started this morning after drinking 

two beers. Further reports he drank >10 beers last evening after three weeks of 

sobriety. He was seen at this facility after sustaining a C2 fracture while 

intoxicated. Developed pancreatitis during course of admission. Expressed 

anxiety about his relapse and questioned if the symptoms are related to EtOH 

withdrawal. He attempted relief with his home inhaler without any relief. 





Medical Hx: 


- HTN


- CHF


- Asthma


- EtOH Abuse


- Former smoker


- H/o pancreatitis


- H/o of A-fib w/ cardioversion





Review of Systems: 


In addition to that documented in the HPI above, the additional ROS was obtained

:


Constitutional- Denies fevers or chills


Head- Denies vision changes


ENMT- Denies sore throat


CV- Denies palpitations


Resp- Per HPI. Denies coughing.


GI- Endorses nausea but denies vomiting


- Denies painful urination


MSK- Denies recent trauma (since last admission)


Skin- Denies new rashes


Neuro- Denies new numbness or tingling or weakness


Endocrine- Denies polyuria


Heme- Denies bleeding or bruising





Physical Examination: 


Constitutional- Well-developed, well-nourished adult male in no acute distress 

or obvious discomfort. Found semi-fowlers on hospital bed.    





Head- Normocephalic. No obvious external signs of trauma. 





Cardiovascular / Chest- Regular rate and regular rhythm. No murmur, rubs, clicks

, or gallops. Peripheral pulses- radial pulses full.    





Respiratory- Breathing unlabored. Equal chest rise and fall. Clear to 

auscultation bilaterally. No stridor, no wheezing, no rhonchi.  





Gastrointestinal- abdomen is mildly tender in epigastric region without grimace

, rebound, or guarding. Globally, abdomen is soft and nondistended. 





Neuro- Alert and oriented x4. Moving all four extremities spontaneously.





Skin- Warm, dry, and intact. 





Psych- Affect- appropriate.  Mood- normal. Speech was non-labored, non-

pressured.





MDM: 


*Reviewed vital signs, nursing notes, and prior visit documentation (if 

available).





53 y/o male presenting with epigastric pain, chest tightness, and anxiety which 

started after EtOH abuse relapse. Afebrile. Vitals unremarkable for hypotension 

or tachycardia. Physical exam as described above. No acute abdominal signs. CXR 

unremarkable for acute findings. EKG unremarkable for ischemic findings. 

Troponin not elevated. Lipase normal. Low suspicion for chronic pancreatitis, 

rather suspect likely alcoholic gastritis. Pt reports symptoms improved with 

Pepcid and Maalox. 





Discussed labs and xrays results with pt . Answered all questions. Provided 

return precautions. Pt  expressed verbal understanding and agreement with plan 

to discharge home with outpatient follow up. Provided copies of todays 

results. Encouraged further EtOH abstinence with assistance.  





Christopher Merchant M.D., PGY2


Emergency Medicine Resident











Past History





- Past Medical History


Allergies/Adverse Reactions: 


 Allergies











Allergy/AdvReac Type Severity Reaction Status Date / Time


 


No Known Allergies Allergy   Verified 10/25/19 13:25











Home Medications: 


Ambulatory Orders





Albuterol Sulfate Inhaler - [Ventolin HFA Inhaler -] 2 puff IH Q6H PRN  inhaler 

10/14/19 


Aspirin Coated [Ecotrin -] 81 mg PO DAILY  tablet.ec 10/14/19 


Budesonide/Formeterol Fumarate [SYMBICORT 160/4.5mcg -] 1 puff IH DAILY  

inhaler 10/14/19 


Carvedilol [Coreg -] 12.5 mg PO BID #60 tablet 10/14/19 


Docusate Sodium [Colace -] 200 mg PO HS #20 capsule 10/14/19 


Ipratropium 0.02% Nebulizer [Atrovent 0.02% Nebulizer -] 1 amp NEB RQID #25 amp 

10/14/19 


Multivitamins [Multivit ([facility] Formulary)] 1 tab PO DAILY  tab 10/14/19 


Thiamine HCl [Vitamin B1 -] 100 mg PO DAILY  tablet 10/14/19 


Valsartan [Diovan] 40 mg PO BID #60 tablet 10/14/19 








Anemia: No


Asthma: Yes


Cancer: No


Cardiac Disorders: Yes (TAA.  1 EPISODE ASTHMA RELATED A-FIB 5 YRS AGO)


CVA: No


COPD: No


CHF: Yes


Dementia: No


Diabetes: No


GI Disorders: No


 Disorders: No


HTN: Yes


Hypercholesterolemia: No


Liver Disease: No


Seizures: No


Thyroid Disease: No


Other medical history: C2 fracture





- Surgical History


Abdominal Surgery: No


Appendectomy: No


Cardiac Surgery: No


Cholecystectomy: No


Lung Surgery: No


Neurologic Surgery: No


Orthopedic Surgery: No





- Psycho Social/Smoking Cessation Hx


Smoking History: Never smoked


Have you smoked in the past 12 months: No


Number of Cigarettes Smoked Daily: 3


Information on smoking cessation initiated: No


'Breaking Loose' booklet given: 10/13/14


Hx Alcohol Use: Yes


Drug/Substance Use Hx: No


Substance Use Type: Alcohol


Hx Substance Use Treatment: No





*Physical Exam





- Vital Signs


 Last Vital Signs











Temp Pulse Resp BP Pulse Ox


 


 97.4 F L  97 H  20   135/94   97 


 


 10/25/19 15:58  10/25/19 15:58  10/25/19 13:21  10/25/19 15:58  10/25/19 15:58














ED Treatment Course





- LABORATORY


CBC & Chemistry Diagram: 


 10/25/19 14:12





 10/25/19 14:12





- ADDITIONAL ORDERS


Additional order review: 


 Laboratory  Results











  10/25/19 10/25/19 10/25/19





  14:12 14:12 14:12


 


Sodium    132 L


 


Potassium    4.6


 


Chloride    95 L


 


Carbon Dioxide    25


 


Anion Gap    11


 


BUN    5.3 L


 


Creatinine    0.8


 


Est GFR (CKD-EPI)AfAm    115.74


 


Est GFR (CKD-EPI)NonAf    99.86


 


Random Glucose    117 H


 


Calcium    9.2


 


Phosphorus    3.4


 


Magnesium   2.0 


 


Total Bilirubin    0.3


 


AST    25


 


ALT    40


 


Alkaline Phosphatase    67


 


Creatine Kinase    94


 


Troponin I    < 0.02


 


B-Natriuretic Peptide  54.9  


 


Total Protein    7.5


 


Albumin    4.1


 


Lipase   271 








 











  10/25/19





  14:12


 


RBC  4.87


 


MCV  92.4


 


MCHC  33.4


 


RDW  13.7


 


MPV  8.2


 


Neutrophils %  61.6  D


 


Lymphocytes %  21.4  D


 


Monocytes %  11.3 H


 


Eosinophils %  4.2


 


Basophils %  1.5














- RADIOLOGY


Radiology Studies Ordered: 














 Category Date Time Status


 


 CHEST PA & LAT [RAD] Stat Radiology  10/25/19 14:27 Ordered














- Medications


Given in the ED: 


ED Medications














Discontinued Medications














Generic Name Dose Route Start Last Admin





  Trade Name Freq  PRN Reason Stop Dose Admin


 


Al Hydroxide/Mg Hydroxide  30 ml  10/25/19 14:45  10/25/19 15:19





  Mylanta Suspension -  PO  10/25/19 14:46  30 ml





  ONCE ONE   Administration





     





     





     





     


 


Famotidine  20 mg  10/25/19 14:44  10/25/19 15:19





  Pepcid -  PO  10/25/19 14:45  Not Given





  ONCE ONE   





     





     





     





     


 


Famotidine/Sodium Chloride  20 mg in 50 mls @ 100 mls/hr  10/25/19 15:06  10/25/

19 15:19





  Pepcid 20 Mg Premixed Ivpb -  IVPB  10/25/19 15:35  100 mls/hr





  ONCE ONE   Administration





     





     





     





     














Discharge





- Discharge Information


Problems reviewed: Yes


Clinical Impression/Diagnosis: 


 Chest tightness, Epigastric pain





Condition: Good


Disposition: HOME





- Admission


No





- Follow up/Referral


Referrals: 


Eugene Brennan DO [Primary Care Provider] - 





- Patient Discharge Instructions


Patient Printed Discharge Instructions:  DI for Abdominal Pain-Adult, DI for 

Atypical Chest Pain


Additional Instructions: 


You were seen today for chest tightness and upper abdominal pain since drinking 

this morning. Your EKG, blood work, and chest xray were normal today. Your 

symptoms may be related to anxiety. It could also be related to stomach 

irritation from the alcohol. 





You can try taking over the counter Zantac or Prilosec. Take as directed on the 

package insert. Do not take more than the recommended dose. 





Avoid taking NSAIDS, such as Advil, Motrin, or Ibuprofen. These may further 

irritate your stomach. 





Follow up with your primary care doctor within the next 2-3 days. You will need 

to call to make an appointment. The number is included in this packet. A copy 

of todays results are attached to this packet. Take it to the appointment so 

your doctor can review them.





You need to stop drinking! You should consider talking to your primary care 

doctor about strategies to help. 





Go to the nearest emergency department if your condition worsens or you feel 

like you need additional emergency evaluation. 





Print Language: ENGLISH





- Post Discharge Activity

## 2019-10-25 NOTE — PDOC
Attending Attestation





- Resident


Resident Name: BishopChristopher





- HPI


HPI: 





10/25/19 17:49


Pt presents to the ED complaining of >12 hours of chest pressure and nausea 

after recent ETOH use.  Patient has a history of chronic ETOH use, chronic 

pancreatitis, CHF, HTN.  Pain relieved with pepcid.  Denies shortness of breath

, vomiting or diaphoresis.  Denies abdominal pain. 


10/25/19 18:03








- Physicial Exam


PE: 





10/25/19 18:02


Agree with resident exam.  Patient is alert and oriented x 3 and in no acute 

distress.  CV: rrr no m/r/g.  Pulm: cta b/l.  abdomen: soft, non tender, non 

distended without guarding or rebound.  ext: no edema.





- Medical Decision Making





10/25/19 18:03


Pt presents to the ED complaining of atypical chest pain that resolved with 

pepcid.  EKG shows no evidence of ischemia.  LAbs including troponin are 

negative. Heart score is 3.  Will discharge home with instructions to follow up 

with his PCP as an outpatient. 


10/25/19 18:03

## 2019-10-26 ENCOUNTER — HOSPITAL ENCOUNTER (INPATIENT)
Dept: HOSPITAL 74 - YASAS | Age: 54
LOS: 4 days | Discharge: HOME | End: 2019-10-30
Attending: ALLERGY & IMMUNOLOGY | Admitting: ALLERGY & IMMUNOLOGY
Payer: COMMERCIAL

## 2019-10-26 VITALS — BODY MASS INDEX: 29.7 KG/M2

## 2019-10-26 DIAGNOSIS — I48.3: ICD-10-CM

## 2019-10-26 DIAGNOSIS — F10.230: Primary | ICD-10-CM

## 2019-10-26 DIAGNOSIS — Z87.19: ICD-10-CM

## 2019-10-26 DIAGNOSIS — I50.9: ICD-10-CM

## 2019-10-26 DIAGNOSIS — Q21.1: ICD-10-CM

## 2019-10-26 DIAGNOSIS — R00.0: ICD-10-CM

## 2019-10-26 DIAGNOSIS — M54.5: ICD-10-CM

## 2019-10-26 DIAGNOSIS — G89.29: ICD-10-CM

## 2019-10-26 DIAGNOSIS — Z87.891: ICD-10-CM

## 2019-10-26 DIAGNOSIS — I48.0: ICD-10-CM

## 2019-10-26 DIAGNOSIS — I11.0: ICD-10-CM

## 2019-10-26 DIAGNOSIS — J45.20: ICD-10-CM

## 2019-10-26 PROCEDURE — HZ2ZZZZ DETOXIFICATION SERVICES FOR SUBSTANCE ABUSE TREATMENT: ICD-10-PCS | Performed by: ALLERGY & IMMUNOLOGY

## 2019-10-26 RX ADMIN — CARVEDILOL SCH MG: 12.5 TABLET, FILM COATED ORAL at 22:01

## 2019-10-26 RX ADMIN — ALBUTEROL SULFATE PRN PUFF: 90 AEROSOL, METERED RESPIRATORY (INHALATION) at 22:04

## 2019-10-26 RX ADMIN — ASPIRIN SCH MG: 81 TABLET, COATED ORAL at 22:01

## 2019-10-26 RX ADMIN — ACETAMINOPHEN PRN MG: 325 TABLET ORAL at 22:15

## 2019-10-26 RX ADMIN — Medication SCH MG: at 22:04

## 2019-10-26 RX ADMIN — FAMOTIDINE SCH MG: 20 TABLET ORAL at 22:02

## 2019-10-26 RX ADMIN — ALUMINUM HYDROXIDE, MAGNESIUM HYDROXIDE, AND SIMETHICONE PRN ML: 200; 200; 20 SUSPENSION ORAL at 23:02

## 2019-10-26 RX ADMIN — Medication PRN MG: at 23:50

## 2019-10-26 RX ADMIN — DOCUSATE SODIUM SCH MG: 100 CAPSULE, LIQUID FILLED ORAL at 22:01

## 2019-10-26 RX ADMIN — VALSARTAN SCH MG: 40 TABLET, FILM COATED ORAL at 23:02

## 2019-10-26 RX ADMIN — BUDESONIDE AND FORMOTEROL FUMARATE DIHYDRATE SCH PUFF: 160; 4.5 AEROSOL RESPIRATORY (INHALATION) at 23:01

## 2019-10-26 NOTE — EKG
Test Reason : 

Blood Pressure : ***/*** mmHG

Vent. Rate : 097 BPM     Atrial Rate : 097 BPM

   P-R Int : 188 ms          QRS Dur : 084 ms

    QT Int : 352 ms       P-R-T Axes : 100 003 057 degrees

   QTc Int : 447 ms

 

*** POOR DATA QUALITY, INTERPRETATION MAY BE ADVERSELY AFFECTED

NORMAL SINUS RHYTHM

POSSIBLE INFERIOR INFARCT (CITED ON OR BEFORE 21-OCT-2018)

ABNORMAL ECG

WHEN COMPARED WITH ECG OF 04-OCT-2019 11:55,

QUESTIONABLE CHANGE IN INITIAL FORCES OF INFERIOR LEADS

Confirmed by JENNIFER CASON, CLAUDETTE ([id number]) on 10/26/2019 2:45:45 PM

 

Referred By:             Confirmed By:CLAUDETTE RODRIGUEZ MD

## 2019-10-26 NOTE — HP
CIWA Score


Nausea/Vomitin


Muscle Tremors: 3


Anxiety: 3


Agitation: 2


Paroxysmal Sweats: 2


Orientation: 0-Oriented


Tacttile Disturbances: 2-Mild Itch/Numbness/Burn


Auditory Disturbances: 2-Mild Harshness/Frighten


Visual Disturbances: 2-Mild Sensitivity


Headache: 2-Mild


CIWA-Ar Total Score: 20





- Admission Criteria


[facility] Guidelines: Admission for Medically Managed Detox: 


Requires at least one of the followin. CIWA greater than 12


2. Seizures within the past 24 hours


3. Delirium tremens within the past 24 hours


4. Hallucinations within the past 24 hours


5. Acute intervention needed for co  occurring medical disorder


6. Acute intervention needed for co  occurring psychiatric disorder


7. Severe withdrawal that cannot be handled at a lower level of care (continued


    vomiting, continued diarrhea, abnormal vital signs) requiring intravenous


    medication and/or fluids


8. Pregnancy








Admitting History and Physical





- Past Medical History


Cardiovascular: Yes: AFIB, CHF, HTN


Pulmonary: Yes: Asthma, Bronchitis





- Past Surgical History


Past Surgical History: Yes: Laminectomy (cervical)





- Smoking History


Smoking history: Never smoked


Have you smoked in the past 12 months: No


Aproximately how many cigarettes per day: 3





- Alcohol/Substance Use


Hx Alcohol Use: Yes





- Social History


ADL: Independent


Occupation:  [employer]


History of Recent Travel: No





Admission ROS S





- HPI


Chief Complaint: 





DEPENDENT ON ETOH ONLY


Allergies/Adverse Reactions: 


 Allergies











Allergy/AdvReac Type Severity Reaction Status Date / Time


 


No Known Allergies Allergy   Verified 10/26/19 17:26











History of Present Illness: 





THE PT. IS REQUESTING ADMISSION TO THE DETOX UNIT AND CAME FOR MEDICAL CLEARANCE


Exam Limitations: Physical Impairment





- Ebola screening


Have you traveled outside of the country in the last 21 days: No (N)


Have you had contact with anyone from an Ebola affected area: No


Have you been sick,other than usual withdrawal symptoms: No


Do you have a fever: No





- Review of Systems


Constitutional: See HPI, Malaise, Weakness


EENT: reports: See HPI


Respiratory: reports: See HPI, Shortness of Breath


Cardiac: reports: See HPI


GI: reports: See HPI, Diarrhea, Nausea, Abdominal cramping


: reports: No Symptoms Reported, See HPI


Musculoskeletal: reports: See HPI, Muscle Pain, Muscle Weakness, Neck Pain, 

Joint Stiffness


Integumentary: reports: See HPI, Sweating


Neuro: reports: See HPI, Headache, Tremors, Weakness


Endocrine: reports: See HPI


Hematology: reports: See HPI


Psychiatric: reports: Judgement Intact, Orientated x3, Anxious





Patient History





- Patient Medical History


Hx Anemia: No


Hx Asthma: Yes


Hx Chronic Obstructive Pulmonary Disease (COPD): No


Hx Cancer: No


Hx Cardiac Disorders: Yes (TAA.  1 EPISODE ASTHMA RELATED A-FIB 5 YRS AGO)


Hx Congestive Heart Failure: Yes


Hx Hypertension: Yes


Hx Hypercholesterolemia: No


Hx Pacemaker: No


HX Cerebrovascular Accident: No


Hx Seizures: No


Hx Dementia: No


Hx Diabetes: No


Hx Gastrointestinal Disorders: No


Hx Liver Disease: No


Hx Genitourinary Disorders: No


Hx Renal Disease (ESRD): No


Hx Thyroid Disease: No


Hx Human Immunodeficiency Virus (HIV): No


Hx Hepatitis C: No


Hx Depression: No


Other Medical History: H/O: FELL DOWN 3 WKS AGO AND FRACTURED C2 WITH HAIRLINE 

FRACTURE





- Patient Surgical History


Past Surgical History: Yes


Hx Neurologic Surgery: No


Hx Cataract Extraction: No


Hx Cardiac Surgery: No


Hx Lung Surgery: No


Hx Breast Surgery: No


Hx Breast Biopsy: No


Hx Abdominal Surgery: No


Hx Appendectomy: No


Hx Cholecystectomy: No


Hx Genitourinary Surgery: No


Hx  Section: No


Hx Orthopedic Surgery: Yes (SURGERY ON C6-C7 25 YRS. AGO)


Hx Hysterectomy: No


Anesthesia Reaction: No





- Smoking Cessation


Smoking history: Former smoker


Have you smoked in the past 12 months: No


If you are a former smoker, when did you quit?: 5 YRS. AGO


Hx Chewing Tobacco Use: No


Initiated information on smoking cessation: No


'Breaking Loose' booklet given: 10/26/19





- Substance & Tx. History


Hx Alcohol Use: Yes


Hx Substance Use: No


Substance Use Type: Alcohol


Hx Substance Use Treatment: Yes





- Substances abused


  ** Alcohol


Substance route: Oral


Frequency: Daily


Amount used: 3 of 6 packs of beer


Age of first use: 16


Date of last use: 10/26/19





Admission Physical Exam [facility]





- Vital Signs


Vital Signs: 


 Vital Signs - 24 hr











  10/26/19





  17:34


 


Temperature 98.0 F


 


Pulse Rate 98 H


 


Respiratory 16





Rate 


 


Blood Pressure 171/112 H














- Physical


General Appearance: Yes: No Apparent Distress, Nourished, Appropriately Dressed

, Tremorous, Sweating, Anxious


HEENTM: Yes: Hearing grossly Normal, Normocephalic, Normal Voice, BRIEN, Pharynx 

Normal


Respiratory: Yes: Chest Non-Tender, No Respiratory Distress, No Accessory 

Muscle Use, Rhonchi, Wheezing


Neck: Yes: No masses,lesions,Nodules, Trachea in good position


Breast: Yes: Breast Exam Deferred, Axillae without masses


Cardiology: Yes: Regular Rhythm, S1, S2, Tachycardia


Back: Yes: Normal Inspection, Decreased Range of Motion


Musculoskeletal: Yes: full range of Motion, Gait Steady, Pelvis Stable, Back 

pain, Muscle Pain, Muscle weakness


Extremities: Yes: Normal Capillary Refill, Normal Range of Motion, Non-Tender, 

Tremors


Neurological: Yes: CNs II-XII NML intact, Fully Oriented, Alert, Motor Strength 

5/5, Normal Response


Integumentary: Yes: Normal Color, Warm, Moist


Lymphatic: Yes: Within Normal Limits





- Diagnostic


(1) EtOH dependence


Current Visit: Yes   Status: Chronic   


Qualifiers: 


   Substance use status: other alcohol-induced disorder   Qualified Code(s): 

F10.288 - Alcohol dependence with other alcohol-induced disorder   





(2) Back pain


Current Visit: No   Status: Chronic   


Qualifiers: 


   Back pain location: low back pain   Chronicity: acute   Back pain laterality

: midline   Sciatica presence: without sciatica   Qualified Code(s): M54.5 - 

Low back pain   





(3) Bronchial asthma


Current Visit: No   Status: Chronic   


Qualifiers: 


   Asthma severity: unspecified severity   Asthma persistence: unspecified   

Asthma complication type: unspecified   Qualified Code(s): J45.909 - 

Unspecified asthma, uncomplicated   





(4) HTN (hypertension)


Current Visit: No   Status: Chronic   


Qualifiers: 


   Hypertension type: essential hypertension   Qualified Code(s): I10 - 

Essential (primary) hypertension   





(5) Heart failure


Current Visit: No   Status: Chronic   


Qualifiers: 


   Heart failure type: unspecified   Heart failure chronicity: unspecified   

Qualified Code(s): I50.9 - Heart failure, unspecified   





Cleared for Admission [facility]





- Detox or Rehab


S Level of Care: Medically Supervised


Detox Regimen/Protocol: Librium





Breathalyzer





- Breathalyzer


Breathalyzer: 0





Urine Drug Screen





- Test Device


Lot number: [id number]


Expiration date: 21





- Control


Is test valid?: Yes





- Results


Drug screen NEGATIVE: No


Urine drug screen results: BZO-Benzodiazepines





Inpatient Rehab Admission





- Rehab Decision to Admit


Inpatient rehab admission?: No

## 2019-10-26 NOTE — EKG
Test Reason : 

Blood Pressure : ***/*** mmHG

Vent. Rate : 093 BPM     Atrial Rate : 093 BPM

   P-R Int : 150 ms          QRS Dur : 088 ms

    QT Int : 372 ms       P-R-T Axes : 023 -06 054 degrees

   QTc Int : 462 ms

 

*** POOR DATA QUALITY, INTERPRETATION MAY BE ADVERSELY AFFECTED

NORMAL SINUS RHYTHM

POSSIBLE INFERIOR INFARCT (CITED ON OR BEFORE 21-OCT-2018)

ABNORMAL ECG

WHEN COMPARED WITH ECG OF 25-OCT-2019 13:11,

NO SIGNIFICANT CHANGE WAS FOUND

Confirmed by JENNIFER CASON, CLAUDETTE ([id number]) on 10/26/2019 2:52:30 PM

 

Referred By:             Confirmed By:CLAUDETTE RODRIGUEZ MD

## 2019-10-27 LAB
ALBUMIN SERPL-MCNC: 3.7 G/DL (ref 3.4–5)
ALP SERPL-CCNC: 58 U/L (ref 45–117)
ALT SERPL-CCNC: 31 U/L (ref 13–61)
ANION GAP SERPL CALC-SCNC: 7 MMOL/L (ref 8–16)
AST SERPL-CCNC: 16 U/L (ref 15–37)
BILIRUB SERPL-MCNC: 0.7 MG/DL (ref 0.2–1)
BUN SERPL-MCNC: 8.1 MG/DL (ref 7–18)
CALCIUM SERPL-MCNC: 9.2 MG/DL (ref 8.5–10.1)
CHLORIDE SERPL-SCNC: 94 MMOL/L (ref 98–107)
CO2 SERPL-SCNC: 27 MMOL/L (ref 21–32)
CREAT SERPL-MCNC: 0.8 MG/DL (ref 0.55–1.3)
DEPRECATED RDW RBC AUTO: 13.5 % (ref 11.9–15.9)
GLUCOSE SERPL-MCNC: 107 MG/DL (ref 74–106)
HCT VFR BLD CALC: 39.2 % (ref 35.4–49)
HGB BLD-MCNC: 13.3 GM/DL (ref 11.7–16.9)
MCH RBC QN AUTO: 31.1 PG (ref 25.7–33.7)
MCHC RBC AUTO-ENTMCNC: 34 G/DL (ref 32–35.9)
MCV RBC: 91.5 FL (ref 80–96)
PLATELET # BLD AUTO: 282 K/MM3 (ref 134–434)
PMV BLD: 8.3 FL (ref 7.5–11.1)
POTASSIUM SERPLBLD-SCNC: 3.8 MMOL/L (ref 3.5–5.1)
PROT SERPL-MCNC: 6.5 G/DL (ref 6.4–8.2)
RBC # BLD AUTO: 4.28 M/MM3 (ref 4–5.6)
SODIUM SERPL-SCNC: 128 MMOL/L (ref 136–145)
WBC # BLD AUTO: 6.6 K/MM3 (ref 4–10)

## 2019-10-27 RX ADMIN — HYDROXYZINE PAMOATE PRN MG: 25 CAPSULE ORAL at 22:41

## 2019-10-27 RX ADMIN — VALSARTAN SCH MG: 40 TABLET, FILM COATED ORAL at 22:37

## 2019-10-27 RX ADMIN — IBUPROFEN PRN MG: 400 TABLET, FILM COATED ORAL at 18:46

## 2019-10-27 RX ADMIN — IPRATROPIUM BROMIDE SCH AMP: 0.5 SOLUTION RESPIRATORY (INHALATION) at 13:39

## 2019-10-27 RX ADMIN — ACETAMINOPHEN PRN MG: 325 TABLET ORAL at 22:38

## 2019-10-27 RX ADMIN — ALBUTEROL SULFATE PRN PUFF: 90 AEROSOL, METERED RESPIRATORY (INHALATION) at 04:08

## 2019-10-27 RX ADMIN — Medication SCH MG: at 22:38

## 2019-10-27 RX ADMIN — DOCUSATE SODIUM SCH MG: 100 CAPSULE, LIQUID FILLED ORAL at 22:37

## 2019-10-27 RX ADMIN — ASPIRIN SCH MG: 81 TABLET, COATED ORAL at 10:36

## 2019-10-27 RX ADMIN — IPRATROPIUM BROMIDE SCH AMP: 0.5 SOLUTION RESPIRATORY (INHALATION) at 21:15

## 2019-10-27 RX ADMIN — ALBUTEROL SULFATE PRN PUFF: 90 AEROSOL, METERED RESPIRATORY (INHALATION) at 09:13

## 2019-10-27 RX ADMIN — CARVEDILOL SCH MG: 12.5 TABLET, FILM COATED ORAL at 10:36

## 2019-10-27 RX ADMIN — Medication PRN MG: at 22:39

## 2019-10-27 RX ADMIN — Medication SCH TAB: at 10:36

## 2019-10-27 RX ADMIN — FAMOTIDINE SCH MG: 20 TABLET ORAL at 10:36

## 2019-10-27 RX ADMIN — CARVEDILOL SCH MG: 12.5 TABLET, FILM COATED ORAL at 22:15

## 2019-10-27 RX ADMIN — BUDESONIDE AND FORMOTEROL FUMARATE DIHYDRATE SCH PUFF: 160; 4.5 AEROSOL RESPIRATORY (INHALATION) at 10:37

## 2019-10-27 RX ADMIN — VALSARTAN SCH MG: 40 TABLET, FILM COATED ORAL at 10:36

## 2019-10-27 RX ADMIN — BUDESONIDE AND FORMOTEROL FUMARATE DIHYDRATE SCH PUFF: 160; 4.5 AEROSOL RESPIRATORY (INHALATION) at 22:38

## 2019-10-27 RX ADMIN — IBUPROFEN PRN MG: 400 TABLET, FILM COATED ORAL at 10:39

## 2019-10-27 RX ADMIN — ACETAMINOPHEN PRN MG: 325 TABLET ORAL at 04:05

## 2019-10-27 RX ADMIN — IPRATROPIUM BROMIDE SCH AMP: 0.5 SOLUTION RESPIRATORY (INHALATION) at 10:35

## 2019-10-27 NOTE — PN
S CIWA





- CIWA Score


Nausea/Vomitin-Mild Nausea/No Vomiting


Muscle Tremors: 4-Moderate,w/Arms Extend


Anxiety: 4-Mod. Anxious/Guarded


Agitation: 4-Moderately Restless


Paroxysmal Sweats: 3


Orientation: 0-Oriented


Tacttile Disturbances: 0-None


Auditory Disturbances: 0-None


Visual Disturbances: 0-None


Headache: 0-None Present


CIWA-Ar Total Score: 16





[facility] Progress Note (SOAP)


Subjective: 





Nausea, interrupted sleep, anxious. Patient wearing neck brace stating he was 

drunk and fell and was told he has a small non-displaced hairline fx and was 

told to wear the brace for 4-6 weeks


Objective: 





10/27/19 15:47


 Last Vital Signs











Temp Pulse Resp BP Pulse Ox


 


 98.2 F   81   18   133/86    


 


 10/27/19 14:00  10/27/19 14:00  10/27/19 14:00  10/27/19 14:00   





Elevated b/p noted: has htn (on med)


 Laboratory Tests











  10/27/19 10/27/19 10/27/19





  07:40 07:40 07:40


 


WBC  6.6  


 


RBC  4.28  


 


Hgb  13.3  


 


Hct  39.2  


 


MCV  91.5  


 


MCH  31.1  


 


MCHC  34.0  


 


RDW  13.5  


 


Plt Count  282  D  


 


MPV  8.3  


 


Sodium   128 L 


 


Potassium   3.8 


 


Chloride   94 L 


 


Carbon Dioxide   27 


 


Anion Gap   7 L 


 


BUN   8.1 


 


Creatinine   0.8 


 


Est GFR (CKD-EPI)AfAm   115.74 


 


Est GFR (CKD-EPI)NonAf   99.86 


 


Random Glucose   107 H 


 


Calcium   9.2 


 


Total Bilirubin   0.7 


 


AST   16 


 


ALT   31 


 


Alkaline Phosphatase   58 


 


Total Protein   6.5 


 


Albumin   3.7 


 


RPR Titer    Nonreactive








Labs reviewed: Na 128 (low)


Assessment: 





10/27/19 15:49


Withdrawal sxs





Noted with hyponatremia


Plan: 





Continue detox





HTN: continue antihypertensive medication, continue to monitor b/p





Hyponatremia: repeat serum sodium level

## 2019-10-28 RX ADMIN — CARVEDILOL SCH MG: 12.5 TABLET, FILM COATED ORAL at 10:44

## 2019-10-28 RX ADMIN — IPRATROPIUM BROMIDE SCH AMP: 0.5 SOLUTION RESPIRATORY (INHALATION) at 07:04

## 2019-10-28 RX ADMIN — CARVEDILOL SCH MG: 12.5 TABLET, FILM COATED ORAL at 22:09

## 2019-10-28 RX ADMIN — DOCUSATE SODIUM SCH MG: 100 CAPSULE, LIQUID FILLED ORAL at 22:08

## 2019-10-28 RX ADMIN — ACETAMINOPHEN PRN MG: 325 TABLET ORAL at 05:46

## 2019-10-28 RX ADMIN — VALSARTAN SCH MG: 40 TABLET, FILM COATED ORAL at 10:44

## 2019-10-28 RX ADMIN — ALUMINUM HYDROXIDE, MAGNESIUM HYDROXIDE, AND SIMETHICONE PRN ML: 200; 200; 20 SUSPENSION ORAL at 05:44

## 2019-10-28 RX ADMIN — BUDESONIDE AND FORMOTEROL FUMARATE DIHYDRATE SCH PUFF: 160; 4.5 AEROSOL RESPIRATORY (INHALATION) at 22:13

## 2019-10-28 RX ADMIN — HYDROXYZINE PAMOATE PRN MG: 25 CAPSULE ORAL at 22:10

## 2019-10-28 RX ADMIN — Medication SCH MG: at 22:09

## 2019-10-28 RX ADMIN — BUDESONIDE AND FORMOTEROL FUMARATE DIHYDRATE SCH PUFF: 160; 4.5 AEROSOL RESPIRATORY (INHALATION) at 10:44

## 2019-10-28 RX ADMIN — ASPIRIN SCH MG: 81 TABLET, COATED ORAL at 10:44

## 2019-10-28 RX ADMIN — IPRATROPIUM BROMIDE SCH: 0.5 SOLUTION RESPIRATORY (INHALATION) at 17:31

## 2019-10-28 RX ADMIN — FAMOTIDINE SCH MG: 20 TABLET ORAL at 10:44

## 2019-10-28 RX ADMIN — IPRATROPIUM BROMIDE SCH: 0.5 SOLUTION RESPIRATORY (INHALATION) at 12:58

## 2019-10-28 RX ADMIN — METHOCARBAMOL PRN MG: 500 TABLET ORAL at 15:27

## 2019-10-28 RX ADMIN — IBUPROFEN PRN MG: 400 TABLET, FILM COATED ORAL at 14:11

## 2019-10-28 RX ADMIN — ACETAMINOPHEN PRN MG: 325 TABLET ORAL at 22:11

## 2019-10-28 RX ADMIN — Medication PRN MG: at 22:09

## 2019-10-28 RX ADMIN — Medication SCH TAB: at 10:44

## 2019-10-28 RX ADMIN — IPRATROPIUM BROMIDE SCH AMP: 0.5 SOLUTION RESPIRATORY (INHALATION) at 22:09

## 2019-10-28 RX ADMIN — VALSARTAN SCH MG: 40 TABLET, FILM COATED ORAL at 22:25

## 2019-10-28 RX ADMIN — IPRATROPIUM BROMIDE SCH AMP: 0.5 SOLUTION RESPIRATORY (INHALATION) at 10:43

## 2019-10-28 NOTE — PN
S CIWA





- CIWA Score


Nausea/Vomitin-Mild Nausea/No Vomiting


Muscle Tremors: 1-None Visible, but Felt


Anxiety: 2


Agitation: 2


Paroxysmal Sweats: No Perspiration


Orientation: 0-Oriented


Tacttile Disturbances: 1-Very Mild Itch/Numbness


Auditory Disturbances: 0-None


Visual Disturbances: 0-None


Headache: 2-Mild


CIWA-Ar Total Score: 9





[facility] Progress Note (SOAP)


Subjective: 





alert,irritable,anxious,interrupted sleep,wearing neck brace


Objective: 





10/28/19 16:27


 Vital Signs











Temperature  97.7 F   10/28/19 13:25


 


Pulse Rate  93 H  10/28/19 13:25


 


Respiratory Rate  20   10/28/19 13:25


 


Blood Pressure  124/71   10/28/19 13:25


 


O2 Sat by Pulse Oximetry (%)      








 Laboratory Last Values











WBC  6.6 K/mm3 (4.0-10.0)   10/27/19  07:40    


 


RBC  4.28 M/mm3 (4.00-5.60)   10/27/19  07:40    


 


Hgb  13.3 GM/dL (11.7-16.9)   10/27/19  07:40    


 


Hct  39.2 % (35.4-49)   10/27/19  07:40    


 


MCV  91.5 fl (80-96)   10/27/19  07:40    


 


MCH  31.1 pg (25.7-33.7)   10/27/19  07:40    


 


MCHC  34.0 g/dl (32.0-35.9)   10/27/19  07:40    


 


RDW  13.5 % (11.9-15.9)   10/27/19  07:40    


 


Plt Count  282 K/MM3 (134-434)  D 10/27/19  07:40    


 


MPV  8.3 fl (7.5-11.1)   10/27/19  07:40    


 


Sodium  134 mmol/L (136-145)  L  10/28/19  08:24    


 


Potassium  3.8 mmol/L (3.5-5.1)   10/27/19  07:40    


 


Chloride  94 mmol/L ()  L  10/27/19  07:40    


 


Carbon Dioxide  27 mmol/L (21-32)   10/27/19  07:40    


 


Anion Gap  7 MMOL/L (8-16)  L  10/27/19  07:40    


 


BUN  8.1 mg/dL (7-18)   10/27/19  07:40    


 


Creatinine  0.8 mg/dL (0.55-1.3)   10/27/19  07:40    


 


Est GFR (CKD-EPI)AfAm  115.74   10/27/19  07:40    


 


Est GFR (CKD-EPI)NonAf  99.86   10/27/19  07:40    


 


Random Glucose  107 mg/dL ()  H  10/27/19  07:40    


 


Calcium  9.2 mg/dL (8.5-10.1)   10/27/19  07:40    


 


Total Bilirubin  0.7 mg/dL (0.2-1)   10/27/19  07:40    


 


AST  16 U/L (15-37)   10/27/19  07:40    


 


ALT  31 U/L (13-61)   10/27/19  07:40    


 


Alkaline Phosphatase  58 U/L ()   10/27/19  07:40    


 


Total Protein  6.5 g/dl (6.4-8.2)   10/27/19  07:40    


 


Albumin  3.7 g/dl (3.4-5.0)   10/27/19  07:40    


 


RPR Titer  Nonreactive  (NONREACTIVE)   10/27/19  07:40    











10/28/19 16:28


repeat na is 134


Assessment: 





10/28/19 16:28


withdrawal symptom


Plan: 





continue detox librium regimen,wearing neck brace

## 2019-10-29 RX ADMIN — IPRATROPIUM BROMIDE SCH: 0.5 SOLUTION RESPIRATORY (INHALATION) at 16:45

## 2019-10-29 RX ADMIN — CARVEDILOL SCH MG: 12.5 TABLET, FILM COATED ORAL at 10:45

## 2019-10-29 RX ADMIN — Medication SCH MG: at 22:11

## 2019-10-29 RX ADMIN — IPRATROPIUM BROMIDE SCH: 0.5 SOLUTION RESPIRATORY (INHALATION) at 10:45

## 2019-10-29 RX ADMIN — HYDROXYZINE PAMOATE PRN MG: 25 CAPSULE ORAL at 12:04

## 2019-10-29 RX ADMIN — IBUPROFEN PRN MG: 400 TABLET, FILM COATED ORAL at 15:37

## 2019-10-29 RX ADMIN — IPRATROPIUM BROMIDE SCH: 0.5 SOLUTION RESPIRATORY (INHALATION) at 20:05

## 2019-10-29 RX ADMIN — Medication SCH TAB: at 10:45

## 2019-10-29 RX ADMIN — BUDESONIDE AND FORMOTEROL FUMARATE DIHYDRATE SCH PUFF: 160; 4.5 AEROSOL RESPIRATORY (INHALATION) at 23:03

## 2019-10-29 RX ADMIN — IPRATROPIUM BROMIDE SCH: 0.5 SOLUTION RESPIRATORY (INHALATION) at 12:03

## 2019-10-29 RX ADMIN — VALSARTAN SCH MG: 40 TABLET, FILM COATED ORAL at 22:10

## 2019-10-29 RX ADMIN — HYDROXYZINE PAMOATE PRN MG: 25 CAPSULE ORAL at 22:13

## 2019-10-29 RX ADMIN — BUDESONIDE AND FORMOTEROL FUMARATE DIHYDRATE SCH PUFF: 160; 4.5 AEROSOL RESPIRATORY (INHALATION) at 10:45

## 2019-10-29 RX ADMIN — DOCUSATE SODIUM SCH MG: 100 CAPSULE, LIQUID FILLED ORAL at 22:10

## 2019-10-29 RX ADMIN — METHOCARBAMOL PRN MG: 500 TABLET ORAL at 22:13

## 2019-10-29 RX ADMIN — VALSARTAN SCH MG: 40 TABLET, FILM COATED ORAL at 10:45

## 2019-10-29 RX ADMIN — IBUPROFEN PRN MG: 400 TABLET, FILM COATED ORAL at 05:47

## 2019-10-29 RX ADMIN — ASPIRIN SCH MG: 81 TABLET, COATED ORAL at 10:45

## 2019-10-29 RX ADMIN — CARVEDILOL SCH MG: 12.5 TABLET, FILM COATED ORAL at 22:10

## 2019-10-29 RX ADMIN — METHOCARBAMOL PRN MG: 500 TABLET ORAL at 03:00

## 2019-10-29 RX ADMIN — Medication PRN MG: at 22:12

## 2019-10-29 RX ADMIN — ALBUTEROL SULFATE PRN PUFF: 90 AEROSOL, METERED RESPIRATORY (INHALATION) at 05:47

## 2019-10-29 RX ADMIN — FAMOTIDINE SCH MG: 20 TABLET ORAL at 10:46

## 2019-10-29 RX ADMIN — METHOCARBAMOL PRN MG: 500 TABLET ORAL at 15:38

## 2019-10-29 NOTE — PN
S CIWA





- CIWA Score


Nausea/Vomitin-No Nausea/No Vomiting


Muscle Tremors: 1-None Visible, but Felt


Anxiety: 1-Mildly Anxious


Agitation: 2


Paroxysmal Sweats: No Perspiration


Orientation: 0-Oriented


Tacttile Disturbances: 0-None


Auditory Disturbances: 0-None


Visual Disturbances: 0-None


Headache: 0-None Present


CIWA-Ar Total Score: 4





[facility] Progress Note (SOAP)


Subjective: 





back pain


sweats





Objective: 





10/29/19 11:33


 Vital Signs











Temperature  98.2 F   10/29/19 09:47


 


Pulse Rate  90   10/29/19 09:47


 


Respiratory Rate  18   10/29/19 09:47


 


Blood Pressure  147/88   10/29/19 09:47


 


O2 Sat by Pulse Oximetry (%)      








[id number]


ambulating


no acute distress


Assessment: 





10/29/19 11:34


mild withdrawals


Plan: 





continue detox


lidocaine patch


motrin 800mg tid prn


d/c in am

## 2019-10-30 VITALS — TEMPERATURE: 96.8 F | DIASTOLIC BLOOD PRESSURE: 99 MMHG | SYSTOLIC BLOOD PRESSURE: 136 MMHG | HEART RATE: 83 BPM

## 2019-10-30 RX ADMIN — Medication SCH TAB: at 09:47

## 2019-10-30 RX ADMIN — METHOCARBAMOL PRN MG: 500 TABLET ORAL at 05:29

## 2019-10-30 RX ADMIN — ASPIRIN SCH MG: 81 TABLET, COATED ORAL at 09:47

## 2019-10-30 RX ADMIN — ALBUTEROL SULFATE PRN PUFF: 90 AEROSOL, METERED RESPIRATORY (INHALATION) at 05:30

## 2019-10-30 RX ADMIN — IBUPROFEN PRN MG: 400 TABLET, FILM COATED ORAL at 05:29

## 2019-10-30 RX ADMIN — CARVEDILOL SCH MG: 12.5 TABLET, FILM COATED ORAL at 09:46

## 2019-10-30 RX ADMIN — FAMOTIDINE SCH MG: 20 TABLET ORAL at 09:47

## 2019-10-30 NOTE — DS
[facility] Detox Discharge Summary


Admission Date: 


10/26/19





Discharge Date: 10/30/19





- History


Present History: Alcohol Dependence





- Physical Exam Results


Vital Signs: 


 Vital Signs











Temperature  97.2 F L  10/30/19 05:52


 


Pulse Rate  77   10/30/19 05:52


 


Respiratory Rate  18   10/30/19 05:52


 


Blood Pressure  143/82   10/30/19 05:52


 


O2 Sat by Pulse Oximetry (%)      











Pertinent Admission Physical Exam Findings: 





pt arrived in withdrawals


 Vital Signs











Temperature  97.2 F L  10/30/19 05:52


 


Pulse Rate  77   10/30/19 05:52


 


Respiratory Rate  18   10/30/19 05:52


 


Blood Pressure  143/82   10/30/19 05:52


 


O2 Sat by Pulse Oximetry (%)      








 Laboratory Tests











  10/27/19 10/27/19 10/27/19





  07:40 07:40 07:40


 


WBC  6.6  


 


RBC  4.28  


 


Hgb  13.3  


 


Hct  39.2  


 


MCV  91.5  


 


MCH  31.1  


 


MCHC  34.0  


 


RDW  13.5  


 


Plt Count  282  D  


 


MPV  8.3  


 


Sodium   128 L 


 


Potassium   3.8 


 


Chloride   94 L 


 


Carbon Dioxide   27 


 


Anion Gap   7 L 


 


BUN   8.1 


 


Creatinine   0.8 


 


Est GFR (CKD-EPI)AfAm   115.74 


 


Est GFR (CKD-EPI)NonAf   99.86 


 


Random Glucose   107 H 


 


Calcium   9.2 


 


Total Bilirubin   0.7 


 


AST   16 


 


ALT   31 


 


Alkaline Phosphatase   58 


 


Total Protein   6.5 


 


Albumin   3.7 


 


RPR Titer    Nonreactive














  10/28/19





  08:24


 


WBC 


 


RBC 


 


Hgb 


 


Hct 


 


MCV 


 


MCH 


 


MCHC 


 


RDW 


 


Plt Count 


 


MPV 


 


Sodium  134 L


 


Potassium 


 


Chloride 


 


Carbon Dioxide 


 


Anion Gap 


 


BUN 


 


Creatinine 


 


Est GFR (CKD-EPI)AfAm 


 


Est GFR (CKD-EPI)NonAf 


 


Random Glucose 


 


Calcium 


 


Total Bilirubin 


 


AST 


 


ALT 


 


Alkaline Phosphatase 


 


Total Protein 


 


Albumin 


 


RPR Titer 








pt aaox3


ambulating


no acute distress


no s/s of withdrawals





- Treatment


Hospital Course: Detox Protocol Followed, Detoxed Safely, Responded well, 

Discharged Condition Good, Rehab Referral Accepted


Patient has Accepted a Rehab Referral to: pt referred to [facility]





- Medication


Discharge Medications: 


Ambulatory Orders





Albuterol Sulfate Inhaler - [Ventolin HFA Inhaler -] 2 puff IH Q6H PRN  inhaler 

10/14/19 


Aspirin Coated [Ecotrin -] 81 mg PO DAILY  tablet.ec 10/14/19 


Carvedilol [Coreg -] 12.5 mg PO BID #60 tablet 10/14/19 


Docusate Sodium [Colace -] 200 mg PO HS #20 capsule 10/14/19 


Ipratropium 0.02% Nebulizer [Atrovent 0.02% Nebulizer -] 1 amp NEB RQID #25 amp 

10/14/19 


Multivitamins [Multivit ([facility] Formulary)] 1 tab PO DAILY  tab 10/14/19 


Thiamine HCl [Vitamin B1 -] 100 mg PO DAILY  tablet 10/14/19 


Valsartan [Diovan] 40 mg PO BID #60 tablet 10/14/19 


Budesonide/Formeterol Fumarate [SYMBICORT 160/4.5mcg -] 1 puff IH DAILY  

inhaler 10/26/19 


Loratadine [Claritin] 10 mg PO DAILY 10/26/19 


Zantac 150 mg PO DAILY 10/26/19 











- Diagnosis


(1) EtOH dependence


Current Visit: Yes   Status: Chronic   


Qualifiers: 


   Substance use status: uncomplicated   Qualified Code(s): F10.20 - Alcohol 

dependence, uncomplicated   





(2) A-fib


Current Visit: No   Status: Chronic   


Qualifiers: 


   Atrial fibrillation type: unspecified   Qualified Code(s): I48.91 - 

Unspecified atrial fibrillation   





(3) Asthma


Current Visit: Yes   Status: Chronic   


Qualifiers: 


   Asthma severity: mild   Asthma persistence: intermittent 





(4) Atrial fibrillation


Current Visit: No   Status: Chronic   


Qualifiers: 


   Atrial fibrillation type: paroxysmal   Qualified Code(s): I48.0 - Paroxysmal 

atrial fibrillation   





(5) Atrial flutter


Current Visit: No   Status: Chronic   


Qualifiers: 


   Atrial flutter type: typical   Qualified Code(s): I48.3 - Typical atrial 

flutter   





(6) Back pain


Current Visit: Yes   Status: Chronic   


Qualifiers: 


   Back pain location: low back pain   Chronicity: acute   Back pain laterality

: midline   Sciatica presence: without sciatica   Qualified Code(s): M54.5 - 

Low back pain   





(7) Bronchial asthma


Current Visit: No   Status: Chronic   


Qualifiers: 


   Asthma severity: unspecified severity   Asthma persistence: unspecified   

Asthma complication type: unspecified   Qualified Code(s): J45.909 - 

Unspecified asthma, uncomplicated   





(8) HTN (hypertension)


Current Visit: Yes   Status: Chronic   


Qualifiers: 


   Hypertension type: essential hypertension   Qualified Code(s): I10 - 

Essential (primary) hypertension   





(9) Heart failure


Current Visit: No   Status: Chronic   


Qualifiers: 


   Heart failure type: unspecified   Heart failure chronicity: unspecified   

Qualified Code(s): I50.9 - Heart failure, unspecified   





(10) Patent foramen ovale


Current Visit: No   Status: Chronic   





(11) Systolic dysfunction without heart failure


Current Visit: No   Status: Chronic   





(12) Pancreatitis, alcoholic, acute


Current Visit: No   Status: Resolved   


Qualifiers: 


   Acute pancreatitis complication: no infection or necrosis   Qualified Code(s)

: K85.20 - Alcohol induced acute pancreatitis without necrosis or infection   





- AMA


Did Patient Leave Against Medical Advice: No

## 2019-11-07 ENCOUNTER — APPOINTMENT (OUTPATIENT)
Dept: NEUROSURGERY | Facility: CLINIC | Age: 54
End: 2019-11-07

## 2019-11-18 ENCOUNTER — APPOINTMENT (OUTPATIENT)
Dept: NEUROSURGERY | Facility: CLINIC | Age: 54
End: 2019-11-18
Payer: MEDICAID

## 2019-11-18 VITALS
BODY MASS INDEX: 28.98 KG/M2 | HEIGHT: 76 IN | TEMPERATURE: 98 F | HEART RATE: 77 BPM | DIASTOLIC BLOOD PRESSURE: 98 MMHG | SYSTOLIC BLOOD PRESSURE: 142 MMHG | WEIGHT: 238 LBS

## 2019-11-18 DIAGNOSIS — J45.909 UNSPECIFIED ASTHMA, UNCOMPLICATED: ICD-10-CM

## 2019-11-18 DIAGNOSIS — M54.2 CERVICALGIA: ICD-10-CM

## 2019-11-18 PROCEDURE — 99205 OFFICE O/P NEW HI 60 MIN: CPT

## 2019-11-18 NOTE — HISTORY OF PRESENT ILLNESS
[de-identified] : 54 year-old male presents with above.  States that approximately 6 weeks ago, while intoxicated, patient fell while at home and struck the back of his head.  Does not recall specifics regarding the fall, only that he called his brother, who took him to the emergency room at [facility] in [place].  Denies LOC or external head trauma at time of fall.  Imaging performed while in ER there revealed non-displaced linear fracture of C2.  Placed in rigid cervical collar and instructed to follow up with spine surgery and was referred to us at [place].\par \par Currently, patient denies neck pain.  Denies weakness, numbness/tingling of extremities.  Has been compliant with cervical collar since incident, removing it only to shower. [FreeTextEntry1] : C2 fracture

## 2019-11-18 NOTE — PHYSICAL EXAM
[General Appearance - Alert] : alert [General Appearance - Well Nourished] : well nourished [General Appearance - In No Acute Distress] : in no acute distress [I: Normal Smell] : smell intact bilaterally [Oriented To Time, Place, And Person] : oriented to person, place, and time [Cranial Nerves Optic (II)] : visual acuity intact bilaterally,  pupils equal round and reactive to light [Cranial Nerves Oculomotor (III)] : extraocular motion intact [Cranial Nerves Trigeminal (V)] : facial sensation intact symmetrically [Cranial Nerves Facial (VII)] : face symmetrical [Cranial Nerves Vestibulocochlear (VIII)] : hearing was intact bilaterally [Cranial Nerves Glossopharyngeal (IX)] : tongue and palate midline [Cranial Nerves Accessory (XI - Cranial And Spinal)] : head turning and shoulder shrug symmetric [Cranial Nerves Hypoglossal (XII)] : there was no tongue deviation with protrusion [Motor Tone] : muscle tone was normal in all four extremities [Motor Strength] : muscle strength was normal in all four extremities [Sensation Tactile Decrease] : light touch was intact [Abnormal Walk] : normal gait [Balance] : balance was intact

## 2019-11-18 NOTE — END OF VISIT
[FreeTextEntry3] : I have seen the patient and reviewed the case together with PA and I agree with the final recommendations and plan of care.\par \par Robbie Serrano MD\par Neurosurgery\par \par  [>50% of Time Spent on Counseling and Coordination of Care for  ___] : Greater than 50% of the encounter time was spent on counseling and coordination of care for [unfilled] [Time Spent: ___ minutes] : I have spent [unfilled] minutes of face to face time with the patient

## 2019-11-18 NOTE — DATA REVIEWED
[de-identified] : CT scan thoracic and lumbar spine: No fractures [de-identified] : cervical reviewed: no evidence of ligamentous injury. Possible old nondisplaced type II odontoid fracture.

## 2019-11-18 NOTE — ASSESSMENT
[FreeTextEntry1] : I have discussed the natural history and treatment options for different types of C2 odontoid fracture with the patient. I explained the indications for observation, conservative management, medical management, physical therapy, pain management approaches and surgery. Patient has been using collar for the past 6 weeks and is asymptomatic with no pain at all.  In the end my recommendation is for repeating CT scan of the cervical spine and F/E xrays. Patient understood and agreed with our plan of care and decided to move forward with it. All questions were answered. Follow up in 2 weeks.\par

## 2019-12-04 ENCOUNTER — APPOINTMENT (OUTPATIENT)
Dept: NEUROSURGERY | Facility: CLINIC | Age: 54
End: 2019-12-04
Payer: MEDICAID

## 2019-12-04 VITALS
TEMPERATURE: 98 F | BODY MASS INDEX: 28.98 KG/M2 | HEART RATE: 81 BPM | SYSTOLIC BLOOD PRESSURE: 128 MMHG | HEIGHT: 76 IN | DIASTOLIC BLOOD PRESSURE: 86 MMHG | WEIGHT: 238 LBS

## 2019-12-04 PROCEDURE — 99214 OFFICE O/P EST MOD 30 MIN: CPT

## 2019-12-04 RX ORDER — APIXABAN 5 MG/1
5 TABLET, FILM COATED ORAL
Qty: 90 | Refills: 1 | Status: DISCONTINUED | COMMUNITY
End: 2019-12-04

## 2019-12-04 NOTE — DATA REVIEWED
[de-identified] : Cervical sine CT scan:\par No report available. Informed patient to fax the report. No acute fracture or dislocation throughout the cervical spine. Possible old linear fracture in C2.\par \par \par  [de-identified] : Cervical spine Xrays F/E: \par No report available. Informed patient to fax the report. No evidence of instability. No acute fracture or dislocation throughout the cervical spine.

## 2019-12-04 NOTE — HISTORY OF PRESENT ILLNESS
[FreeTextEntry1] : Mr. KATEY FLORES is a 54 year year-old patient  who comes for follow up for possible C2 fracture after using cervical collar for 8 weeks with new CT scan of the cervical spine and F/E xrays to review. Denies neck pain.\par Patient denies motor or sensation deficits, balance or gait problems, urinary or bowel incontinence.\par

## 2019-12-04 NOTE — PHYSICAL EXAM
[Person] : oriented to person [Place] : oriented to place [Time] : oriented to time [Short Term Intact] : short term memory intact [Remote Intact] : remote memory intact [Span Intact] : the attention span was normal [Concentration Intact] : normal concentrating ability [Fluency] : fluency intact [Current Events] : adequate knowledge of current events [Comprehension] : comprehension intact [Past History] : adequate knowledge of personal past history [Cranial Nerves Oculomotor (III)] : extraocular motion intact [Cranial Nerves Optic (II)] : visual acuity intact bilaterally,  pupils equal round and reactive to light [Vocabulary] : adequate range of vocabulary [Cranial Nerves Vestibulocochlear (VIII)] : hearing was intact bilaterally [Cranial Nerves Trigeminal (V)] : facial sensation intact symmetrically [Cranial Nerves Facial (VII)] : face symmetrical [Cranial Nerves Hypoglossal (XII)] : there was no tongue deviation with protrusion [Cranial Nerves Accessory (XI - Cranial And Spinal)] : head turning and shoulder shrug symmetric [Motor Tone] : muscle tone was normal in all four extremities [Cranial Nerves Glossopharyngeal (IX)] : tongue and palate midline [Motor Strength] : muscle strength was normal in all four extremities [Sensation Tactile Decrease] : light touch was intact [No Muscle Atrophy] : normal bulk in all four extremities [Balance] : balance was intact [Abnormal Walk] : normal gait [Past-pointing] : there was no past-pointing [Tremor] : no tremor present [2+] : Patella left 2+ [No Visual Abnormalities] : no visible abnormalities [Normal Lordosis] : normal lordosis [No Scoliosis] : no scoliosis [No Tenderness to Palpation] : no spine tenderness on palpation [No Masses] : no masses [Full ROM] : full ROM [L'Hermitte's] : neck flexion did not produce tingling down the spine/arms [No Pain with ROM] : no pain with motion in any direction [Spurling's - Opposite Side] : Negative Spurling's on opposite side [Spurling's Same Side] : Negative Spurling's on same side

## 2019-12-04 NOTE — ASSESSMENT
[FreeTextEntry1] : I have discussed the natural history and treatment options for C2 fracture with the patient. I explained the indications for observation, conservative management, medical management, physical therapy, pain management approaches and surgery. I reassured the patient of the good results of his cervical spine CT scan and x-rays. In the end my recommendation is for removing the cervical collar at this point. Patient understood and agreed with our plan of care and decided to move forward with it. All questions were answered. Follow up in 6 months.

## 2020-02-05 ENCOUNTER — APPOINTMENT (OUTPATIENT)
Dept: NEUROSURGERY | Facility: CLINIC | Age: 55
End: 2020-02-05
Payer: MEDICAID

## 2020-02-05 VITALS
HEIGHT: 76 IN | TEMPERATURE: 98 F | BODY MASS INDEX: 28.98 KG/M2 | DIASTOLIC BLOOD PRESSURE: 94 MMHG | SYSTOLIC BLOOD PRESSURE: 150 MMHG | WEIGHT: 238 LBS | HEART RATE: 66 BPM

## 2020-02-05 DIAGNOSIS — M25.519 PAIN IN UNSPECIFIED SHOULDER: ICD-10-CM

## 2020-02-05 DIAGNOSIS — S12.100A UNSPECIFIED DISPLACED FRACTURE OF SECOND CERVICAL VERTEBRA, INITIAL ENCOUNTER FOR CLOSED FRACTURE: ICD-10-CM

## 2020-02-05 PROCEDURE — 99214 OFFICE O/P EST MOD 30 MIN: CPT

## 2020-02-05 RX ORDER — APIXABAN 5 MG/1
5 TABLET, FILM COATED ORAL
Refills: 0 | Status: ACTIVE | COMMUNITY

## 2020-02-05 NOTE — PHYSICAL EXAM
[Person] : oriented to person [Place] : oriented to place [Time] : oriented to time [Fluency] : fluency intact [Motor Tone] : muscle tone was normal in all four extremities [Motor Strength] : muscle strength was normal in all four extremities [Sensation Tactile Decrease] : light touch was intact [2+] : Ankle jerk right 2+ [No Visual Abnormalities] : no visible abnormalities [No Tenderness to Palpation] : no spine tenderness on palpation [No Pain with ROM] : no pain with motion in any direction [FreeTextEntry5] : PERRL, EOMs intact, speech clear, face symmetrical, tongue midline [FreeTextEntry1] : Left shoulder pain on palpation to posterior region, pain and limited rotation on abduction, adduction, internal and external rotation

## 2020-02-21 ENCOUNTER — APPOINTMENT (OUTPATIENT)
Dept: ORTHOPEDIC SURGERY | Facility: CLINIC | Age: 55
End: 2020-02-21

## 2020-02-25 ENCOUNTER — APPOINTMENT (OUTPATIENT)
Dept: ORTHOPEDIC SURGERY | Facility: CLINIC | Age: 55
End: 2020-02-25

## 2020-03-02 ENCOUNTER — APPOINTMENT (OUTPATIENT)
Dept: HEART AND VASCULAR | Facility: CLINIC | Age: 55
End: 2020-03-02
Payer: MEDICAID

## 2020-03-02 PROCEDURE — 93228 REMOTE 30 DAY ECG REV/REPORT: CPT

## 2021-10-06 PROBLEM — I10 ESSENTIAL HYPERTENSION: Status: ACTIVE | Noted: 2018-10-30
